# Patient Record
Sex: FEMALE | Race: WHITE | Employment: OTHER | URBAN - METROPOLITAN AREA
[De-identification: names, ages, dates, MRNs, and addresses within clinical notes are randomized per-mention and may not be internally consistent; named-entity substitution may affect disease eponyms.]

---

## 2017-01-17 ENCOUNTER — OFFICE VISIT (OUTPATIENT)
Dept: INTERNAL MEDICINE CLINIC | Age: 82
End: 2017-01-17

## 2017-01-17 VITALS
BODY MASS INDEX: 21.19 KG/M2 | TEMPERATURE: 96.5 F | OXYGEN SATURATION: 98 % | HEART RATE: 75 BPM | SYSTOLIC BLOOD PRESSURE: 132 MMHG | WEIGHT: 135 LBS | HEIGHT: 67 IN | RESPIRATION RATE: 14 BRPM | DIASTOLIC BLOOD PRESSURE: 68 MMHG

## 2017-01-17 DIAGNOSIS — Z00.00 MEDICARE ANNUAL WELLNESS VISIT, SUBSEQUENT: ICD-10-CM

## 2017-01-17 DIAGNOSIS — Z71.89 ADVANCE DIRECTIVE DISCUSSED WITH PATIENT: ICD-10-CM

## 2017-01-17 DIAGNOSIS — Z86.73 CEREBROVASCULAR DISEASE, ARTERIOSCLEROTIC, POST-STROKE: ICD-10-CM

## 2017-01-17 DIAGNOSIS — I67.2 CEREBROVASCULAR DISEASE, ARTERIOSCLEROTIC, POST-STROKE: ICD-10-CM

## 2017-01-17 DIAGNOSIS — M48.56XS COMPRESSION FRACTURE OF LUMBAR SPINE, NON-TRAUMATIC, SEQUELA: ICD-10-CM

## 2017-01-17 DIAGNOSIS — I12.9 BENIGN HYPERTENSION WITH CHRONIC KIDNEY DISEASE, STAGE III (HCC): Primary | ICD-10-CM

## 2017-01-17 DIAGNOSIS — K59.01 SLOW TRANSIT CONSTIPATION: ICD-10-CM

## 2017-01-17 DIAGNOSIS — E78.00 HYPERCHOLESTEROLEMIA: ICD-10-CM

## 2017-01-17 DIAGNOSIS — N18.30 BENIGN HYPERTENSION WITH CHRONIC KIDNEY DISEASE, STAGE III (HCC): Primary | ICD-10-CM

## 2017-01-17 DIAGNOSIS — E03.4 HYPOTHYROIDISM DUE TO ACQUIRED ATROPHY OF THYROID: ICD-10-CM

## 2017-01-17 RX ORDER — TRAMADOL HYDROCHLORIDE 50 MG/1
50 TABLET ORAL
Qty: 30 TAB | Refills: 1 | OUTPATIENT
Start: 2017-01-17 | End: 2019-06-27 | Stop reason: ALTCHOICE

## 2017-01-17 NOTE — PATIENT INSTRUCTIONS
Try Tramadol 1/2 to 1 tablet every 6 hours for severe back pain  Take Colace 100 mg 1 or 2 capsules twice a day every day  If you have gone 2-3 days without a bowel movement add Senna 1-2 tablets once or twice a day until you go. Compression Fracture of the Spine: Care Instructions  Your Care Instructions    A compression fracture happens when the front part of a spinal bone breaks and collapses. A fall or other accident can cause it. A minor injury or moving the wrong way can cause a break if you have thin or brittle bones (osteoporosis). These types of breaks will heal in 8 to 10 weeks. You will need rest and pain medicines. Your doctor may recommend physical therapy. Some doctors recommend that certain people with compression fractures wear braces. Your doctor also may treat thin or brittle bones. You may need surgery if you have lasting pain or if the bone presses on the spinal cord or nerves. You heal best when you take good care of yourself. Eat a variety of healthy foods, and don't smoke. Follow-up care is a key part of your treatment and safety. Be sure to make and go to all appointments, and call your doctor if you are having problems. It's also a good idea to know your test results and keep a list of the medicines you take. How can you care for yourself at home? · Be safe with medicines. Read and follow all instructions on the label. ¨ If the doctor gave you a prescription medicine for pain, take it as prescribed. ¨ If you are not taking a prescription pain medicine, ask your doctor if you can take an over-the-counter medicine. · Talk to your doctor about how to make your bones stronger. You may need medicines or a change in what you eat. · Be active only as directed by your doctor. When should you call for help? Call 911 anytime you think you may need emergency care. For example, call if:  · You are unable to move an arm or a leg at all.   Call your doctor now or seek immediate medical care if:  · You have new or worse symptoms in your arms, chest, legs, belly, or buttocks. Symptoms may include:  ¨ Numbness or tingling. ¨ Weakness. ¨ Pain. · You lose bladder or bowel control. · You have belly pain, bloating, vomiting, or nausea. Watch closely for changes in your health, and be sure to contact your doctor if:  · You are not getting better as expected. Where can you learn more? Go to http://travis-benita.info/. Enter P445 in the search box to learn more about \"Compression Fracture of the Spine: Care Instructions. \"  Current as of: August 10, 2016  Content Version: 11.1  © 5631-4411 [employer]. Care instructions adapted under license by [employer] (which disclaims liability or warranty for this information). If you have questions about a medical condition or this instruction, always ask your healthcare professional. [place] [address] any warranty or liability for your use of this information. Constipation: Care Instructions  Your Care Instructions  Constipation means that you have a hard time passing stools (bowel movements). People pass stools from 3 times a day to once every 3 days. What is normal for you may be different. Constipation may occur with pain in the rectum and cramping. The pain may get worse when you try to pass stools. Sometimes there are small amounts of bright red blood on toilet paper or the surface of stools. This is because of enlarged veins near the rectum (hemorrhoids). A few changes in your diet and lifestyle may help you avoid ongoing constipation. Your doctor may also prescribe medicine to help loosen your stool. Some medicines can cause constipation. These include pain medicines and antidepressants. Tell your doctor about all the medicines you take. Your doctor may want to make a medicine change to ease your symptoms. Follow-up care is a key part of your treatment and safety.  Be sure to make and go to all appointments, and call your doctor if you are having problems. It's also a good idea to know your test results and keep a list of the medicines you take. How can you care for yourself at home? · Drink plenty of fluids, enough so that your urine is light yellow or clear like water. If you have kidney, heart, or liver disease and have to limit fluids, talk with your doctor before you increase the amount of fluids you drink. · Include high-fiber foods in your diet each day. These include fruits, vegetables, beans, and whole grains. · Get at least 30 minutes of exercise on most days of the week. Walking is a good choice. You also may want to do other activities, such as running, swimming, cycling, or playing tennis or team sports. · Take a fiber supplement, such as Citrucel or Metamucil, every day. Read and follow all instructions on the label. · Schedule time each day for a bowel movement. A daily routine may help. Take your time having your bowel movement. · Support your feet with a small step stool when you sit on the toilet. This helps flex your hips and places your pelvis in a squatting position. · Your doctor may recommend an over-the-counter laxative to relieve your constipation. Examples are Milk of Magnesia and MiraLax. Read and follow all instructions on the label. Do not use laxatives on a long-term basis. When should you call for help? Call your doctor now or seek immediate medical care if:  · You have new or worse belly pain. · You have new or worse nausea or vomiting. · You have blood in your stools. Watch closely for changes in your health, and be sure to contact your doctor if:  · Your constipation is getting worse. · You do not get better as expected. Where can you learn more? Go to http://travis-benita.info/. Enter [id number] in the search box to learn more about \"Constipation: Care Instructions. \"  Current as of: May 27, 2016  Content Version: 11.1  © 7266-8267 [employer]. Care instructions adapted under license by [employer] (which disclaims liability or warranty for this information). If you have questions about a medical condition or this instruction, always ask your healthcare professional. [address] any warranty or liability for your use of this information.

## 2017-01-17 NOTE — PROGRESS NOTES
HISTORY OF PRESENT ILLNESS  Caitlyn Alcala is a 80 y.o. female. She is accompanied by a friend. HPI   CARDIOVASCULAR  She presents for follow up of hypertension, cerebrovascular disease, and chronic kidney disease. Diet and Lifestyle: generally follows a low fat low cholesterol diet, generally follows a low sodium diet, sedentary, nonsmoker  Uses cane  Medication compliance: compliant all of the time  Medication side effects: none  Home BP Monitoring: is not measured at home. Cardiovascular ROS:  She complains of dyspnea on exertion. She denies palpitations, exertional chest pressure/discomfort, claudication, lower extremity edema, dizziness    Hypothyroidism  She presents for follow up of hypothyroidism. Patient denies weight changes, heat / cold intolerance, change in energy level. Course to date has been stable. She omplains of constipation and wants advice on how she should treated. She has back pain. Pain is in the lower back worse on the right than the left. It radiates to the right leg. Tylenol does not give relief. This is a chronic problem that has been worse in the past 2 weeks. She has had lumbar spine x-rays showing multiple compression fractures.     Patient Active Problem List   Diagnosis Code    Postherpetic neuralgia B02.29    Cerebrovascular disease, arteriosclerotic, post-stroke I67.2, Z86.73    Asymptomatic PVD [facility]peripheral vascular disease) ([facility]) I73.9    CKD (chronic kidney disease) stage 3, GFR 30-59 ml/min N18.3    Hypercholesterolemia E78.00    Hypothyroidism E03.9    Palpitations R00.2    Benign hypertension with chronic kidney disease, stage III I12.9, N18.3    Advance directive discussed with patient Z70.80     Past Medical History   Diagnosis Date    Chronic kidney disease     Hypercholesteremia     Hypertension     Post herpetic neuralgia     Stroke ([facility] [address]     Thyroid disease      Past Surgical History   Procedure Laterality Date    Hx hysterectomy  1947 one ovary out    Hx appendectomy       Social History     Social History    Marital status:      Spouse name: N/A    Number of children: N/A    Years of education: N/A     Social History Main Topics    Smoking status: Never Smoker    Smokeless tobacco: Never Used    Alcohol use No    Drug use: No    Sexual activity: Not Asked     Other Topics Concern    None     Social History Narrative     Family History   Problem Relation Age of Onset    Cancer Father      Allergies   Allergen Reactions    Lyrica [Pregabalin] Other (comments)     Couldn't function    Macrobid [Nitrofurantoin Monohyd/M-Cryst] Other (comments)     sick    Neurontin [Gabapentin] Other (comments)     groggy     Current Outpatient Prescriptions   Medication Sig Dispense Refill    traMADol (ULTRAM) 50 mg tablet Take 1 Tab by mouth every six (6) hours as needed for Pain. Max Daily Amount: 200 mg. 30 Tab 0    polyethylene glycol (MIRALAX) 17 gram/dose powder Take 17 g by mouth two (2) times a day. 1 tablespoon with 8 oz of water daily 510 g 0    hydrochlorothiazide (MICROZIDE) 12.5 mg capsule TAKE ONE CAPSULE BY MOUTH ONCE DAILY 90 Cap 1    levothyroxine (SYNTHROID) 50 mcg tablet Take 2 tablet on Sundays and one the other 6 days of the week 105 Tab 3    simvastatin (ZOCOR) 20 mg tablet Take 1 Tab by mouth nightly. 90 Tab 3    nortriptyline (PAMELOR) 10 mg capsule TAKE ONE TO TWO CAPSULES BY MOUTH AT BEDTIME FOR SHINGLES PAIN 90 Cap 2    aspirin delayed-release 81 mg tablet Take  by mouth daily.  acetaminophen (TYLENOL EXTRA STRENGTH) 500 mg tablet Take  by mouth every six (6) hours as needed. Review of Systems   Constitutional: Negative for malaise/fatigue and weight loss. Gastrointestinal: Positive for constipation (Takes MOM). Negative for diarrhea. Musculoskeletal: Positive for back pain and joint pain (fingers, a-c joints, elbows).    Neurological: Negative for dizziness, tingling and focal weakness. Visit Vitals    /68 (BP 1 Location: Left arm, BP Patient Position: Sitting)    Pulse 75    Temp 96.5 °F (35.8 °C) (Oral)    Resp 14    Ht 5' 7\" (1.702 m)    Wt 135 lb (61.2 kg)    SpO2 98%    BMI 21.14 kg/m2     Physical Exam   Constitutional: She is oriented to person, place, and time. She appears well-developed and well-nourished. HENT:   Head: Normocephalic and atraumatic. Eyes: Conjunctivae are normal. Pupils are equal, round, and reactive to light. Neck: Neck supple. Carotid bruit is not present. No thyromegaly present. Cardiovascular: Normal rate, regular rhythm and normal heart sounds. PMI is not displaced. Exam reveals no gallop. No murmur heard. Pulses:       Dorsalis pedis pulses are 1+ on the right side, and 1+ on the left side. Posterior tibial pulses are 1+ on the right side, and 1+ on the left side. Pulmonary/Chest: Effort normal. She has no wheezes. She has no rhonchi. She has no rales. Abdominal: Soft. Normal appearance. She exhibits no abdominal bruit and no mass. There is no hepatosplenomegaly. There is no tenderness. Musculoskeletal: She exhibits no edema. Lumbar back: She exhibits decreased range of motion and tenderness. She exhibits no bony tenderness and no spasm. Back:    Lymphadenopathy:     She has no cervical adenopathy. Right: No supraclavicular adenopathy present. Left: No inguinal and no supraclavicular adenopathy present. Neurological: She is alert and oriented to person, place, and time. No sensory deficit. Skin: Skin is warm, dry and intact. No rash noted. Psychiatric: She has a normal mood and affect. Her behavior is normal.   Nursing note and vitals reviewed.     Lab Results   Component Value Date/Time    Sodium 141 10/28/2016 03:34 AM    Potassium 3.7 10/28/2016 03:34 AM    Chloride 103 10/28/2016 03:34 AM    CO2 31 10/28/2016 03:34 AM    Anion gap 7 10/28/2016 03:34 AM    Glucose 119 10/28/2016 03:34 AM    BUN 37 10/28/2016 03:34 AM    Creatinine 1.42 10/28/2016 03:34 AM    BUN/Creatinine ratio 26 10/28/2016 03:34 AM    GFR est AA 41 10/28/2016 03:34 AM    GFR est non-AA 34 10/28/2016 03:34 AM    Calcium 9.0 10/28/2016 03:34 AM    Bilirubin, total 0.3 10/28/2016 03:34 AM    ALT 24 10/28/2016 03:34 AM    AST 20 10/28/2016 03:34 AM    Alk. phosphatase 109 10/28/2016 03:34 AM    Protein, total 7.0 10/28/2016 03:34 AM    Albumin 3.4 10/28/2016 03:34 AM    Globulin 3.6 10/28/2016 03:34 AM    A-G Ratio 0.9 10/28/2016 03:34 AM         ASSESSMENT and PLAN    ICD-10-CM ICD-9-CM    1. Benign hypertension with chronic kidney disease, stage III G17.7 733.00 METABOLIC PANEL, BASIC    Q04.2 585.3    2. Hypothyroidism due to acquired atrophy of thyroid E03.4 244.8      246.8    3. Cerebrovascular disease, arteriosclerotic, post-stroke I67.2 437.0     Z86.73 V12.54    4. Hypercholesterolemia E78.00 272.0    5. Compression fracture of lumbar spine, non-traumatic, sequela M48.56XS 905.1 traMADol (ULTRAM) 50 mg tablet   6. Slow transit constipation K59.01 564.01    7. Medicare annual wellness visit, subsequent Z00.00 V70.0    8. Advance directive discussed with patient Z71.89 V65.49          Benign hypertension with chronic kidney disease, stage III  Hypertension is controlled  -     METABOLIC PANEL, BASIC    Hypothyroidism due to acquired atrophy of thyroid  Stable    Cerebrovascular disease, arteriosclerotic, post-stroke    Hypercholesterolemia  Hyperlipidemia is controlled Pending results of follow up lab. Compression fracture of lumbar spine, non-traumatic, sequela  -     traMADol (ULTRAM) 50 mg tablet; Take 1 Tab by mouth every six (6) hours as needed for Pain. Max Daily Amount: 200 mg. Indications: PAIN    Slow transit constipation  Take Colace 100 mg 1 or 2 capsules twice a day every day  If you have gone 2-3 days without a bowel movement add Senna 1-2 tablets once or twice a day until you go. Medicare annual wellness visit, subsequent    Advance directive discussed with patient      Follow-up Disposition:  Return in about 4 months (around 5/17/2017) for HTN, CKD, CVD .  lab results and schedule of future lab studies reviewed with patient  reviewed diet, exercise and weight control  I have discussed the diagnosis with the patient and the intended plan as seen in the above orders. Patient is in agreement. The patient has received an after-visit summary and questions were answered concerning future plans. I have discussed medication side effects and warnings with the patient as well.

## 2017-01-17 NOTE — PROGRESS NOTES
This is a Subsequent Medicare Annual Wellness Visit providing Personalized Prevention Plan Services (PPPS) (Performed 12 months after initial AWV and PPPS )    I have reviewed the patient's medical history in detail and updated the computerized patient record. History     Past Medical History   Diagnosis Date    Chronic kidney disease     Hypercholesteremia     Hypertension     Post herpetic neuralgia     Stroke ([facility] [address]     Thyroid disease       Past Surgical History   Procedure Laterality Date    Hx hysterectomy  1947     one ovary out    Hx appendectomy       Current Outpatient Prescriptions   Medication Sig Dispense Refill    traMADol (ULTRAM) 50 mg tablet Take 1 Tab by mouth every six (6) hours as needed for Pain. Max Daily Amount: 200 mg. 30 Tab 0    polyethylene glycol (MIRALAX) 17 gram/dose powder Take 17 g by mouth two (2) times a day. 1 tablespoon with 8 oz of water daily 510 g 0    hydrochlorothiazide (MICROZIDE) 12.5 mg capsule TAKE ONE CAPSULE BY MOUTH ONCE DAILY 90 Cap 1    levothyroxine (SYNTHROID) 50 mcg tablet Take 2 tablet on Sundays and one the other 6 days of the week 105 Tab 3    simvastatin (ZOCOR) 20 mg tablet Take 1 Tab by mouth nightly. 90 Tab 3    nortriptyline (PAMELOR) 10 mg capsule TAKE ONE TO TWO CAPSULES BY MOUTH AT BEDTIME FOR SHINGLES PAIN 90 Cap 2    aspirin delayed-release 81 mg tablet Take  by mouth daily.  acetaminophen (TYLENOL EXTRA STRENGTH) 500 mg tablet Take  by mouth every six (6) hours as needed.        Allergies   Allergen Reactions    Lyrica [Pregabalin] Other (comments)     Couldn't function    Macrobid [Nitrofurantoin Monohyd/M-Cryst] Other (comments)     sick    Neurontin [Gabapentin] Other (comments)     groggy     Family History   Problem Relation Age of Onset    Cancer Father      Social History   Substance Use Topics    Smoking status: Never Smoker    Smokeless tobacco: Never Used    Alcohol use No     Patient Active Problem List Diagnosis Code    Postherpetic neuralgia B02.29    Cerebrovascular disease, arteriosclerotic, post-stroke I67.2, Z86.73    Asymptomatic [facility]VD [facility]peripheral vascular disease) ([facility]) I73.9    CKD (chronic kidney disease) stage 3, GFR 30-59 ml/min N18.3    Hypercholesterolemia E78.00    Hypothyroidism E03.9    Palpitations R00.2    Benign hypertension with chronic kidney disease, stage III I12.9, N18.3    Advance directive discussed with patient Z71.89       Depression Risk Factor Screening:     PHQ 2 / 9, over the last two weeks 4/12/2016   Little interest or pleasure in doing things Not at all   Feeling down, depressed or hopeless Not at all   Total Score PHQ 2 0     Alcohol Risk Factor Screening: On any occasion during the past 3 months, have you had more than 3 drinks containing alcohol? No    Do you average more than 7 drinks per week? No      Functional Ability and Level of Safety:     Hearing Loss   moderate-to-severe    Activities of Daily Living   Self-care. Requires assistance with: no ADLs    Fall Risk     Fall Risk Assessment, last 12 mths 4/12/2016   Able to walk? Yes   Fall in past 12 months? Yes   Fall with injury?  Yes   Number of falls in past 12 months 1   Fall Risk Score 2     Abuse Screen   Patient is not abused    Review of Systems   Not required    Physical Examination     Evaluation of Cognitive Function:  Mood/affect:  happy  Appearance: age appropriate, casually dressed and within normal Limits  Family member/caregiver input: none        Patient Care Team:  Krystal Adkins MD as PCP - General (Internal Medicine)  Bruce Trinidad, RN as Nurse Navigator    Advice/Referrals/Counseling   Education and counseling provided:  End-of-Life planning (with patient's consent)    Assessment/Plan   See other note this date

## 2017-01-17 NOTE — PROGRESS NOTES
Reviewed record In preparation for visit and have obtained necessary documentation. Will bring a copy of advanced directive / living will. 1. Have you been to the ER, urgent care clinic or hospitalized since your last visit? No     2. Have you seen or consulted any other health care providers outside of the [address] [address] since your last visit? Include any pap smears or colon screening.  No    Health Maintenance reviewed:

## 2017-01-17 NOTE — MR AVS SNAPSHOT
Visit Information Date & Time Provider Department Dept. Phone Encounter #  
 1/17/2017 10:30 AM Maxine Cerna MD [facility] [phone] [id number] Follow-up Instructions Return in about 4 months (around 5/17/2017) for HTN, CKD, CVD . Routing History Upcoming Health Maintenance Date Due  
 MEDICARE YEARLY EXAM 12/2/2015 GLAUCOMA SCREENING Q2Y 8/11/2017 DTaP/Tdap/Td series (2 - Td) 11/27/2025 Allergies as of 1/17/2017  Review Complete On: 1/17/2017 By: Maxine Cerna MD  
  
 Severity Noted Reaction Type Reactions Lyrica [Pregabalin]  05/17/2010    Other (comments) Couldn't function Macrobid [Nitrofurantoin Monohyd/m-cryst]  05/17/2010    Other (comments)  
 sick Neurontin [Gabapentin]  05/17/2010    Other (comments) groggy Current Immunizations  Reviewed on 1/17/2017 Name Date Pneumococcal Conjugate (PCV-13) 4/12/2016 Pneumococcal Polysaccharide (PPSV-23) 9/4/2013 Pneumococcal Vaccine (Unspecified Type) 8/1/2005 TD Vaccine 9/1/2008 Tdap 11/27/2015  6:08 PM  
 Zoster Vaccine, Live 11/12/2013 Reviewed by Inga العلي LPN on 1/47/2689 at 49:56 AM  
You Were Diagnosed With   
  
 Codes Comments Benign hypertension with chronic kidney disease, stage III    -  Primary ICD-10-CM: I12.9, N18.3 ICD-9-CM: 403.10, 585.3 Hypothyroidism due to acquired atrophy of thyroid     ICD-10-CM: E03.4 ICD-9-CM: 244.8, 246.8 Cerebrovascular disease, arteriosclerotic, post-stroke     ICD-10-CM: I67.2, Z86.73 
ICD-9-CM: 437.0, V12.54 Hypercholesterolemia     ICD-10-CM: E78.00 ICD-9-CM: 272.0 Compression fracture of lumbar spine, non-traumatic, sequela     ICD-10-CM: M48.56XS ICD-9-CM: 905.1 Slow transit constipation     ICD-10-CM: K59.01 
ICD-9-CM: 564.01 Medicare annual wellness visit, subsequent     ICD-10-CM: Z00.00 ICD-9-CM: V70.0 Advance directive discussed with patient     ICD-10-CM: Z71.89 ICD-9-CM: V65.49 Vitals BP Pulse Temp Resp Height(growth percentile) Weight(growth percentile) 132/68 (BP 1 Location: Left arm, BP Patient Position: Sitting) 75 96.5 °F (35.8 °C) (Oral) 14 5' 7\" (1.702 m) 135 lb (61.2 kg) SpO2 BMI OB Status Smoking Status 98% 21.14 kg/m2 Hysterectomy Never Smoker Vitals History BMI and BSA Data Body Mass Index Body Surface Area  
 21.14 kg/m 2 1.7 m 2 Preferred Pharmacy Pharmacy Name Phone [facility] [address] [place], [place] - [address] [place] [address] [facility] [phone] Your Updated Medication List  
  
   
This list is accurate as of: 1/17/17 12:16 PM.  Always use your most recent med list.  
  
  
  
  
 aspirin delayed-release 81 mg tablet Take  by mouth daily. hydroCHLOROthiazide 12.5 mg capsule Commonly known as:  Clint Ny TAKE ONE CAPSULE BY MOUTH ONCE DAILY  
  
 levothyroxine 50 mcg tablet Commonly known as:  SYNTHROID Take 2 tablet on Sundays and one the other 6 days of the week  
  
 nortriptyline 10 mg capsule Commonly known as:  PAMELOR  
TAKE ONE TO TWO CAPSULES BY MOUTH AT BEDTIME FOR SHINGLES PAIN  
  
 polyethylene glycol 17 gram/dose powder Commonly known as:  Beola Rogelio Take 17 g by mouth two (2) times a day. 1 tablespoon with 8 oz of water daily  
  
 simvastatin 20 mg tablet Commonly known as:  ZOCOR Take 1 Tab by mouth nightly. traMADol 50 mg tablet Commonly known as:  ULTRAM  
Take 1 Tab by mouth every six (6) hours as needed for Pain. Max Daily Amount: 200 mg. Indications: PAIN  
  
 TYLENOL EXTRA STRENGTH 500 mg tablet Generic drug:  acetaminophen Take  by mouth every six (6) hours as needed. We Performed the Following METABOLIC PANEL, BASIC [30918 CPT(R)] Follow-up Instructions Return in about 4 months (around 5/17/2017) for HTN, CKD, CVD . Patient Instructions Try Tramadol 1/2 to 1 tablet every 6 hours for severe back pain Take Colace 100 mg 1 or 2 capsules twice a day every day If you have gone 2-3 days without a bowel movement add Senna 1-2 tablets once or twice a day until you go. Compression Fracture of the Spine: Care Instructions Your Care Instructions A compression fracture happens when the front part of a spinal bone breaks and collapses. A fall or other accident can cause it. A minor injury or moving the wrong way can cause a break if you have thin or brittle bones (osteoporosis). These types of breaks will heal in 8 to 10 weeks. You will need rest and pain medicines. Your doctor may recommend physical therapy. Some doctors recommend that certain people with compression fractures wear braces. Your doctor also may treat thin or brittle bones. You may need surgery if you have lasting pain or if the bone presses on the spinal cord or nerves. You heal best when you take good care of yourself. Eat a variety of healthy foods, and don't smoke. Follow-up care is a key part of your treatment and safety. Be sure to make and go to all appointments, and call your doctor if you are having problems. It's also a good idea to know your test results and keep a list of the medicines you take. How can you care for yourself at home? · Be safe with medicines. Read and follow all instructions on the label. ¨ If the doctor gave you a prescription medicine for pain, take it as prescribed. ¨ If you are not taking a prescription pain medicine, ask your doctor if you can take an over-the-counter medicine. · Talk to your doctor about how to make your bones stronger. You may need medicines or a change in what you eat. · Be active only as directed by your doctor. When should you call for help? Call 911 anytime you think you may need emergency care. For example, call if: 
· You are unable to move an arm or a leg at all. Call your doctor now or seek immediate medical care if: 
· You have new or worse symptoms in your arms, chest, legs, belly, or buttocks. Symptoms may include: ¨ Numbness or tingling. ¨ Weakness. ¨ Pain. · You lose bladder or bowel control. · You have belly pain, bloating, vomiting, or nausea. Watch closely for changes in your health, and be sure to contact your doctor if: 
· You are not getting better as expected. Where can you learn more? Go to http://travis-benita.info/. Enter P445 in the search box to learn more about \"Compression Fracture of the Spine: Care Instructions. \" Current as of: August 10, 2016 Content Version: 11.1 © 3709-5826 [employer]. Care instructions adapted under license by [employer] (which disclaims liability or warranty for this information). If you have questions about a medical condition or this instruction, always ask your healthcare professional. [place] [address] any warranty or liability for your use of this information. Constipation: Care Instructions Your Care Instructions Constipation means that you have a hard time passing stools (bowel movements). People pass stools from 3 times a day to once every 3 days. What is normal for you may be different. Constipation may occur with pain in the rectum and cramping. The pain may get worse when you try to pass stools. Sometimes there are small amounts of bright red blood on toilet paper or the surface of stools. This is because of enlarged veins near the rectum (hemorrhoids). A few changes in your diet and lifestyle may help you avoid ongoing constipation. Your doctor may also prescribe medicine to help loosen your stool. Some medicines can cause constipation. These include pain medicines and antidepressants. Tell your doctor about all the medicines you take. Your doctor may want to make a medicine change to ease your symptoms. Follow-up care is a key part of your treatment and safety. Be sure to make and go to all appointments, and call your doctor if you are having problems. It's also a good idea to know your test results and keep a list of the medicines you take. How can you care for yourself at home? · Drink plenty of fluids, enough so that your urine is light yellow or clear like water. If you have kidney, heart, or liver disease and have to limit fluids, talk with your doctor before you increase the amount of fluids you drink. · Include high-fiber foods in your diet each day. These include fruits, vegetables, beans, and whole grains. · Get at least 30 minutes of exercise on most days of the week. Walking is a good choice. You also may want to do other activities, such as running, swimming, cycling, or playing tennis or team sports. · Take a fiber supplement, such as Citrucel or Metamucil, every day. Read and follow all instructions on the label. · Schedule time each day for a bowel movement. A daily routine may help. Take your time having your bowel movement. · Support your feet with a small step stool when you sit on the toilet. This helps flex your hips and places your pelvis in a squatting position. · Your doctor may recommend an over-the-counter laxative to relieve your constipation. Examples are Milk of Magnesia and MiraLax. Read and follow all instructions on the label. Do not use laxatives on a long-term basis. When should you call for help? Call your doctor now or seek immediate medical care if: 
· You have new or worse belly pain. · You have new or worse nausea or vomiting. · You have blood in your stools. Watch closely for changes in your health, and be sure to contact your doctor if: 
· Your constipation is getting worse. · You do not get better as expected. Where can you learn more? Go to http://travis-benita.info/.  
Enter [phone] in the search box to learn more about \"Constipation: Care Instructions. \" Current as of: May 27, 2016 Content Version: 11.1 © 0694-8345 [employer] Care instructions adapted under license by [employer] (which disclaims liability or warranty for this information). If you have questions about a medical condition or this instruction, always ask your healthcare professional. [address] any warranty or liability for your use of this information. Introducing [facility] & HEALTH SERVICES! Catarina Goodwin introduces Goby patient portal. Now you can access parts of your medical record, email your doctor's office, and request medication refills online. 1. In your internet browser, go to [email or web address] 2. Click on the First Time User? Click Here link in the Sign In box. You will see the New Member Sign Up page. 3. Enter your Goby Access Code exactly as it appears below. You will not need to use this code after youve completed the sign-up process. If you do not sign up before the expiration date, you must request a new code. · Goby Access Code: [email or web address] Expires: 3/15/2017 11:46 AM 
 
4. Enter the last four digits of your Social Security Number (xxxx) and Date of Birth (mm/dd/yyyy) as indicated and click Submit. You will be taken to the next sign-up page. 5. Create a Goby ID. This will be your Goby login ID and cannot be changed, so think of one that is secure and easy to remember. 6. Create a Goby password. You can change your password at any time. 7. Enter your Password Reset Question and Answer. This can be used at a later time if you forget your password. 8. Enter your e-mail address. You will receive e-mail notification when new information is available in [address] 9. Click Sign Up. You can now view and download portions of your medical record. 10. Click the Download Summary menu link to download a portable copy of your medical information. If you have questions, please visit the Frequently Asked Questions section of the [facility] website. Remember, [employer] is NOT to be used for urgent needs. For medical emergencies, dial 911. Now available from your iPhone and Android! Please provide this summary of care documentation to your next provider. Your primary care clinician is listed as Pierre Garcia. If you have any questions after today's visit, please call [phone].

## 2017-01-19 NOTE — ACP (ADVANCE CARE PLANNING)
With patient's permission advance care planning discussed. Primary SDM would be granddaughter Lyndon Carreon. Secondary SDM would be grandson Salvatore Mackenzie. She wants life prolonging treatment if death is imminent, but not if there is overwhelming, permanent neurologic injury. Reviewed paperwork. Given name of NN who can be contacted with any questions or help needed completing forms. Conversation took 4 minutes.

## 2017-05-08 DIAGNOSIS — R79.89 ELEVATED TSH: Primary | ICD-10-CM

## 2017-05-18 ENCOUNTER — APPOINTMENT (OUTPATIENT)
Dept: INTERNAL MEDICINE CLINIC | Age: 82
End: 2017-05-18

## 2017-05-18 ENCOUNTER — HOSPITAL ENCOUNTER (OUTPATIENT)
Dept: LAB | Age: 82
Discharge: HOME | End: 2017-05-18
Payer: MEDICARE

## 2017-05-18 DIAGNOSIS — R79.89 ELEVATED TSH: ICD-10-CM

## 2017-05-18 PROCEDURE — 80048 BASIC METABOLIC PNL TOTAL CA: CPT

## 2017-05-18 PROCEDURE — 36415 COLL VENOUS BLD VENIPUNCTURE: CPT

## 2017-05-18 PROCEDURE — 84443 ASSAY THYROID STIM HORMONE: CPT

## 2017-05-19 LAB
BUN SERPL-MCNC: 38 MG/DL (ref 10–36)
BUN/CREAT SERPL: 24 (ref 12–28)
CALCIUM SERPL-MCNC: 9.5 MG/DL (ref 8.7–10.3)
CHLORIDE SERPL-SCNC: 101 MMOL/L (ref 96–106)
CO2 SERPL-SCNC: 27 MMOL/L (ref 18–29)
CREAT SERPL-MCNC: 1.56 MG/DL (ref 0.57–1)
GLUCOSE SERPL-MCNC: 83 MG/DL (ref 65–99)
INTERPRETATION: NORMAL
POTASSIUM SERPL-SCNC: 4.1 MMOL/L (ref 3.5–5.2)
SODIUM SERPL-SCNC: 142 MMOL/L (ref 134–144)
TSH SERPL DL<=0.005 MIU/L-ACNC: 3.61 UIU/ML (ref 0.45–4.5)

## 2017-05-25 ENCOUNTER — OFFICE VISIT (OUTPATIENT)
Dept: INTERNAL MEDICINE CLINIC | Age: 82
End: 2017-05-25

## 2017-05-25 VITALS
DIASTOLIC BLOOD PRESSURE: 74 MMHG | WEIGHT: 136 LBS | BODY MASS INDEX: 21.35 KG/M2 | RESPIRATION RATE: 14 BRPM | HEIGHT: 67 IN | TEMPERATURE: 96.1 F | SYSTOLIC BLOOD PRESSURE: 152 MMHG | OXYGEN SATURATION: 99 % | HEART RATE: 87 BPM

## 2017-05-25 DIAGNOSIS — Z13.31 SCREENING FOR DEPRESSION: ICD-10-CM

## 2017-05-25 DIAGNOSIS — E78.00 HYPERCHOLESTEROLEMIA: ICD-10-CM

## 2017-05-25 DIAGNOSIS — E03.4 HYPOTHYROIDISM DUE TO ACQUIRED ATROPHY OF THYROID: ICD-10-CM

## 2017-05-25 DIAGNOSIS — I73.9 ASYMPTOMATIC PVD (PERIPHERAL VASCULAR DISEASE) (HCC): ICD-10-CM

## 2017-05-25 DIAGNOSIS — Z86.73 CEREBROVASCULAR DISEASE, ARTERIOSCLEROTIC, POST-STROKE: ICD-10-CM

## 2017-05-25 DIAGNOSIS — N18.30 BENIGN HYPERTENSION WITH CHRONIC KIDNEY DISEASE, STAGE III (HCC): Primary | ICD-10-CM

## 2017-05-25 DIAGNOSIS — I12.9 BENIGN HYPERTENSION WITH CHRONIC KIDNEY DISEASE, STAGE III (HCC): Primary | ICD-10-CM

## 2017-05-25 DIAGNOSIS — I67.2 CEREBROVASCULAR DISEASE, ARTERIOSCLEROTIC, POST-STROKE: ICD-10-CM

## 2017-05-25 RX ORDER — ASPIRIN 81 MG/1
81 TABLET ORAL DAILY
Qty: 90 TAB | Refills: 3
Start: 2017-05-25 | End: 2020-07-04 | Stop reason: SDUPTHER

## 2017-05-25 NOTE — MR AVS SNAPSHOT
Visit Information Date & Time Provider Department Dept. Phone Encounter #  
 5/25/2017  1:30 PM Chantal Suarez MD [facility] [phone] [id number] Follow-up Instructions Return in about 4 months (around 9/25/2017) for Thyroid, htn, CKD, chol   Fasting lab one week prior . Upcoming Health Maintenance Date Due INFLUENZA AGE 9 TO ADULT 8/1/2017 GLAUCOMA SCREENING Q2Y 8/11/2017 MEDICARE YEARLY EXAM 1/18/2018 DTaP/Tdap/Td series (2 - Td) 11/27/2025 Allergies as of 5/25/2017  Review Complete On: 5/25/2017 By: Chantal Suarez MD  
  
 Severity Noted Reaction Type Reactions Lyrica [Pregabalin]  05/17/2010    Other (comments) Couldn't function Macrobid [Nitrofurantoin Monohyd/m-cryst]  05/17/2010    Other (comments)  
 sick Neurontin [Gabapentin]  05/17/2010    Other (comments) groggy Current Immunizations  Reviewed on 5/25/2017 Name Date Pneumococcal Conjugate (PCV-13) 4/12/2016 Pneumococcal Polysaccharide (PPSV-23) 9/4/2013 Pneumococcal Vaccine (Unspecified Type) 8/1/2005 TD Vaccine 9/1/2008 Tdap 11/27/2015  6:08 PM  
 Zoster Vaccine, Live 11/12/2013 Reviewed by Divina Peña LPN on 2/46/3784 at  1:29 PM  
You Were Diagnosed With   
  
 Codes Comments Benign hypertension with chronic kidney disease, stage III    -  Primary ICD-10-CM: I12.9, N18.3 ICD-9-CM: 403.10, 585.3 Hypothyroidism due to acquired atrophy of thyroid     ICD-10-CM: E03.4 ICD-9-CM: 244.8, 246.8 Hypercholesterolemia     ICD-10-CM: E78.00 ICD-9-CM: 272.0 Cerebrovascular disease, arteriosclerotic, post-stroke     ICD-10-CM: I67.2, Z86.73 
ICD-9-CM: 437.0, V12.54 Asymptomatic PVD (peripheral vascular disease) ([facility]     ICD-10-CM: I73.9 ICD-9-CM: 443. 9 Vitals BP Pulse Temp Resp Height(growth percentile) Weight(growth percentile)  152/74 (BP 1 Location: Left arm, BP Patient Position: Sitting) 87 96.1 °F (35.6 °C) (Oral) 14 5' 7\" (1.702 m) 136 lb (61.7 kg) SpO2 BMI OB Status Smoking Status 99% 21.3 kg/m2 Hysterectomy Never Smoker Vitals History BMI and BSA Data Body Mass Index Body Surface Area  
 21.3 kg/m 2 1.71 m 2 Preferred Pharmacy Pharmacy Name Phone [facility] [address] [place], [place] - [address] [place] [address] [facility] [phone] Your Updated Medication List  
  
   
This list is accurate as of: 5/25/17  2:26 PM.  Always use your most recent med list.  
  
  
  
  
 aspirin delayed-release 81 mg tablet Take 1 Tab by mouth daily. hydroCHLOROthiazide 12.5 mg capsule Commonly known as:  Sydni Coaster TAKE ONE CAPSULE BY MOUTH ONCE DAILY  
  
 levothyroxine 50 mcg tablet Commonly known as:  SYNTHROID Take 2 tablet on Sundays and one the other 6 days of the week  
  
 nortriptyline 10 mg capsule Commonly known as:  PAMELOR  
TAKE ONE TO TWO CAPSULES BY MOUTH AT BEDTIME FOR SHINGLES PAIN  
  
 polyethylene glycol 17 gram/dose powder Commonly known as:  Chaneta [place] Take 17 g by mouth two (2) times a day. 1 tablespoon with 8 oz of water daily  
  
 simvastatin 20 mg tablet Commonly known as:  ZOCOR Take 1 Tab by mouth nightly. traMADol 50 mg tablet Commonly known as:  ULTRAM  
Take 1 Tab by mouth every six (6) hours as needed for Pain. Max Daily Amount: 200 mg. Indications: PAIN  
  
 TYLENOL EXTRA STRENGTH 500 mg tablet Generic drug:  acetaminophen Take  by mouth every six (6) hours as needed. Follow-up Instructions Return in about 4 months (around 9/25/2017) for Thyroid, htn, CKD, chol   Fasting lab one week prior . To-Do List   
 09/25/2017 Lab:  LIPID PANEL   
  
 09/25/2017 Lab:  METABOLIC PANEL, BASIC Patient Instructions May use Ocean Spray or other saline nose spray. Do not put other things in your nose. Office visit in 4 months with fasting lab work a week before visit. Kidney Disease and High Blood Pressure: Care Instructions Your Care Instructions Long-term (chronic) kidney disease happens when the kidneys cannot remove waste and keep your body's fluids and chemicals in balance. Usually, the kidneys remove waste from the blood through the urine. When the kidneys are not working well, waste can build up so much that it poisons the body. Kidney disease can make you very tired. It also can cause swelling, or edema, in your legs or other areas of your body. High blood pressure is one of the major causes of chronic kidney disease. And kidney disease can also cause high blood pressure. No matter which came first, having high blood pressure damages the tiny blood vessels in the kidneys. If you have high blood pressure, it is important to lower it. There are many things you can do to lower your blood pressure, which may help slow or stop the damage to your kidneys. Follow-up care is a key part of your treatment and safety. Be sure to make and go to all appointments, and call your doctor if you are having problems. It's also a good idea to know your test results and keep a list of the medicines you take. How can you care for yourself at home? · Be safe with medicines. Take your medicines exactly as prescribed. Call your doctor if you have any problems with your medicine. You will probably need more than one medicine to lower your blood pressure. You will get more details on the specific medicines your doctor prescribes. · Work with your doctor and a dietitian to plan meals that have the right amount of nutrients for you. You will probably have to limit salt, fluids, and protein. · Stay at a healthy weight. This is very important if you put on weight around the waist. Losing even 10 pounds can help you lower your blood pressure. · Manage other health problems such as diabetes and high cholesterol.  You can help lower your risk for heart disease and blood vessel problems with a healthy lifestyle along with medicines. · Do not take ibuprofen (Advil, Motrin) or naproxen (Aleve), or similar medicines, unless your doctor tells you to. They may make chronic kidney disease worse. It is okay to take acetaminophen (Tylenol). · If your doctor recommends it, get more exercise. Walking is a good choice. Bit by bit, increase the amount you walk every day. Try for at least 30 minutes on most days of the week. You also may want to swim, bike, or do other activities. · Limit or avoid alcohol. Talk to your doctor about whether you can drink any alcohol. · Do not smoke or allow others to smoke around you. If you need help quitting, talk to your doctor about stop-smoking programs and medicines. These can increase your chances of quitting for good. When should you call for help? Call 911 anytime you think you may need emergency care. For example, call if: 
· You passed out (lost consciousness). · You have symptoms of a heart attack, such as: ¨ Chest pain or pressure. ¨ Sweating. ¨ Shortness of breath. ¨ Nausea or vomiting. ¨ Pain that spreads from the chest to the neck, jaw, or one or both shoulders or arms. ¨ Dizziness or lightheadedness. ¨ A fast or uneven pulse. After calling 911, chew 1 adult-strength aspirin. Wait for an ambulance. Do not try to drive yourself. Call your doctor now or seek immediate medical care if: 
· You are confused or are more tired or weak than usual. 
· You bleed or have bruises. Watch closely for changes in your health, and be sure to contact your doctor if: 
· You do not want to eat. · You have new swelling of your arms or feet, or swelling that you already have gets worse. · You do not get better as expected. Where can you learn more? Go to http://travis-benita.info/. Enter N861 in the search box to learn more about \"Kidney Disease and High Blood Pressure: Care Instructions. \" Current as of: November 20, 2015 Content Version: 11.2 © 6258-4185 [employer]. Care instructions adapted under license by [employer] (which disclaims liability or warranty for this information). If you have questions about a medical condition or this instruction, always ask your healthcare professional. [address] any warranty or liability for your use of this information. Introducing [facility] & HEALTH SERVICES! [employer] introduces [employer] patient portal. Now you can access parts of your medical record, email your doctor's office, and request medication refills online. 1. In your internet browser, go to [email or web address] 2. Click on the First Time User? Click Here link in the Sign In box. You will see the New Member Sign Up page. 3. Enter your [employer] Access Code exactly as it appears below. You will not need to use this code after youve completed the sign-up process. If you do not sign up before the expiration date, you must request a new code. · [employer] Access Code: [email or web address] Expires: 8/23/2017  2:26 PM 
 
4. Enter the last four digits of your Social Security Number (xxxx) and Date of Birth (mm/dd/yyyy) as indicated and click Submit. You will be taken to the next sign-up page. 5. Create a [employer] ID. This will be your [employer] login ID and cannot be changed, so think of one that is secure and easy to remember. 6. Create a [employer] password. You can change your password at any time. 7. Enter your Password Reset Question and Answer. This can be used at a later time if you forget your password. 8. Enter your e-mail address. You will receive e-mail notification when new information is available in [address] 9. Click Sign Up. You can now view and download portions of your medical record. 10. Click the Download Summary menu link to download a portable copy of your medical information.  
 
If you have questions, please visit the Frequently Asked Questions section of the [place]. Remember, enercastt is NOT to be used for urgent needs. For medical emergencies, dial 911. Now available from your iPhone and Android! Please provide this summary of care documentation to your next provider. Your primary care clinician is listed as Monica Campos. If you have any questions after today's visit, please call [phone].

## 2017-05-25 NOTE — PROGRESS NOTES
HISTORY OF PRESENT ILLNESS  Cindy Sparks is a [facility] y.o. female. She is accompanied by her daughter  HPI   She presents for follow up of hypertension with chronic kidney disease, hyperlipidemia, peripheral vascular disease and cerebrovascular disease with history of prior stroke. Diet and Lifestyle: generally follows a low fat low cholesterol diet, not attempting to follow a low sodium diet, sedentary, nonsmoker  Medication compliance: compliant all of the time  Medication side effects: none  Home BP Monitoring:  is not measured at home  Cardiovascular ROS:  She complains of dyspnea on exertion, dizziness.  (upon rising   She denies palpitations, orthopnea, exertional chest pressure/discomfort, claudication, lower extremity edema   Hard bump under left nostril    Patient Active Problem List   Diagnosis Code    Postherpetic neuralgia B02.29    Cerebrovascular disease, arteriosclerotic, post-stroke I67.2, Z86.73    Asymptomatic [facility]peripheral vascular disease) ([facility]) I73.9    CKD (chronic kidney disease) stage 3, GFR 30-59 ml/min N18.3    Hypercholesterolemia E78.00    Palpitations R00.2    Benign hypertension with chronic kidney disease, stage III I12.9, N18.3    Advance directive discussed with patient Z70.80    Hypothyroidism due to acquired atrophy of thyroid E03.4     Past Medical History:   Diagnosis Date    Chronic kidney disease     Hypercholesteremia     Hypertension     Post herpetic neuralgia     Stroke ([facility] [address]     Thyroid disease      Past Surgical History:   Procedure Laterality Date    HX APPENDECTOMY      HX HYSTERECTOMY  1947    one ovary out     Social History     Social History    Marital status:      Spouse name: N/A    Number of children: N/A    Years of education: N/A     Social History Main Topics    Smoking status: Never Smoker    Smokeless tobacco: Never Used    Alcohol use No    Drug use: No    Sexual activity: Not Asked     Other Topics Concern    None     Social History Narrative     Family History   Problem Relation Age of Onset    Cancer Father      Allergies   Allergen Reactions    Lyrica [Pregabalin] Other (comments)     Couldn't function    Macrobid [Nitrofurantoin Monohyd/M-Cryst] Other (comments)     sick    Neurontin [Gabapentin] Other (comments)     groggy     Current Outpatient Prescriptions   Medication Sig Dispense Refill    nortriptyline (PAMELOR) 10 mg capsule TAKE ONE TO TWO CAPSULES BY MOUTH AT BEDTIME FOR SHINGLES PAIN 60 Cap 5    hydroCHLOROthiazide (MICROZIDE) 12.5 mg capsule TAKE ONE CAPSULE BY MOUTH ONCE DAILY 90 Cap 1    traMADol (ULTRAM) 50 mg tablet Take 1 Tab by mouth every six (6) hours as needed for Pain. Max Daily Amount: 200 mg. Indications: PAIN 30 Tab 1    polyethylene glycol (MIRALAX) 17 gram/dose powder Take 17 g by mouth two (2) times a day. 1 tablespoon with 8 oz of water daily 510 g 0    levothyroxine (SYNTHROID) 50 mcg tablet Take 2 tablet on Sundays and one the other 6 days of the week 105 Tab 3    simvastatin (ZOCOR) 20 mg tablet Take 1 Tab by mouth nightly. 90 Tab 3    aspirin delayed-release 81 mg tablet Take  by mouth daily.  acetaminophen (TYLENOL EXTRA STRENGTH) 500 mg tablet Take  by mouth every six (6) hours as needed. Review of Systems   Constitutional: Negative for malaise/fatigue and weight loss. Gastrointestinal: Negative for constipation, diarrhea and heartburn. Musculoskeletal: Positive for back pain, joint pain (shoulders) and neck pain. Neurological: Negative for dizziness, tingling and focal weakness. Visit Vitals    /74 (BP 1 Location: Left arm, BP Patient Position: Sitting)    Pulse 87    Temp 96.1 °F (35.6 °C) (Oral)    Resp 14    Ht 5' 7\" (1.702 m)    Wt 136 lb (61.7 kg)    SpO2 99%    BMI 21.3 kg/m2     Physical Exam   Constitutional: She is oriented to person, place, and time. She appears well-developed and well-nourished.    HENT:   Head: Normocephalic and atraumatic. Eyes: Conjunctivae are normal. Pupils are equal, round, and reactive to light. Neck: Neck supple. Carotid bruit is not present. No thyromegaly present. Cardiovascular: Normal rate, regular rhythm and normal heart sounds. PMI is not displaced. Exam reveals no gallop. No murmur heard. Pulses:       Dorsalis pedis pulses are 2+ on the right side, and 2+ on the left side. Posterior tibial pulses are 2+ on the right side, and 2+ on the left side. Pulmonary/Chest: Effort normal. She has no wheezes. She has no rhonchi. She has no rales. Abdominal: Soft. Normal appearance. She exhibits no abdominal bruit and no mass. There is no hepatosplenomegaly. There is no tenderness. Musculoskeletal: She exhibits no edema. Lymphadenopathy:     She has no cervical adenopathy. Right: No supraclavicular adenopathy present. Left: No supraclavicular adenopathy present. Neurological: She is alert and oriented to person, place, and time. No sensory deficit. Skin: Skin is warm, dry and intact. No rash noted. Psychiatric: She has a normal mood and affect. Her behavior is normal.   Nursing note and vitals reviewed.     Results for orders placed or performed in visit on 04/77/79   METABOLIC PANEL, BASIC   Result Value Ref Range    Glucose 83 65 - 99 mg/dL    BUN 38 (H) 10 - 36 mg/dL    Creatinine 1.56 (H) 0.57 - 1.00 mg/dL    GFR est non-AA 26 (L) >59 mL/min/1.73    GFR est AA 30 (L) >59 mL/min/1.73    BUN/Creatinine ratio 24 12 - 28    Sodium 142 134 - 144 mmol/L    Potassium 4.1 3.5 - 5.2 mmol/L    Chloride 101 96 - 106 mmol/L    CO2 27 18 - 29 mmol/L    Calcium 9.5 8.7 - 10.3 mg/dL   CKD REPORT   Result Value Ref Range    Interpretation Note    TSH REFLEX TO T4   Result Value Ref Range    TSH 3.610 0.450 - 4.500 uIU/mL     Lab Results   Component Value Date/Time    Cholesterol, total 164 04/05/2016 10:34 AM    HDL Cholesterol 111 04/05/2016 10:34 AM    LDL,Direct 55 06/18/2015 03:43 PM LDL, calculated 44 04/05/2016 10:34 AM    VLDL, calculated 9 04/05/2016 10:34 AM    Triglyceride 44 04/05/2016 10:34 AM    CHOL/HDL Ratio 2.1 11/10/2010 09:50 AM       ASSESSMENT and PLAN    ICD-10-CM ICD-9-CM    1. Benign hypertension with chronic kidney disease, stage III H77.8 642.36 METABOLIC PANEL, BASIC    V98.5 585.3    2. Hypothyroidism due to acquired atrophy of thyroid E03.4 244.8      246.8    3. Hypercholesterolemia E78.00 272.0 LIPID PANEL   4. Cerebrovascular disease, arteriosclerotic, post-stroke I67.2 437.0     Z86.73 V12.54    5. Asymptomatic PVD (peripheral vascular disease) ([facility]) I73.9 443.9 aspirin delayed-release 81 mg tablet         Benign hypertension with chronic kidney disease, stage III  Hypertension is controlled Creat is a little worse. -     METABOLIC PANEL, BASIC; Future    Hypothyroidism due to acquired atrophy of thyroid  Stable. Euthyroid on replacement. Hypercholesterolemia  Hyperlipidemia is controlled  -     LIPID PANEL; Future    Cerebrovascular disease, arteriosclerotic, post-stroke  Stable    Asymptomatic PVD (peripheral vascular disease) ([facility])  -     aspirin delayed-release 81 mg tablet; Take 1 Tab by mouth daily. Follow-up Disposition:  Return in about 4 months (around 9/25/2017) for Thyroid, htn, CKD, chol   Fasting lab one week prior . lab results and schedule of future lab studies reviewed with patient  reviewed diet, exercise and weight control  cardiovascular risk and specific lipid/LDL goals reviewed  I have discussed the diagnosis with the patient and the intended plan as seen in the above orders. Patient is in agreement. The patient has received an after-visit summary and questions were answered concerning future plans. I have discussed medication side effects and warnings with the patient as well.

## 2017-05-25 NOTE — PROGRESS NOTES
Reviewed record In preparation for visit and have obtained necessary documentation. Will bring a copy of advanced directive / living will. 1. Have you been to the ER, urgent care clinic or hospitalized since your last visit? No     2. Have you seen or consulted any other health care providers outside of the [address] [place] [address] since your last visit? Include any pap smears or colon screening.  No    Health Maintenance reviewed:

## 2017-08-04 ENCOUNTER — OFFICE VISIT (OUTPATIENT)
Dept: INTERNAL MEDICINE CLINIC | Age: 82
End: 2017-08-04

## 2017-08-04 VITALS
BODY MASS INDEX: 20.88 KG/M2 | HEIGHT: 67 IN | DIASTOLIC BLOOD PRESSURE: 66 MMHG | HEART RATE: 87 BPM | OXYGEN SATURATION: 99 % | WEIGHT: 133 LBS | TEMPERATURE: 96.5 F | RESPIRATION RATE: 14 BRPM | SYSTOLIC BLOOD PRESSURE: 132 MMHG

## 2017-08-04 DIAGNOSIS — M62.838 CERVICAL PARASPINAL MUSCLE SPASM: Primary | ICD-10-CM

## 2017-08-04 NOTE — PROGRESS NOTES
HISTORY OF PRESENT ILLNESS  John Kuo is a 80 y.o. female. She is accompanied by her daughter. HPI She presents with a 3 day history of moderate intensity, constant, burning pain in right upper scalp, occipital scalp, posterior neck, external ear, and jaw. Throat and tongue feel swollen, but she has no difficulty speaking or swallowing. This is a larger area than was involved when she had shingles, but it is the same type of pain. It is relieved with Tylenol. Nothing in particular makes it worse. Denies numbness or tingling or weakness in arms. No rash.      Patient Active Problem List   Diagnosis Code    Postherpetic neuralgia B02.29    Cerebrovascular disease, arteriosclerotic, post-stroke I67.2, Z86.73    Asymptomatic PVD (peripheral vascular disease) ([facility]) I73.9    CKD (chronic kidney disease) stage 3, GFR 30-59 ml/min N18.3    Hypercholesterolemia E78.00    Palpitations R00.2    Benign hypertension with chronic kidney disease, stage III I12.9, N18.3    Advance directive discussed with patient Z70.80    Hypothyroidism due to acquired atrophy of thyroid E03.4     Past Medical History:   Diagnosis Date    Chronic kidney disease     Hypercholesteremia     Hypertension     Post herpetic neuralgia     Stroke ([facility] [address]     Thyroid disease      Past Surgical History:   Procedure Laterality Date    HX APPENDECTOMY      HX HYSTERECTOMY  1947    one ovary out     Social History     Social History    Marital status:      Spouse name: N/A    Number of children: N/A    Years of education: N/A     Social History Main Topics    Smoking status: Never Smoker    Smokeless tobacco: Never Used    Alcohol use No    Drug use: No    Sexual activity: Not Asked     Other Topics Concern    None     Social History Narrative     Family History   Problem Relation Age of Onset    Cancer Father      Allergies   Allergen Reactions    Lyrica [Pregabalin] Other (comments)     Couldn't function    Macrobid [Nitrofurantoin Monohyd/M-Cryst] Other (comments)     sick    Neurontin [Gabapentin] Other (comments)     groggy     Current Outpatient Prescriptions   Medication Sig Dispense Refill    aspirin delayed-release 81 mg tablet Take 1 Tab by mouth daily. 90 Tab 3    nortriptyline (PAMELOR) 10 mg capsule TAKE ONE TO TWO CAPSULES BY MOUTH AT BEDTIME FOR SHINGLES PAIN 60 Cap 5    hydroCHLOROthiazide (MICROZIDE) 12.5 mg capsule TAKE ONE CAPSULE BY MOUTH ONCE DAILY 90 Cap 1    traMADol (ULTRAM) 50 mg tablet Take 1 Tab by mouth every six (6) hours as needed for Pain. Max Daily Amount: 200 mg. Indications: PAIN 30 Tab 1    polyethylene glycol (MIRALAX) 17 gram/dose powder Take 17 g by mouth two (2) times a day. 1 tablespoon with 8 oz of water daily 510 g 0    levothyroxine (SYNTHROID) 50 mcg tablet Take 2 tablet on Sundays and one the other 6 days of the week 105 Tab 3    simvastatin (ZOCOR) 20 mg tablet Take 1 Tab by mouth nightly. 90 Tab 3    acetaminophen (TYLENOL EXTRA STRENGTH) 500 mg tablet Take  by mouth every six (6) hours as needed. ROS       Visit Vitals    /66 (BP 1 Location: Left arm, BP Patient Position: Sitting)    Pulse 87    Temp 96.5 °F (35.8 °C) (Oral)    Resp 14    Ht 5' 7\" (1.702 m)    Wt 133 lb (60.3 kg)    SpO2 99%    BMI 20.83 kg/m2     Physical Exam   Constitutional: She is oriented to person, place, and time. She appears well-developed and well-nourished. HENT:   Head: Normocephalic and atraumatic. Mouth/Throat: Oropharynx is clear and moist and mucous membranes are normal.   Eyes: Pupils are equal, round, and reactive to light. Neck: Neck supple. Cardiovascular: Normal rate, regular rhythm and normal heart sounds. Exam reveals no gallop. No murmur heard. Pulmonary/Chest: Effort normal and breath sounds normal. She has no wheezes. She has no rales. Musculoskeletal: She exhibits no edema.         Cervical back: She exhibits decreased range of motion, tenderness, pain and spasm. She exhibits no bony tenderness and no swelling. Back:    Neurological: She is alert and oriented to person, place, and time. Skin: Skin is warm and dry. No erythema. Nursing note and vitals reviewed. ASSESSMENT and PLAN    ICD-10-CM ICD-9-CM    1. Cervical paraspinal muscle spasm M62.838 728.85      Diagnoses and all orders for this visit:    1. Cervical paraspinal muscle spasm  Continue Tylenol. Use heat 3-4 times a day. Reassured this is not a recurrence of shingles. Follow-up Disposition:  Return if symptoms worsen or fail to improve. reviewed diet, exercise and weight control  I have discussed the diagnosis with the patient and the intended plan as seen in the above orders. Patient is in agreement. The patient has received an after-visit summary and questions were answered concerning future plans. I have discussed medication side effects and warnings with the patient as well.

## 2017-08-04 NOTE — PROGRESS NOTES
Reviewed record In preparation for visit and have obtained necessary documentation. Will bring a copy of advanced directive / living will. 1. Have you been to the ER, urgent care clinic or hospitalized since your last visit? No     2. Have you seen or consulted any other health care providers outside of the [address] [place] [address] since your last visit? Include any pap smears or colon screening. No    Vitals reviewed with provider.     Health Maintenance reviewed:

## 2017-08-04 NOTE — PATIENT INSTRUCTIONS
May continue Tylenol as needed for pain. Do not more than 2 tablets 4 times a day. Neck Spasm: Care Instructions  Your Care Instructions  A neck spasm is sudden tightness and pain in your neck muscles. A spasm may be caused by some activities or repeated movements. For example, you may be more likely to have a neck spasm if you slouch, paint a ceiling, work at a computer, or sleep with your neck twisted. But the cause isn't always clear. Home treatment includes using heat or ice, taking over-the-counter (OTC) pain medicines, and avoiding activities that may lead to neck pain. Gentle stretching, or treatments such as massage or manipulation, may also help ease a neck spasm. For a neck spasm that doesn't get better with home care, your doctor may prescribe medicine. He or she may also suggest exercise or physical therapy to help strengthen or relax your neck muscles. Follow-up care is a key part of your treatment and safety. Be sure to make and go to all appointments, and call your doctor if you are having problems. It's also a good idea to know your test results and keep a list of the medicines you take. How can you care for yourself at home? · To relieve pain, use heat on the affected area. ¨ Put a warm water bottle, a heating pad set on low, or a warm cloth on your neck. Put a thin cloth between the heating pad and your skin. Do not go to sleep with a heating pad on your skin. · Ask your doctor if you can take acetaminophen (such as Tylenol) or nonsteroidal anti-inflammatory drugs, such as ibuprofen or naproxen. Your doctor can prescribe stronger medicines if needed. Be safe with medicines. Read and follow all instructions on the label. · Stretch your muscles every day, especially before and after exercise and at bedtime. Regular stretching can help relax your muscles. · Try to find a pillow and a position in bed that help improve your night's rest.  · Try to stay active.  It's best to start activity slowly. If an exercise makes your pain worse, stop doing it. When should you call for help? Call 911 anytime you think you may need emergency care. For example, call if:  · You are unable to move an arm or a leg at all. Call your doctor now or seek immediate medical care if:  · You have new or worse symptoms in your arms, legs, belly, or buttocks. Symptoms may include:  ¨ Numbness or tingling. ¨ Weakness. ¨ Pain. · You lose bladder or bowel control. Watch closely for changes in your health, and be sure to contact your doctor if:  · You do not get better as expected. Where can you learn more? Go to http://travis-benita.info/. Enter T283 in the search box to learn more about \"Neck Spasm: Care Instructions. \"  Current as of: March 21, 2017  Content Version: 11.3  © 2887-9580 [employer]. Care instructions adapted under license by [employer] (which disclaims liability or warranty for this information). If you have questions about a medical condition or this instruction, always ask your healthcare professional. [address] any warranty or liability for your use of this information.

## 2017-08-04 NOTE — MR AVS SNAPSHOT
Visit Information Date & Time Provider Department Dept. Phone Encounter #  
 8/4/2017  2:00 PM Farzaneh Liang [facility] [phone] [id number] Follow-up Instructions Return if symptoms worsen or fail to improve. Your Appointments 9/11/2017 10:30 AM  
LAB with LAB [facility] Appt Note: Fasting Lab ([facility])/$0CP - r/s from PCP out 9/25; [facility]  
 [address] [facility] [address] [phone]  
  
   
 8 [facility] [address] 9/18/2017 10:30 AM  
ACUTE CARE with Farzaneh Liang MD  
[facility] Appt Note: 4 month f/u; thyroid, htn, ckd, chol/labs prior/$0CP -; 4 month f/u; thyroid, htn, ckd, chol/labs prior/$0CP -  
 [address] [facility] [address] [phone]  
  
   
 8 [facility] [address] Upcoming Health Maintenance Date Due INFLUENZA AGE 9 TO ADULT 8/1/2017 MEDICARE YEARLY EXAM 1/18/2018 GLAUCOMA SCREENING Q2Y 5/23/2019 DTaP/Tdap/Td series (2 - Td) 11/27/2025 Allergies as of 8/4/2017  Review Complete On: 8/4/2017 By: Farzaneh Liang MD  
  
 Severity Noted Reaction Type Reactions Lyrica [Pregabalin]  05/17/2010    Other (comments) Couldn't function Macrobid [Nitrofurantoin Monohyd/m-cryst]  05/17/2010    Other (comments)  
 sick Neurontin [Gabapentin]  05/17/2010    Other (comments) kaitlyn Current Immunizations  Reviewed on 8/4/2017 Name Date Pneumococcal Conjugate (PCV-13) 4/12/2016 Pneumococcal Polysaccharide (PPSV-23) 9/4/2013 TD Vaccine 9/1/2008 Tdap 11/27/2015  6:08 PM  
 ZZZ-RETIRED (DO NOT USE) Pneumococcal Vaccine (Unspecified Type) 8/1/2005 Zoster Vaccine, Live 11/12/2013  Reviewed by Daiana Cornejo LPN on 2/7/5470 at  3:71 PM  
You Were Diagnosed With   
  
 Codes Comments Cervical paraspinal muscle spasm    -  Primary ICD-10-CM: R92.280 ICD-9-CM: 728.85 Vitals BP Pulse Temp Resp Height(growth percentile) Weight(growth percentile) 132/66 (BP 1 Location: Left arm, BP Patient Position: Sitting) 87 96.5 °F (35.8 °C) (Oral) 14 5' 7\" (1.702 m) 133 lb (60.3 kg) SpO2 BMI OB Status Smoking Status 99% 20.83 kg/m2 Hysterectomy Never Smoker Vitals History BMI and BSA Data Body Mass Index Body Surface Area  
 20.83 kg/m 2 1.69 m 2 Preferred Pharmacy Pharmacy Name Phone [facility] [place], [place] - [address] [place] [address]  [phone] Your Updated Medication List  
  
   
This list is accurate as of: 8/4/17  2:23 PM.  Always use your most recent med list.  
  
  
  
  
 aspirin delayed-release 81 mg tablet Take 1 Tab by mouth daily. hydroCHLOROthiazide 12.5 mg capsule Commonly known as:  Bill Knights TAKE ONE CAPSULE BY MOUTH ONCE DAILY  
  
 levothyroxine 50 mcg tablet Commonly known as:  SYNTHROID Take 2 tablet on Sundays and one the other 6 days of the week  
  
 nortriptyline 10 mg capsule Commonly known as:  PAMELOR  
TAKE ONE TO TWO CAPSULES BY MOUTH AT BEDTIME FOR SHINGLES PAIN  
  
 polyethylene glycol 17 gram/dose powder Commonly known as:  Iliana Hodgkin Take 17 g by mouth two (2) times a day. 1 tablespoon with 8 oz of water daily  
  
 simvastatin 20 mg tablet Commonly known as:  ZOCOR Take 1 Tab by mouth nightly. traMADol 50 mg tablet Commonly known as:  ULTRAM  
Take 1 Tab by mouth every six (6) hours as needed for Pain. Max Daily Amount: 200 mg. Indications: PAIN  
  
 TYLENOL EXTRA STRENGTH 500 mg tablet Generic drug:  acetaminophen Take  by mouth every six (6) hours as needed. Follow-up Instructions Return if symptoms worsen or fail to improve. Patient Instructions May continue Tylenol as needed for pain.  Do not more than 2 tablets 4 times a day. Neck Spasm: Care Instructions Your Care Instructions A neck spasm is sudden tightness and pain in your neck muscles. A spasm may be caused by some activities or repeated movements. For example, you may be more likely to have a neck spasm if you slouch, paint a ceiling, work at a computer, or sleep with your neck twisted. But the cause isn't always clear. Home treatment includes using heat or ice, taking over-the-counter (OTC) pain medicines, and avoiding activities that may lead to neck pain. Gentle stretching, or treatments such as massage or manipulation, may also help ease a neck spasm. For a neck spasm that doesn't get better with home care, your doctor may prescribe medicine. He or she may also suggest exercise or physical therapy to help strengthen or relax your neck muscles. Follow-up care is a key part of your treatment and safety. Be sure to make and go to all appointments, and call your doctor if you are having problems. It's also a good idea to know your test results and keep a list of the medicines you take. How can you care for yourself at home? · To relieve pain, use heat on the affected area. ¨ Put a warm water bottle, a heating pad set on low, or a warm cloth on your neck. Put a thin cloth between the heating pad and your skin. Do not go to sleep with a heating pad on your skin. · Ask your doctor if you can take acetaminophen (such as Tylenol) or nonsteroidal anti-inflammatory drugs, such as ibuprofen or naproxen. Your doctor can prescribe stronger medicines if needed. Be safe with medicines. Read and follow all instructions on the label. · Stretch your muscles every day, especially before and after exercise and at bedtime. Regular stretching can help relax your muscles. · Try to find a pillow and a position in bed that help improve your night's rest. 
· Try to stay active. It's best to start activity slowly. If an exercise makes your pain worse, stop doing it. When should you call for help? Call 911 anytime you think you may need emergency care. For example, call if: 
· You are unable to move an arm or a leg at all. Call your doctor now or seek immediate medical care if: 
· You have new or worse symptoms in your arms, legs, belly, or buttocks. Symptoms may include: ¨ Numbness or tingling. ¨ Weakness. ¨ Pain. · You lose bladder or bowel control. Watch closely for changes in your health, and be sure to contact your doctor if: 
· You do not get better as expected. Where can you learn more? Go to http://travis-benita.info/. Enter R853 in the search box to learn more about \"Neck Spasm: Care Instructions. \" Current as of: March 21, 2017 Content Version: 11.3 © 9279-2896 [employer]. Care instructions adapted under license by [employer] (which disclaims liability or warranty for this information). If you have questions about a medical condition or this instruction, always ask your healthcare professional. [place] [address] any warranty or liability for your use of this information. Introducing [facility]! 3 [facility] introduces VoipSwitch patient portal. Now you can access parts of your medical record, email your doctor's office, and request medication refills online. 1. In your internet browser, go to [email or web address] 2. Click on the First Time User? Click Here link in the Sign In box. You will see the New Member Sign Up page. 3. Enter your VoipSwitch Access Code exactly as it appears below. You will not need to use this code after youve completed the sign-up process. If you do not sign up before the expiration date, you must request a new code. · VoipSwitch Access Code: [email or web address] Expires: 8/23/2017  2:26 PM 
 
4. Enter the last four digits of your Social Security Number (xxxx) and Date of Birth (mm/dd/yyyy) as indicated and click Submit.  You will be taken to the next sign-up page. 5. Create a Runivermag ID. This will be your Runivermag login ID and cannot be changed, so think of one that is secure and easy to remember. 6. Create a Runivermag password. You can change your password at any time. 7. Enter your Password Reset Question and Answer. This can be used at a later time if you forget your password. 8. Enter your e-mail address. You will receive e-mail notification when new information is available in [address]. 9. Click Sign Up. You can now view and download portions of your medical record. 10. Click the Download Summary menu link to download a portable copy of your medical information. If you have questions, please visit the Frequently Asked Questions section of the Runivermag website. Remember, Runivermag is NOT to be used for urgent needs. For medical emergencies, dial 911. Now available from your iPhone and Android! Please provide this summary of care documentation to your next provider. Your primary care clinician is listed as Jonathan Santiago. If you have any questions after today's visit, please call [phone].

## 2017-08-08 RX ORDER — HYDROCHLOROTHIAZIDE 12.5 MG/1
CAPSULE ORAL
Qty: 90 CAP | Refills: 1 | Status: SHIPPED | OUTPATIENT
Start: 2017-08-08 | End: 2018-02-06 | Stop reason: SDUPTHER

## 2017-09-11 ENCOUNTER — APPOINTMENT (OUTPATIENT)
Dept: INTERNAL MEDICINE CLINIC | Age: 82
End: 2017-09-11

## 2017-09-11 ENCOUNTER — HOSPITAL ENCOUNTER (OUTPATIENT)
Dept: LAB | Age: 82
Discharge: HOME | End: 2017-09-11
Payer: MEDICARE

## 2017-09-11 DIAGNOSIS — I12.9 BENIGN HYPERTENSION WITH CHRONIC KIDNEY DISEASE, STAGE III (HCC): ICD-10-CM

## 2017-09-11 DIAGNOSIS — E78.00 HYPERCHOLESTEROLEMIA: ICD-10-CM

## 2017-09-11 DIAGNOSIS — N18.30 BENIGN HYPERTENSION WITH CHRONIC KIDNEY DISEASE, STAGE III (HCC): ICD-10-CM

## 2017-09-11 PROCEDURE — 80048 BASIC METABOLIC PNL TOTAL CA: CPT

## 2017-09-11 PROCEDURE — 80061 LIPID PANEL: CPT

## 2017-09-11 PROCEDURE — 36415 COLL VENOUS BLD VENIPUNCTURE: CPT

## 2017-09-12 LAB
BUN SERPL-MCNC: 32 MG/DL (ref 10–36)
BUN/CREAT SERPL: 21 (ref 12–28)
CALCIUM SERPL-MCNC: 9.3 MG/DL (ref 8.7–10.3)
CHLORIDE SERPL-SCNC: 99 MMOL/L (ref 96–106)
CHOLEST SERPL-MCNC: 150 MG/DL (ref 100–199)
CO2 SERPL-SCNC: 26 MMOL/L (ref 18–29)
CREAT SERPL-MCNC: 1.55 MG/DL (ref 0.57–1)
GLUCOSE SERPL-MCNC: 101 MG/DL (ref 65–99)
HDLC SERPL-MCNC: 99 MG/DL
INTERPRETATION, 910389: NORMAL
INTERPRETATION: NORMAL
LDLC SERPL CALC-MCNC: 44 MG/DL (ref 0–99)
PDF IMAGE, 910387: NORMAL
POTASSIUM SERPL-SCNC: 4.6 MMOL/L (ref 3.5–5.2)
SODIUM SERPL-SCNC: 143 MMOL/L (ref 134–144)
TRIGL SERPL-MCNC: 37 MG/DL (ref 0–149)
VLDLC SERPL CALC-MCNC: 7 MG/DL (ref 5–40)

## 2017-09-14 RX ORDER — SIMVASTATIN 20 MG/1
TABLET, FILM COATED ORAL
Qty: 30 TAB | Refills: 11 | Status: SHIPPED | OUTPATIENT
Start: 2017-09-14 | End: 2018-08-21 | Stop reason: SDUPTHER

## 2017-09-18 ENCOUNTER — OFFICE VISIT (OUTPATIENT)
Dept: INTERNAL MEDICINE CLINIC | Age: 82
End: 2017-09-18

## 2017-09-18 VITALS
DIASTOLIC BLOOD PRESSURE: 64 MMHG | RESPIRATION RATE: 20 BRPM | OXYGEN SATURATION: 96 % | SYSTOLIC BLOOD PRESSURE: 122 MMHG | HEIGHT: 67 IN | HEART RATE: 70 BPM | TEMPERATURE: 97 F | WEIGHT: 132 LBS | BODY MASS INDEX: 20.72 KG/M2

## 2017-09-18 DIAGNOSIS — E03.4 HYPOTHYROIDISM DUE TO ACQUIRED ATROPHY OF THYROID: ICD-10-CM

## 2017-09-18 DIAGNOSIS — R10.32 LEFT LOWER QUADRANT PAIN: ICD-10-CM

## 2017-09-18 DIAGNOSIS — I73.9 ASYMPTOMATIC PVD (PERIPHERAL VASCULAR DISEASE) (HCC): ICD-10-CM

## 2017-09-18 DIAGNOSIS — L60.8 TOENAIL DEFORMITY: ICD-10-CM

## 2017-09-18 DIAGNOSIS — L98.9 SKIN LESION OF LEFT LOWER LIMB: ICD-10-CM

## 2017-09-18 DIAGNOSIS — I12.9 BENIGN HYPERTENSION WITH CHRONIC KIDNEY DISEASE, STAGE III (HCC): Primary | ICD-10-CM

## 2017-09-18 DIAGNOSIS — Z86.73 CEREBROVASCULAR DISEASE, ARTERIOSCLEROTIC, POST-STROKE: ICD-10-CM

## 2017-09-18 DIAGNOSIS — M15.9 PRIMARY OSTEOARTHRITIS INVOLVING MULTIPLE JOINTS: ICD-10-CM

## 2017-09-18 DIAGNOSIS — E78.00 HYPERCHOLESTEROLEMIA: ICD-10-CM

## 2017-09-18 DIAGNOSIS — R19.7 DIARRHEA, UNSPECIFIED TYPE: ICD-10-CM

## 2017-09-18 DIAGNOSIS — I67.2 CEREBROVASCULAR DISEASE, ARTERIOSCLEROTIC, POST-STROKE: ICD-10-CM

## 2017-09-18 DIAGNOSIS — N18.30 BENIGN HYPERTENSION WITH CHRONIC KIDNEY DISEASE, STAGE III (HCC): Primary | ICD-10-CM

## 2017-09-18 RX ORDER — CIPROFLOXACIN 500 MG/1
500 TABLET ORAL 2 TIMES DAILY
Qty: 20 TAB | Refills: 0 | Status: SHIPPED | OUTPATIENT
Start: 2017-09-18 | End: 2017-11-20 | Stop reason: ALTCHOICE

## 2017-09-18 RX ORDER — METRONIDAZOLE 500 MG/1
500 TABLET ORAL 3 TIMES DAILY
Qty: 30 TAB | Refills: 0 | Status: SHIPPED | OUTPATIENT
Start: 2017-09-18 | End: 2018-02-02 | Stop reason: ALTCHOICE

## 2017-09-18 NOTE — PROGRESS NOTES
Frances Estes    Chief Complaint   Patient presents with    Blood Pressure Check    Thyroid Problem    Chronic Kidney Disease    Cholesterol Problem       Reviewed record In preparation for visit and have obtained necessary documentation. Will bring a copy of advanced directive / living will. 1. Have you been to the ER, urgent care clinic or hospitalized since your last visit? No     2. Have you seen or consulted any other health care providers outside of the [address] [place] [address] since your last visit? Include any pap smears or colon screening. No    Vitals reviewed with provider.     Health Maintenance reviewed:

## 2017-09-18 NOTE — PATIENT INSTRUCTIONS
If stomach is not improving with ciprofloxacin and metronidazole, let me know in 2-3 day. Drink at least 2 quarts of fluids a day. 12 ounces with each meal, and again between breakfast and lunch and lunch and dinner. Try Lactaid or Soy milk or almond milk. Office visit in 4 months with fasting lab work a week before visit. Diverticulitis: Care Instructions  Your Care Instructions    Diverticulitis occurs when pouches form in the wall of the colon and become inflamed or infected. It can be very painful. Doctors aren't sure what causes diverticulitis. There is no proof that foods such as nuts, seeds, or berries cause it or make it worse. A low-fiber diet may cause the colon to work harder to push stool forward. Pouches may form because of this extra work. It may be hard to think about healthy eating while you're in pain. But as you recover, you might think about how you can use healthy eating for overall better health. Healthy eating may help you avoid future attacks. Follow-up care is a key part of your treatment and safety. Be sure to make and go to all appointments, and call your doctor if you are having problems. It's also a good idea to know your test results and keep a list of the medicines you take. How can you care for yourself at home? · Drink plenty of fluids, enough so that your urine is light yellow or clear like water. If you have kidney, heart, or liver disease and have to limit fluids, talk with your doctor before you increase the amount of fluids you drink. · Stick to liquids or a bland diet (plain rice, bananas, dry toast or crackers, applesauce) until you are feeling better. Then you can return to regular foods and gradually increase the amount of fiber in your diet. · Use a heating pad set on low on your belly to relieve mild cramps and pain. · Get extra rest until you are feeling better. · Be safe with medicines. Read and follow all instructions on the label.   ¨ If the doctor gave you a prescription medicine for pain, take it as prescribed. ¨ If you are not taking a prescription pain medicine, ask your doctor if you can take an over-the-counter medicine. · If your doctor prescribed antibiotics, take them as directed. Do not stop taking them just because you feel better. You need to take the full course of antibiotics. To prevent future attacks of diverticulitis  · Avoid constipation:  ¨ Include fruits, vegetables, beans, and whole grains in your diet each day. These foods are high in fiber. ¨ Drink plenty of fluids, enough so that your urine is light yellow or clear like water. If you have kidney, heart, or liver disease and have to limit fluids, talk with your doctor before you increase the amount of fluids you drink. ¨ Get some exercise every day. Build up slowly to 30 to 60 minutes a day on 5 or more days of the week. ¨ Take a fiber supplement, such as Citrucel or Metamucil, every day if needed. Read and follow all instructions on the label. ¨ Schedule time each day for a bowel movement. Having a daily routine may help. Take your time and do not strain when having a bowel movement. When should you call for help? Call 911 anytime you think you may need emergency care. For example, call if:  · You passed out (lost consciousness). · You vomit blood or what looks like coffee grounds. · You pass maroon or very bloody stools. Call your doctor now or seek immediate medical care if:  · You have severe pain or swelling in your belly. · You have a new or higher fever. · You cannot keep down fluids or medicines. · You have new pain that gets worse when you move or cough. Watch closely for changes in your health, and be sure to contact your doctor if:  · The symptoms you had when you first started feeling sick come back. · You do not get better as expected. Where can you learn more? Go to http://travis-benita.info/.   Enter H901 in the search box to learn more about \"Diverticulitis: Care Instructions. \"  Current as of: August 9, 2016  Content Version: 11.3  © 1398-5202 [employer]. Care instructions adapted under license by [employer] (which disclaims liability or warranty for this information). If you have questions about a medical condition or this instruction, always ask your healthcare professional. [address] any warranty or liability for your use of this information.

## 2017-09-18 NOTE — MR AVS SNAPSHOT
Visit Information Date & Time Provider Department Dept. Phone Encounter #  
 9/18/2017 10:30 AM Kike Hollingsworth MD Avis Lockett [phone] [id number] Follow-up Instructions Return in about 4 months (around 1/18/2018) for HTN, CKD, chol, thyroid Fasting lab one week prior . Upcoming Health Maintenance Date Due INFLUENZA AGE 9 TO ADULT 8/1/2017 MEDICARE YEARLY EXAM 1/18/2018 GLAUCOMA SCREENING Q2Y 5/23/2019 DTaP/Tdap/Td series (2 - Td) 11/27/2025 Allergies as of 9/18/2017  Review Complete On: 9/18/2017 By: Kike Hollingsworth MD  
  
 Severity Noted Reaction Type Reactions Lyrica [Pregabalin]  05/17/2010    Other (comments) Couldn't function Macrobid [Nitrofurantoin Monohyd/m-cryst]  05/17/2010    Other (comments)  
 sick Neurontin [Gabapentin]  05/17/2010    Other (comments) [email or web address] Current Immunizations  Reviewed on 9/18/2017 Name Date Pneumococcal Conjugate (PCV-13) 4/12/2016 Pneumococcal Polysaccharide (PPSV-23) 9/4/2013 TD Vaccine 9/1/2008 Tdap 11/27/2015  6:08 PM  
 ZZZ-RETIRED (DO NOT USE) Pneumococcal Vaccine (Unspecified Type) 8/1/2005 Zoster Vaccine, Live 11/12/2013 Reviewed by Nelia Sellers LPN on 2/75/7193 at 83:20 AM  
You Were Diagnosed With   
  
 Codes Comments Benign hypertension with chronic kidney disease, stage III    -  Primary ICD-10-CM: I12.9, N18.3 ICD-9-CM: 403.10, 585.3 Hypothyroidism due to acquired atrophy of thyroid     ICD-10-CM: E03.4 ICD-9-CM: 244.8, 246.8 Cerebrovascular disease, arteriosclerotic, post-stroke     ICD-10-CM: I67.2, Z86.73 
ICD-9-CM: 437.0, V12.54 Asymptomatic PVD (peripheral vascular disease) ([facility]     ICD-10-CM: I73.9 ICD-9-CM: 443.9 Primary osteoarthritis involving multiple joints     ICD-10-CM: M15.0 ICD-9-CM: 715.09  Left lower quadrant pain     ICD-10-CM: R10.32 
ICD-9-CM: 789.04   
 Diarrhea, unspecified type     ICD-10-CM: R19.7 ICD-9-CM: 787.91 Skin lesion of left lower limb     ICD-10-CM: L98.9 ICD-9-CM: 709.9 Toenail deformity     ICD-10-CM: L60.8 ICD-9-CM: 703.9 Hypercholesterolemia     ICD-10-CM: E78.00 ICD-9-CM: 272.0 Vitals BP Pulse Temp Resp Height(growth percentile) Weight(growth percentile) 122/64 (BP 1 Location: Left arm, BP Patient Position: Sitting) 70 97 °F (36.1 °C) (Oral) 20 5' 7\" (1.702 m) 132 lb (59.9 kg) SpO2 BMI OB Status Smoking Status 96% 20.67 kg/m2 Hysterectomy Never Smoker Vitals History BMI and BSA Data Body Mass Index Body Surface Area  
 20.67 kg/m 2 1.68 m 2 Preferred Pharmacy Pharmacy Name Phone [facility] [address] Cyn Pedersen [phone] Your Updated Medication List  
  
   
This list is accurate as of: 9/18/17 12:17 PM.  Always use your most recent med list.  
  
  
  
  
 aspirin delayed-release 81 mg tablet Take 1 Tab by mouth daily. ciprofloxacin HCl 500 mg tablet Commonly known as:  CIPRO Take 1 Tab by mouth two (2) times a day. hydroCHLOROthiazide 12.5 mg capsule Commonly known as:  Tiara Hesselbach TAKE ONE CAPSULE BY MOUTH ONCE DAILY  
  
 levothyroxine 50 mcg tablet Commonly known as:  SYNTHROID Take 2 tablet on Sundays and one the other 6 days of the week  
  
 metroNIDAZOLE 500 mg tablet Commonly known as:  FLAGYL Take 1 Tab by mouth three (3) times daily. nortriptyline 10 mg capsule Commonly known as:  PAMELOR  
TAKE ONE TO TWO CAPSULES BY MOUTH AT BEDTIME FOR SHINGLES PAIN  
  
 polyethylene glycol 17 gram/dose powder Commonly known as:  Rodarte Lies Take 17 g by mouth two (2) times a day. 1 tablespoon with 8 oz of water daily  
  
 simvastatin 20 mg tablet Commonly known as:  ZOCOR  
TAKE ONE TABLET BY MOUTH NIGHTLY.  
  
 traMADol 50 mg tablet Commonly known as:  Deb Carrington  
 Take 1 Tab by mouth every six (6) hours as needed for Pain. Max Daily Amount: 200 mg. Indications: PAIN  
  
 TYLENOL EXTRA STRENGTH 500 mg tablet Generic drug:  acetaminophen Take  by mouth every six (6) hours as needed. Prescriptions Sent to Pharmacy Refills  
 ciprofloxacin HCl (CIPRO) 500 mg tablet 0 Sig: Take 1 Tab by mouth two (2) times a day. Class: Normal  
 Pharmacy: [id number] [place] [address] Ph #: [phone] Route: Oral  
 metroNIDAZOLE (FLAGYL) 500 mg tablet 0 Sig: Take 1 Tab by mouth three (3) times daily. Class: Normal  
 Pharmacy: [id number] [place] [address] Ph #: [phone] Route: Oral  
  
We Performed the Following REFERRAL TO DERMATOLOGY [REF19 Custom] Comments:  
 Please evaluate patient for hard inflamed appearing. skin lesion on right lateral lower leg. REFERRAL TO PODIATRY [REF90 Custom] Follow-up Instructions Return in about 4 months (around 1/18/2018) for HTN, CKD, chol, thyroid Fasting lab one week prior . To-Do List   
 01/18/2018 Lab:  LIPID PANEL   
  
 01/18/2018 Lab:  METABOLIC PANEL, COMPREHENSIVE Referral Information Referral ID Referred By Referred To  
  
 [id number] SUNITA, 98 Amie Adamson MD   
   [address] Phone: [phone] Fax: [phone] Visits Status Start Date End Date 1 New Request 9/18/17 9/18/18 If your referral has a status of pending review or denied, additional information will be sent to support the outcome of this decision. Referral ID Referred By Referred To  
 [id number] KENDAL 9 [facility], [address] [facility] Phone: [phone] Fax: [phone] Visits Status Start Date End Date 1 New Request 9/18/17 9/18/18 If your referral has a status of pending review or denied, additional information will be sent to support the outcome of this decision. Patient Instructions If stomach is not improving with ciprofloxacin and metronidazole, let me know in 2-3 day. Drink at least 2 quarts of fluids a day. 12 ounces with each meal, and again between breakfast and lunch and lunch and dinner. Try Lactaid or Soy milk or almond milk. Office visit in 4 months with fasting lab work a week before visit. Diverticulitis: Care Instructions Your Care Instructions Diverticulitis occurs when pouches form in the wall of the colon and become inflamed or infected. It can be very painful. Doctors aren't sure what causes diverticulitis. There is no proof that foods such as nuts, seeds, or berries cause it or make it worse. A low-fiber diet may cause the colon to work harder to push stool forward. Pouches may form because of this extra work. It may be hard to think about healthy eating while you're in pain. But as you recover, you might think about how you can use healthy eating for overall better health. Healthy eating may help you avoid future attacks. Follow-up care is a key part of your treatment and safety. Be sure to make and go to all appointments, and call your doctor if you are having problems. It's also a good idea to know your test results and keep a list of the medicines you take. How can you care for yourself at home? · Drink plenty of fluids, enough so that your urine is light yellow or clear like water. If you have kidney, heart, or liver disease and have to limit fluids, talk with your doctor before you increase the amount of fluids you drink. · Stick to liquids or a bland diet (plain rice, bananas, dry toast or crackers, applesauce) until you are feeling better. Then you can return to regular foods and gradually increase the amount of fiber in your diet. · Use a heating pad set on low on your belly to relieve mild cramps and pain. · Get extra rest until you are feeling better. · Be safe with medicines. Read and follow all instructions on the label. ¨ If the doctor gave you a prescription medicine for pain, take it as prescribed. ¨ If you are not taking a prescription pain medicine, ask your doctor if you can take an over-the-counter medicine. · If your doctor prescribed antibiotics, take them as directed. Do not stop taking them just because you feel better. You need to take the full course of antibiotics. To prevent future attacks of diverticulitis · Avoid constipation: 
¨ Include fruits, vegetables, beans, and whole grains in your diet each day. These foods are high in fiber. ¨ Drink plenty of fluids, enough so that your urine is light yellow or clear like water. If you have kidney, heart, or liver disease and have to limit fluids, talk with your doctor before you increase the amount of fluids you drink. ¨ Get some exercise every day. Build up slowly to 30 to 60 minutes a day on 5 or more days of the week. ¨ Take a fiber supplement, such as Citrucel or Metamucil, every day if needed. Read and follow all instructions on the label. ¨ Schedule time each day for a bowel movement. Having a daily routine may help. Take your time and do not strain when having a bowel movement. When should you call for help? Call 911 anytime you think you may need emergency care. For example, call if: 
· You passed out (lost consciousness). · You vomit blood or what looks like coffee grounds. · You pass maroon or very bloody stools. Call your doctor now or seek immediate medical care if: 
· You have severe pain or swelling in your belly. · You have a new or higher fever. · You cannot keep down fluids or medicines. · You have new pain that gets worse when you move or cough. Watch closely for changes in your health, and be sure to contact your doctor if: · The symptoms you had when you first started feeling sick come back. · You do not get better as expected. Where can you learn more? Go to http://travis-benita.info/. Enter H901 in the search box to learn more about \"Diverticulitis: Care Instructions. \" Current as of: August 9, 2016 Content Version: 11.3 © 6073-9352 [employer]. Care instructions adapted under license by [employer] (which disclaims liability or warranty for this information). If you have questions about a medical condition or this instruction, always ask your healthcare professional. [address] any warranty or liability for your use of this information. Introducing \[facility]\"" & HEALTH SERVICES! Michael Franz introduces JustSpotted patient portal. Now you can access parts of your medical record, email your doctor's office, and request medication refills online. 1. In your internet browser, go to [email or web address] 2. Click on the First Time User? Click Here link in the Sign In box. You will see the New Member Sign Up page. 3. Enter your JustSpotted Access Code exactly as it appears below. You will not need to use this code after youve completed the sign-up process. If you do not sign up before the expiration date, you must request a new code. · JustSpotted Access Code: [email or web address] Expires: 12/17/2017 12:17 PM 
 
4. Enter the last four digits of your Social Security Number (xxxx) and Date of Birth (mm/dd/yyyy) as indicated and click Submit. You will be taken to the next sign-up page. 5. Create a 3FLOZt ID. This will be your JustSpotted login ID and cannot be changed, so think of one that is secure and easy to remember. 6. Create a JustSpotted password. You can change your password at any time. 7. Enter your Password Reset Question and Answer. This can be used at a later time if you forget your password. 8. Enter your e-mail address. You will receive e-mail notification when new information is available in [address] 9. Click Sign Up. You can now view and download portions of your medical record. 10. Click the Download Summary menu link to download a portable copy of your medical information. If you have questions, please visit the Frequently Asked Questions section of the [employer] website. Remember, [employer] is NOT to be used for urgent needs. For medical emergencies, dial 911. Now available from your iPhone and Android! Please provide this summary of care documentation to your next provider. Your primary care clinician is listed as Ranjan Ramon. If you have any questions after today's visit, please call [phone].

## 2017-09-18 NOTE — PROGRESS NOTES
HISTORY OF PRESENT ILLNESS  Catracho Yousif is a 80 y.o. female. HPI  She presents for follow up of hypertension, hyperlipidemia, peripheral vascular disease and history of prior stroke. Diet and Lifestyle: generally follows a low fat low cholesterol diet, generally follows a low sodium diet, sedentary, nonsmoker  Medication compliance: compliant all of the time  Medication side effects: none  Home BP Monitoring:  Fluctuates a great deal  Cardiovascular ROS:  She complains of dyspnea on exertion, dizziness. She denies palpitations, orthopnea, exertional chest pressure/discomfort, claudication, lower extremity edema     She presents for follow up of hypothyroidism. Symptoms include change in energy level. Patient denies cold intolerance. Course to date has been stable. Patient complains of diarrhea. Onset of diarrhea was 3 days ago. Diarrhea is occurring approximately 3 times per day. Patient describes diarrhea as watery and semisolid. Diarrhea has been associated with abdominal pain cramping and weight loss of 5 lbs since 3 days ago, and 13 lbs in the past year. Patient denies fever, blood in stool, recent antibiotic use, illness in household contacts, recent travel. Previous visits for diarrhea: none. Evaluation to date: none. Treatment to date: none. Seems to be better today. No stools today as of 1150.     Patient Active Problem List   Diagnosis Code    Postherpetic neuralgia B02.29    Cerebrovascular disease, arteriosclerotic, post-stroke I67.2, Z86.73    Asymptomatic PVD [facility]peripheral vascular disease) ([facility]) I73.9    CKD (chronic kidney disease) stage 3, GFR 30-59 ml/min N18.3    Hypercholesterolemia E78.00    Palpitations R00.2    Benign hypertension with chronic kidney disease, stage III I12.9, N18.3    Advance directive discussed with patient Z70.80    Hypothyroidism due to acquired atrophy of thyroid E03.4     Past Medical History:   Diagnosis Date    Chronic kidney disease     Hypercholesteremia     Hypertension     Post herpetic neuralgia     [facility]     Thyroid disease      Past Surgical History:   Procedure Laterality Date    HX APPENDECTOMY      HX HYSTERECTOMY  1947    one ovary out     Social History     Social History    Marital status:      Spouse name: N/A    Number of children: N/A    Years of education: N/A     Social History Main Topics    Smoking status: Never Smoker    Smokeless tobacco: Never Used    Alcohol use No    Drug use: No    Sexual activity: Not Asked     Other Topics Concern    None     Social History Narrative     Family History   Problem Relation Age of Onset    Cancer Father      Allergies   Allergen Reactions    Lyrica [Pregabalin] Other (comments)     Couldn't function    Macrobid [Nitrofurantoin Monohyd/M-Cryst] Other (comments)     sick    Neurontin [Gabapentin] Other (comments)     groggy     Current Outpatient Prescriptions   Medication Sig Dispense Refill    simvastatin (ZOCOR) 20 mg tablet TAKE ONE TABLET BY MOUTH NIGHTLY. 30 Tab 11    hydroCHLOROthiazide (MICROZIDE) 12.5 mg capsule TAKE ONE CAPSULE BY MOUTH ONCE DAILY 90 Cap 1    aspirin delayed-release 81 mg tablet Take 1 Tab by mouth daily. 90 Tab 3    nortriptyline (PAMELOR) 10 mg capsule TAKE ONE TO TWO CAPSULES BY MOUTH AT BEDTIME FOR SHINGLES PAIN 60 Cap 5    traMADol (ULTRAM) 50 mg tablet Take 1 Tab by mouth every six (6) hours as needed for Pain. Max Daily Amount: 200 mg. Indications: PAIN 30 Tab 1    polyethylene glycol (MIRALAX) 17 gram/dose powder Take 17 g by mouth two (2) times a day. 1 tablespoon with 8 oz of water daily 510 g 0    levothyroxine (SYNTHROID) 50 mcg tablet Take 2 tablet on Sundays and one the other 6 days of the week 105 Tab 3    acetaminophen (TYLENOL EXTRA STRENGTH) 500 mg tablet Take  by mouth every six (6) hours as needed. Review of Systems   Constitutional: Negative for malaise/fatigue and weight loss.    Gastrointestinal: Positive for diarrhea. Negative for constipation and heartburn. Musculoskeletal: Positive for back pain and joint pain. Neurological: Positive for dizziness. Negative for tingling and focal weakness. Visit Vitals    /64 (BP 1 Location: Left arm, BP Patient Position: Sitting)    Pulse 70    Temp 97 °F (36.1 °C) (Oral)    Resp 20    Ht 5' 7\" (1.702 m)    Wt 132 lb (59.9 kg)    SpO2 96%    BMI 20.67 kg/m2     Physical Exam   Constitutional: She is oriented to person, place, and time. She appears well-developed and well-nourished. HENT:   Head: Normocephalic and atraumatic. Eyes: Conjunctivae are normal. Pupils are equal, round, and reactive to light. Neck: Neck supple. Carotid bruit is not present. No thyromegaly present. Cardiovascular: Normal rate, regular rhythm and normal heart sounds. PMI is not displaced. Exam reveals no gallop. No murmur heard. Pulses:       Dorsalis pedis pulses are 0 on the right side, and 0 on the left side. Posterior tibial pulses are 0 on the right side, and 0 on the left side. Pulmonary/Chest: Effort normal. She has no wheezes. She has no rhonchi. She has no rales. Abdominal: Soft. Normal appearance. She exhibits no abdominal bruit and no mass. There is no hepatosplenomegaly. There is tenderness in the left upper quadrant. There is no rebound and no guarding. Musculoskeletal: She exhibits no edema. Lymphadenopathy:     She has no cervical adenopathy. Right: No supraclavicular adenopathy present. Left: No supraclavicular adenopathy present. Neurological: She is alert and oriented to person, place, and time. No sensory deficit. Skin: Skin is warm, dry and intact. Lesion noted. No rash noted. Psychiatric: She has a normal mood and affect. Her behavior is normal.   Nursing note and vitals reviewed.     Results for orders placed or performed in visit on 46/41/42   METABOLIC PANEL, BASIC   Result Value Ref Range Glucose 101 (H) 65 - 99 mg/dL    BUN 32 10 - 36 mg/dL    Creatinine 1.55 (H) 0.57 - 1.00 mg/dL    GFR est non-AA 26 (L) >59 mL/min/1.73    GFR est AA 30 (L) >59 mL/min/1.73    BUN/Creatinine ratio 21 12 - 28    Sodium 143 134 - 144 mmol/L    Potassium 4.6 3.5 - 5.2 mmol/L    Chloride 99 96 - 106 mmol/L    CO2 26 18 - 29 mmol/L    Calcium 9.3 8.7 - 10.3 mg/dL   LIPID PANEL   Result Value Ref Range    Cholesterol, total 150 100 - 199 mg/dL    Triglyceride 37 0 - 149 mg/dL    HDL Cholesterol 99 >39 mg/dL    VLDL, calculated 7 5 - 40 mg/dL    LDL, calculated 44 0 - 99 mg/dL   CKD REPORT   Result Value Ref Range    Interpretation Note    CVD REPORT   Result Value Ref Range    INTERPRETATION NTAP     PDF IMAGE Not applicable        ASSESSMENT and PLAN    ICD-10-CM ICD-9-CM    1. Benign hypertension with chronic kidney disease, stage III I12.9 403.10     N18.3 585.3    2. Hypothyroidism due to acquired atrophy of thyroid E03.4 244.8      246.8    3. Cerebrovascular disease, arteriosclerotic, post-stroke I67.2 437.0     Z86.73 V12.54    4. Asymptomatic PVD (peripheral vascular disease) ([facility]) I73.9 443.9    5. Primary osteoarthritis involving multiple joints M15.0 715.09    6. Left lower quadrant pain R10.32 789.04 ciprofloxacin HCl (CIPRO) 500 mg tablet      metroNIDAZOLE (FLAGYL) 500 mg tablet   7. Diarrhea, unspecified type R19.7 787.91    8. Skin lesion of left lower limb L98.9 709.9 REFERRAL TO DERMATOLOGY   9. Hypercholesterolemia W98.15 736.6 METABOLIC PANEL, COMPREHENSIVE      LIPID PANEL   10. Toenail deformity L60.8 703.9 REFERRAL TO PODIATRY     Diagnoses and all orders for this visit:    1. Benign hypertension with chronic kidney disease, stage III  Blood pressure is at goal and creatinine is stable. 2. Hypothyroidism due to acquired atrophy of thyroid  Euthyroid on replacement. 3. Cerebrovascular disease, arteriosclerotic, post-stroke    4.  Asymptomatic PVD (peripheral vascular disease) ([facility])  No pulses, but no symptoms or evidence of ischemia. 5. Primary osteoarthritis involving multiple joints    6. Left lower quadrant pain  Very likely diverticulitis; will treat as such. She will let us know if not improving in 3 days. -     ciprofloxacin HCl (CIPRO) 500 mg tablet; Take 1 Tab by mouth two (2) times a day. -     metroNIDAZOLE (FLAGYL) 500 mg tablet; Take 1 Tab by mouth three (3) times daily. 7. Diarrhea, unspecified type    8. Skin lesion of left lower limb  ? SCC  -     REFERRAL TO DERMATOLOGY    9. Hypercholesterolemia  Hyperlipidemia is controlled  -     METABOLIC PANEL, COMPREHENSIVE; Future  -     LIPID PANEL; Future    10. Toenail deformity=unable to cut nails  -     REFERRAL TO PODIATRY      Follow-up Disposition:  Return in about 4 months (around 1/18/2018) for HTN, CKD, chol, thyroid Fasting lab one week prior . lab results and schedule of future lab studies reviewed with patient  reviewed diet, exercise and weight control  cardiovascular risk and specific lipid/LDL goals reviewed  I have discussed the diagnosis with the patient and the intended plan as seen in the above orders. Patient is in agreement. The patient has received an after-visit summary and questions were answered concerning future plans. I have discussed medication side effects and warnings with the patient as well.

## 2017-09-19 ENCOUNTER — TELEPHONE (OUTPATIENT)
Dept: INTERNAL MEDICINE CLINIC | Age: 82
End: 2017-09-19

## 2017-09-26 ENCOUNTER — TELEPHONE (OUTPATIENT)
Dept: INTERNAL MEDICINE CLINIC | Age: 82
End: 2017-09-26

## 2017-09-26 NOTE — TELEPHONE ENCOUNTER
Olga Gusman walked into the office. States she could not get thru to the office via phone yesterday. States pt was put on Cipro 9/18 x 2 days r/t LLQ pain. Reports slight diarrhea, green stool, and continued abdominal pain.      Please call granddaughter, Carmen Torres at [phone]

## 2017-09-26 NOTE — TELEPHONE ENCOUNTER
Inform patient's granddaughter that most antibiotics can cause diarrhea by getting rid of both the bad and good bacteria in the gut. She should buy a probiotic tablet from any drug store to help reduce the diarrhea. Dr. Yuly Armas recommends [address], [facility] Super Probiotic, or any other brand of probiotics. Have patient take the probiotics while she is on the antibiotics and for 2 weeks after finishing the antibiotics. Also, make sure she is not using Miralax if she is having diarrhea.

## 2017-10-02 ENCOUNTER — TELEPHONE (OUTPATIENT)
Dept: INTERNAL MEDICINE CLINIC | Age: 82
End: 2017-10-02

## 2017-10-02 NOTE — TELEPHONE ENCOUNTER
Try to finish antibiotic if possible. Green stool is just bile that has not been acted on by the intestinal tract. Not a cause for concern. I do not see how probiotic could cause nightmares, but she does not have to take it.

## 2017-10-02 NOTE — TELEPHONE ENCOUNTER
Andre Osullivan notified of Dr Red Mcintyre note and she stated that pt had finished both antibiotics. Andre Osullivan has some beano and will try that for the bloating an d gas.

## 2017-10-02 NOTE — TELEPHONE ENCOUNTER
Christianne Fishman ([facility]) called, pt states she has had gas, bloating and \"green stools\" since she started the antibiotic. Dr Nafisa Moya had advised her to take a probiotic but pt stated that it gave her nightmares. She has finished the cipro but still has a few more of the metronidazole. April's number [phone].

## 2017-10-10 RX ORDER — LEVOTHYROXINE SODIUM 50 UG/1
TABLET ORAL
Qty: 105 TAB | Refills: 3 | Status: SHIPPED | OUTPATIENT
Start: 2017-10-10 | End: 2018-06-20 | Stop reason: SDUPTHER

## 2017-11-20 ENCOUNTER — HOSPITAL ENCOUNTER (OUTPATIENT)
Dept: LAB | Age: 82
Discharge: HOME | End: 2017-11-20

## 2017-11-20 ENCOUNTER — OFFICE VISIT (OUTPATIENT)
Dept: DERMATOLOGY | Facility: AMBULATORY SURGERY CENTER | Age: 82
End: 2017-11-20

## 2017-11-20 VITALS
RESPIRATION RATE: 17 BRPM | SYSTOLIC BLOOD PRESSURE: 128 MMHG | HEIGHT: 67 IN | WEIGHT: 138 LBS | TEMPERATURE: 98.7 F | BODY MASS INDEX: 21.66 KG/M2 | DIASTOLIC BLOOD PRESSURE: 72 MMHG

## 2017-11-20 DIAGNOSIS — L72.0 MILIAL CYST: ICD-10-CM

## 2017-11-20 DIAGNOSIS — D48.5 NEOPLASM OF UNCERTAIN BEHAVIOR OF SKIN OF LOWER LEG: Primary | ICD-10-CM

## 2017-11-20 DIAGNOSIS — L72.9 CYST OF SKIN: ICD-10-CM

## 2017-11-20 NOTE — PROGRESS NOTES
Chief Complaint   Patient presents with    Skin Exam     Unable to assess pt. Pulse Ox. and pulse. 1. Have you been to the ER, urgent care clinic since your last visit? Hospitalized since your last visit? No    2. Have you seen or consulted any other health care providers outside of the [address] [place] [address] since your last visit? Include any pap smears or colon screening. Yes, Urgent care for boil on left inner thigh.

## 2017-11-20 NOTE — MR AVS SNAPSHOT
Visit Information Date & Time Provider Department Dept. Phone Encounter #  
 11/20/2017  1:00 PM Swapna Strong NP [place] [address] [phone] [id number] Your Appointments 1/26/2018 10:30 AM  
LAB with LAB [facility] Aviva Jeronimo [address] [facility]) Appt Note: fasting lab  
 [address] [address] [phone] [address] [facility]  
  
    
 2/2/2018 10:30 AM  
ROUTINE CARE with MD Aviva Whitmore [address] [place] [address]) Appt Note: HTN, CKD, chol, thyroid $0cp 09.18.17  
 [address] [address] [phone]  
  
   
 [address] Upcoming Health Maintenance Date Due  
 MEDICARE YEARLY EXAM 1/18/2018 GLAUCOMA SCREENING Q2Y 5/23/2019 DTaP/Tdap/Td series (2 - Td) 11/27/2025 Allergies as of 11/20/2017  Review Complete On: 11/20/2017 By: Vicky Diaz LPN Severity Noted Reaction Type Reactions Lyrica [Pregabalin]  05/17/2010    Other (comments) Couldn't function Macrobid [Nitrofurantoin Monohyd/m-cryst]  05/17/2010    Other (comments)  
 sick Neurontin [Gabapentin]  05/17/2010    Other (comments) groggy Current Immunizations  Reviewed on 9/18/2017 Name Date Pneumococcal Conjugate (PCV-13) 4/12/2016 Pneumococcal Polysaccharide (PPSV-23) 9/4/2013 TD Vaccine 9/1/2008 Tdap 11/27/2015  6:08 PM  
 ZZZ-RETIRED (DO NOT USE) Pneumococcal Vaccine (Unspecified Type) 8/1/2005 Zoster Vaccine, Live 11/12/2013 Not reviewed this visit Vitals BP Temp Resp Height(growth percentile) Weight(growth percentile) BMI  
 128/72 (BP 1 Location: Right arm, BP Patient Position: Sitting) 98.7 °F (37.1 °C) (Oral) 17 5' 7\" (1.702 m) 138 lb (62.6 kg) 21.61 kg/m2 OB Status Smoking Status Hysterectomy Never Smoker Vitals History BMI and BSA Data Body Mass Index Body Surface Area  
 21.61 kg/m 2 1.72 m 2 Preferred Pharmacy Pharmacy Name Phone [facility] [address] [facility], [facility] [address] [facility] [address] [facility] Jennifer Mack [phone] Your Updated Medication List  
  
   
This list is accurate as of: 11/20/17  1:28 PM.  Always use your most recent med list.  
  
  
  
  
 aspirin delayed-release 81 mg tablet Take 1 Tab by mouth daily. hydroCHLOROthiazide 12.5 mg capsule Commonly known as:  [place] TAKE ONE CAPSULE BY MOUTH ONCE DAILY  
  
 levothyroxine 50 mcg tablet Commonly known as:  SYNTHROID  
TAKE 2 TABLETS BY MOUTH ON SUNDAYS AND ONE TABLET THE OTHER 6 DAYS OF THE WEEK. metroNIDAZOLE 500 mg tablet Commonly known as:  FLAGYL Take 1 Tab by mouth three (3) times daily. nortriptyline 10 mg capsule Commonly known as:  PAMELOR  
TAKE ONE TO TWO CAPSULES BY MOUTH AT BEDTIME FOR SHINGLES PAIN  
  
 polyethylene glycol 17 gram/dose powder Commonly known as:  [place] Take 17 g by mouth two (2) times a day. 1 tablespoon with 8 oz of water daily  
  
 simvastatin 20 mg tablet Commonly known as:  ZOCOR  
TAKE ONE TABLET BY MOUTH NIGHTLY.  
  
 traMADol 50 mg tablet Commonly known as:  ULTRAM  
Take 1 Tab by mouth every six (6) hours as needed for Pain. Max Daily Amount: 200 mg. Indications: PAIN  
  
 TYLENOL EXTRA STRENGTH 500 mg tablet Generic drug:  acetaminophen Take  by mouth every six (6) hours as needed. Patient Instructions Self Skin Exam and Sunscreens Early detection and treatment is essential in the treatment of all forms of skin cancer. If caught early, all forms of skin cancer are curable. In addition to your regular visits, you should perform a monthly skin examination. Over time, you become familiar with what is normally found on your skin and can identify new or suspicious spots. One of the screening tools you can use to assess your skin is to follow the ABCDEs: 
 
A= Asymmetry (One half is unlike the other half) B= Border (An irregular, scalloped or poorly defined edge) C= Color (Is varied from one area to another, has shades of tan, brown/ black,       white, red or blue) D= Diameter (Spots larger than 6mm or a pencil eraser) E= Evolving (New spots or one that is changing in size, shape, or color) A follow- up interval will be customized based on your history of skin cancer or level of skin damage and risk factors. In any case, if you notice a suspicious or new spot, an appointment should be arranged between regular visits. Everyone should use sunscreen and sun-safe practices, which is especially important for those with a personal or family history of skin cancer. Suggestions for this include: 1. Use daily moisturizers containing SPF 30 or higher. 2. Wear long sleeve clothing with UPF ratings and a broad-brimmed hat. 3. Apply sunscreen with SPF 30 or higher to all sun exposed areas if you are going to be in the sun. A broad spectrum UVA/ UVB sunscreen is best.  Dont forget to REAPPLY every two hours or more often if swimming or sweating! 4. Avoid outside activities during peak sun hours, especially in the summer (10am- 2pm). 5. DO NOT use tanning beds. Using sunscreen and sun-safe practices can help reduce the likelihood of developing skin cancer or additional skin cancers in those previously diagnosed. Introducing [facility]! Geovanna Alejo introduces [employer] patient portal. Now you can access parts of your medical record, email your doctor's office, and request medication refills online. 1. In your internet browser, go to [email or web address] 2. Click on the First Time User? Click Here link in the Sign In box. You will see the New Member Sign Up page. 3. Enter your [employer] Access Code exactly as it appears below. You will not need to use this code after youve completed the sign-up process. If you do not sign up before the expiration date, you must request a new code. · [employer] Access Code: [email or web address] Expires: 12/17/2017 11:17 AM 
 
4. Enter the last four digits of your Social Security Number (xxxx) and Date of Birth (mm/dd/yyyy) as indicated and click Submit. You will be taken to the next sign-up page. 5. Create a [employer] ID. This will be your [employer] login ID and cannot be changed, so think of one that is secure and easy to remember. 6. Create a [employer] password. You can change your password at any time. 7. Enter your Password Reset Question and Answer. This can be used at a later time if you forget your password. 8. Enter your e-mail address. You will receive e-mail notification when new information is available in [address] 9. Click Sign Up. You can now view and download portions of your medical record. 10. Click the Download Summary menu link to download a portable copy of your medical information. If you have questions, please visit the Frequently Asked Questions section of the [employer] website. Remember, [employer] is NOT to be used for urgent needs. For medical emergencies, dial 911. Now available from your iPhone and Android! Please provide this summary of care documentation to your next provider. Your primary care clinician is listed as Pierre Garcia. If you have any questions after today's visit, please call [phone].

## 2017-11-20 NOTE — PROGRESS NOTES
Name: Nicole Mukherjee       Age: 80 y.o. Date: 11/20/2017    Chief Complaint:   Chief Complaint   Patient presents with    Skin Exam       Subjective:    HPI:  Ms.. Nicole Mukherjee is a 80 y.o. female who presents for the evaluation of a lesion on the right corner of her eye, 2 lesions on the forehead and one on the left lateral calf. Her granddaughter states that there is a lesion on the left calf that started back in September. This is grown and a piece has fallen off. This is tender. The patient reports growing lesions on her face-the one near her eye has been drained before she states. The lesion near the commissure of her lip that I removed last visit has not regrown the state. ROS: Consitutional: Negative  Dermatological : positive for - skin lesion changes      Social History     Social History    Marital status:      Spouse name: N/A    Number of children: N/A    Years of education: N/A     Occupational History    Not on file. Social History Main Topics    Smoking status: Never Smoker    Smokeless tobacco: Never Used    Alcohol use No    Drug use: No    Sexual activity: Not on file     Other Topics Concern    Not on file     Social History Narrative       Family History   Problem Relation Age of Onset    Cancer Father        Past Medical History:   Diagnosis Date    Chronic kidney disease     Hypercholesteremia     Hypertension     Post herpetic neuralgia     Stroke ([facility] [address]     Thyroid disease        Past Surgical History:   Procedure Laterality Date    HX APPENDECTOMY      HX HYSTERECTOMY  1947    one ovary out       Current Outpatient Prescriptions   Medication Sig Dispense Refill    levothyroxine (SYNTHROID) 50 mcg tablet TAKE 2 TABLETS BY MOUTH ON SUNDAYS AND ONE TABLET THE OTHER 6 DAYS OF THE WEEK. 105 Tab 3    metroNIDAZOLE (FLAGYL) 500 mg tablet Take 1 Tab by mouth three (3) times daily.  30 Tab 0    simvastatin (ZOCOR) 20 mg tablet TAKE ONE TABLET BY MOUTH NIGHTLY. 30 Tab 11    hydroCHLOROthiazide (MICROZIDE) 12.5 mg capsule TAKE ONE CAPSULE BY MOUTH ONCE DAILY 90 Cap 1    aspirin delayed-release 81 mg tablet Take 1 Tab by mouth daily. 90 Tab 3    nortriptyline (PAMELOR) 10 mg capsule TAKE ONE TO TWO CAPSULES BY MOUTH AT BEDTIME FOR SHINGLES PAIN 60 Cap 5    traMADol (ULTRAM) 50 mg tablet Take 1 Tab by mouth every six (6) hours as needed for Pain. Max Daily Amount: 200 mg. Indications: PAIN 30 Tab 1    polyethylene glycol (MIRALAX) 17 gram/dose powder Take 17 g by mouth two (2) times a day. 1 tablespoon with 8 oz of water daily 510 g 0    acetaminophen (TYLENOL EXTRA STRENGTH) 500 mg tablet Take  by mouth every six (6) hours as needed. Allergies   Allergen Reactions    Lyrica [Pregabalin] Other (comments)     Couldn't function    Macrobid [Nitrofurantoin Monohyd/M-Cryst] Other (comments)     sick    Neurontin [Gabapentin] Other (comments)     groggy         Objective:    Visit Vitals    /72 (BP 1 Location: Right arm, BP Patient Position: Sitting)    Temp 98.7 °F (37.1 °C) (Oral)    Resp 17    Ht 5' 7\" (1.702 m)    Wt 62.6 kg (138 lb)    BMI 21.61 kg/m2       Aubrey Gorman is a 80 y.o. female who appears well and in no distress. She is awake, alert, and oriented. There is no preauricular, submandibular, or cervical lymphadenopathy. A limited skin examination was completed including the face and left lower leg. There is a cystic structure on the right lateral canthus of the eye and a milia cyst on the right forehead and 2 on the left. No recurrence of lesion on the commissure of the lip that was previously biopsied as noted. On the left lateral calf there is a firm keratotic papule-mostly crossed, tender to palpation. This is concerning for squamous cell carcinoma versus seborrheic keratosis. This is at least 1.5 cm in diameter. Assessment/Plan:  1. Cyst of skin.    This was drained with an 11 blade and comedone extractor. 2.Milia. The diagnosis was reviewed and the patient requests removal. Risks of bruising and scabbing were reviewed as well as recurrence. After verbal permission, the area was cleansed with Alcohol and the material within each was removed with an 11 blade and comedone extractor successfully. I treated 3 cysts. Care for these areas were reviewed and the patient tolerated this well. 3.Neoplasm of Uncertain Behavior, left lateral calf. The differential diagnoses were discussed. A shave biopsy was advised to sample this lesion. The procedure was reviewed and verbal and written consent were obtained. The risks of pain, bleeding, infection, and scar were discussed. The patient is aware that this is a sample and is intended for diagnosis and not therapy of the skin lesion. I performed the procedure. The site was cleansed and anesthetized with 1% Lidocaine with Epinephrine 1:100,000. A shave biopsy was performed to sample the lesion. Drysol was used for hemostasis. The wound was bandaged and care reviewed. The specimen was sent to pathology. I will contact the patient with the results and any further treatment that may be necessary. [facility]   OFFICE PROCEDURE PROGRESS NOTE   Chart reviewed for the following:   Alvarado BELTRÁN, have reviewed the History, Physical and updated the Allergic reactions for T[place]. TIME OUT performed immediately prior to start of procedure:   Randi BELTRÁN, have performed the following reviews on Rush Siddiqui   prior to the start of the procedure:     * Patient was identified by name and date of birth   * Agreement on procedure being performed was verified   * Risks and Benefits explained to the patient   * Procedure site verified and marked as necessary   * Patient was positioned for comfort   * Consent was signed and verified     Time: 1320  Date of procedure: 11/20/2017  Procedure performed by: Marcella [place]  Royce Lebron DNP  Provider assisted by: Irma Ndiaye   Patient assisted by: self   How tolerated by patient: tolerated the procedure well with no complications   Comments: none

## 2017-11-27 ENCOUNTER — TELEPHONE (OUTPATIENT)
Dept: DERMATOLOGY | Facility: AMBULATORY SURGERY CENTER | Age: 82
End: 2017-11-27

## 2017-11-27 NOTE — PROGRESS NOTES
I spoke with her granddaughter and she is aware of the diagnosis. Appears cleared by biopsy. I will follow up but we both agreed that less is best given her age. If something recurs her granddaughter will call sooner -- will follow up in 6 months.

## 2018-01-26 ENCOUNTER — HOSPITAL ENCOUNTER (OUTPATIENT)
Dept: LAB | Age: 83
Discharge: HOME | End: 2018-01-26
Payer: MEDICARE

## 2018-01-26 ENCOUNTER — APPOINTMENT (OUTPATIENT)
Dept: INTERNAL MEDICINE CLINIC | Age: 83
End: 2018-01-26

## 2018-01-26 DIAGNOSIS — E78.00 HYPERCHOLESTEROLEMIA: ICD-10-CM

## 2018-01-26 PROCEDURE — 80053 COMPREHEN METABOLIC PANEL: CPT

## 2018-01-26 PROCEDURE — 36415 COLL VENOUS BLD VENIPUNCTURE: CPT

## 2018-01-26 PROCEDURE — 80061 LIPID PANEL: CPT

## 2018-01-27 LAB
ALBUMIN SERPL-MCNC: 3.8 G/DL (ref 3.2–4.6)
ALBUMIN/GLOB SERPL: 1.5 {RATIO} (ref 1.2–2.2)
ALP SERPL-CCNC: 93 IU/L (ref 39–117)
ALT SERPL-CCNC: 15 IU/L (ref 0–32)
AST SERPL-CCNC: 24 IU/L (ref 0–40)
BILIRUB SERPL-MCNC: 0.3 MG/DL (ref 0–1.2)
BUN SERPL-MCNC: 25 MG/DL (ref 10–36)
BUN/CREAT SERPL: 19 (ref 12–28)
CALCIUM SERPL-MCNC: 9.5 MG/DL (ref 8.7–10.3)
CHLORIDE SERPL-SCNC: 102 MMOL/L (ref 96–106)
CHOLEST SERPL-MCNC: 153 MG/DL (ref 100–199)
CO2 SERPL-SCNC: 28 MMOL/L (ref 18–29)
CREAT SERPL-MCNC: 1.3 MG/DL (ref 0.57–1)
GFR SERPLBLD CREATININE-BSD FMLA CKD-EPI: 33 ML/MIN/1.73
GFR SERPLBLD CREATININE-BSD FMLA CKD-EPI: 38 ML/MIN/1.73
GLOBULIN SER CALC-MCNC: 2.6 G/DL (ref 1.5–4.5)
GLUCOSE SERPL-MCNC: 96 MG/DL (ref 65–99)
HDLC SERPL-MCNC: 89 MG/DL
INTERPRETATION, 910389: NORMAL
INTERPRETATION: NORMAL
LDLC SERPL CALC-MCNC: 56 MG/DL (ref 0–99)
PDF IMAGE, 910387: NORMAL
POTASSIUM SERPL-SCNC: 4.6 MMOL/L (ref 3.5–5.2)
PROT SERPL-MCNC: 6.4 G/DL (ref 6–8.5)
SODIUM SERPL-SCNC: 146 MMOL/L (ref 134–144)
TRIGL SERPL-MCNC: 41 MG/DL (ref 0–149)
VLDLC SERPL CALC-MCNC: 8 MG/DL (ref 5–40)

## 2018-02-02 ENCOUNTER — OFFICE VISIT (OUTPATIENT)
Dept: INTERNAL MEDICINE CLINIC | Age: 83
End: 2018-02-02

## 2018-02-02 VITALS
OXYGEN SATURATION: 97 % | WEIGHT: 128.5 LBS | DIASTOLIC BLOOD PRESSURE: 67 MMHG | SYSTOLIC BLOOD PRESSURE: 138 MMHG | BODY MASS INDEX: 20.17 KG/M2 | HEIGHT: 67 IN | HEART RATE: 82 BPM | RESPIRATION RATE: 20 BRPM | TEMPERATURE: 96.4 F

## 2018-02-02 DIAGNOSIS — I67.2 CEREBROVASCULAR DISEASE, ARTERIOSCLEROTIC, POST-STROKE: ICD-10-CM

## 2018-02-02 DIAGNOSIS — E03.4 HYPOTHYROIDISM DUE TO ACQUIRED ATROPHY OF THYROID: ICD-10-CM

## 2018-02-02 DIAGNOSIS — Z86.73 CEREBROVASCULAR DISEASE, ARTERIOSCLEROTIC, POST-STROKE: ICD-10-CM

## 2018-02-02 DIAGNOSIS — Z00.00 MEDICARE ANNUAL WELLNESS VISIT, SUBSEQUENT: Primary | ICD-10-CM

## 2018-02-02 DIAGNOSIS — E78.00 HYPERCHOLESTEROLEMIA: ICD-10-CM

## 2018-02-02 DIAGNOSIS — I73.9 ASYMPTOMATIC PVD (PERIPHERAL VASCULAR DISEASE) (HCC): ICD-10-CM

## 2018-02-02 DIAGNOSIS — N18.30 BENIGN HYPERTENSION WITH CHRONIC KIDNEY DISEASE, STAGE III (HCC): ICD-10-CM

## 2018-02-02 DIAGNOSIS — I12.9 BENIGN HYPERTENSION WITH CHRONIC KIDNEY DISEASE, STAGE III (HCC): ICD-10-CM

## 2018-02-02 DIAGNOSIS — Z71.89 ADVANCE DIRECTIVE DISCUSSED WITH PATIENT: ICD-10-CM

## 2018-02-02 NOTE — PROGRESS NOTES
This is a Subsequent Medicare Annual Wellness Exam (AWV) (Performed 12 months after IPPE or effective date of Medicare Part B enrollment)    I have reviewed the patient's medical history in detail and updated the computerized patient record. History     Past Medical History:   Diagnosis Date    Chronic kidney disease     Hypercholesteremia     Hypertension     Post herpetic neuralgia     St[facility] ([facility] [address]     Thyroid disease       Past Surgical History:   Procedure Laterality Date    HX APPENDECTOMY      HX HYSTERECTOMY  1947    one ovary out     Current Outpatient Prescriptions   Medication Sig Dispense Refill    levothyroxine (SYNTHROID) 50 mcg tablet TAKE 2 TABLETS BY MOUTH ON SUNDAYS AND ONE TABLET THE OTHER 6 DAYS OF THE WEEK. 105 Tab 3    simvastatin (ZOCOR) 20 mg tablet TAKE ONE TABLET BY MOUTH NIGHTLY. 30 Tab 11    hydroCHLOROthiazide (MICROZIDE) 12.5 mg capsule TAKE ONE CAPSULE BY MOUTH ONCE DAILY 90 Cap 1    aspirin delayed-release 81 mg tablet Take 1 Tab by mouth daily. 90 Tab 3    nortriptyline (PAMELOR) 10 mg capsule TAKE ONE TO TWO CAPSULES BY MOUTH AT BEDTIME FOR SHINGLES PAIN 60 Cap 5    traMADol (ULTRAM) 50 mg tablet Take 1 Tab by mouth every six (6) hours as needed for Pain. Max Daily Amount: 200 mg. Indications: PAIN 30 Tab 1    polyethylene glycol (MIRALAX) 17 gram/dose powder Take 17 g by mouth two (2) times a day. 1 tablespoon with 8 oz of water daily 510 g 0    acetaminophen (TYLENOL EXTRA STRENGTH) 500 mg tablet Take  by mouth every six (6) hours as needed.        Allergies   Allergen Reactions    Lyrica [Pregabalin] Other (comments)     Couldn't function    Macrobid [Nitrofurantoin Monohyd/M-Cryst] Other (comments)     sick    Neurontin [Gabapentin] Other (comments)     groggy     Family History   Problem Relation Age of Onset    Cancer Father      Social History   Substance Use Topics    Smoking status: Never Smoker    Smokeless tobacco: Never Used    Alcohol use No Patient Active Problem List   Diagnosis Code    Postherpetic neuralgia B02.29    Cerebrovascular disease, arteriosclerotic, post-stroke I67.2, Z86.73    Asymptomatic PVD (peripheral vascular disease) ([facility]) I73.9    CKD (chronic kidney disease) stage 3, GFR 30-59 ml/min N18.3    Hypercholesterolemia E78.00    Palpitations R00.2    Benign hypertension with chronic kidney disease, stage III I12.9, N18.3    Advance directive discussed with patient Z70.80    Hypothyroidism due to acquired atrophy of thyroid E03.4       Depression Risk Factor Screening:     PHQ over the last two weeks 5/25/2017   Little interest or pleasure in doing things Not at all   Feeling down, depressed or hopeless Not at all   Total Score PHQ 2 0     Alcohol Risk Factor Screening: You do not drink alcohol or very rarely. Functional Ability and Level of Safety:   Hearing Loss  The patient wears hearing aids. She days they do not work well. Cannot see well enough to insert batteries. 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 It has stabilized in right eye. Activities of Daily Living  The home contains: handrails and grab bars  Patient needs help with:  transportation, shopping, laundry, managing medications, managing money and walking (cane). Fall Risk  Fall Risk Assessment, last 12 mths 5/25/2017   Able to walk? Yes   Fall in past 12 months? No   Fall with injury? -   Number of falls in past 12 months -   Fall Risk Score -       Abuse Screen  Patient is not abused    Cognitive Screening   Evaluation of Cognitive Function:  Has your family/caregiver stated any concerns about your memory: no  Normal    Patient Care Team   Patient Care Team:  Rigo Singh MD as PCP - General (Internal Medicine)  Kathy Hamlin RN as Nurse Navigator    Assessment/Plan   Education and counseling provided:  End-of-Life planning (with patient's consent) She has a document at home.          Health Maintenance Due Topic Date Due    MEDICARE YEARLY EXAM  01/18/2018

## 2018-02-02 NOTE — PROGRESS NOTES
Luis Martin  Identified pt with two pt identifiers(name and ). Chief Complaint   Patient presents with    Blood Pressure Check    Cholesterol Problem    Chronic Kidney Disease    Thyroid Problem       Reviewed record In preparation for visit and have obtained necessary documentation. Will bring a copy of advanced directive / living will. 1. Have you been to the ER, urgent care clinic or hospitalized since your last visit? No     2. Have you seen or consulted any other health care providers outside of the [address] [place] [address] since your last visit? Include any pap smears or colon screening. Dermatology     Vitals reviewed with provider. Health Maintenance reviewed:     Health Maintenance Due   Topic    MEDICARE YEARLY EXAM         Wt Readings from Last 3 Encounters:   17 138 lb (62.6 kg)   17 132 lb (59.9 kg)   17 133 lb (60.3 kg)      Temp Readings from Last 3 Encounters:   17 98.7 °F (37.1 °C) (Oral)   17 97 °F (36.1 °C) (Oral)   17 96.5 °F (35.8 °C) (Oral)      BP Readings from Last 3 Encounters:   17 128/72   17 122/64   17 132/66      Pulse Readings from Last 3 Encounters:   17 70   17 87   17 87      There were no vitals filed for this visit.        Learning Assessment:   :     Learning Assessment 2017   PRIMARY LEARNER Patient Patient   HIGHEST LEVEL OF EDUCATION - PRIMARY LEARNER  DID NOT GRADUATE HIGH SCHOOL DID NOT GRADUATE HIGH SCHOOL   BARRIERS PRIMARY LEARNER HEARING HEARING   CO-LEARNER CAREGIVER Yes Yes   CO-LEARNER NAME Maninderdaniel Kamran William/Brenden Tonie [facility] Brayan Thomas/Jamin Vallecillo 32 LEVEL OF EDUCATION 4 YEARS OF COLLEGE SOME COLLEGE   BARRIERS CO-LEARNER - NONE   PRIMARY LANGUAGE ENGLISH ENGLISH   PRIMARY LANGUAGE CO-LEARNER - ENGLISH    NEED - No   LEARNER PREFERENCE PRIMARY LISTENING LISTENING   LEARNER PREFERENCE CO-LEARNER - OTHER (COMMENT)   LEARNING SPECIAL TOPICS - NA   ANSWERED BY Patient patient   RELATIONSHIP SELF SELF        Depression Screening:   :     PHQ over the last two weeks 5/25/2017   Little interest or pleasure in doing things Not at all   Feeling down, depressed or hopeless Not at all   Total Score PHQ 2 0        Fall Risk Assessment:   :     Fall Risk Assessment, last 12 mths 5/25/2017   Able to walk? Yes   Fall in past 12 months? No   Fall with injury? -   Number of falls in past 12 months -   Fall Risk Score -        Abuse Screening:   :     Abuse Screening Questionnaire 8/4/2017 8/30/2016 6/18/2015 6/18/2014   Do you ever feel afraid of your partner? N N N N   Are you in a relationship with someone who physically or mentally threatens you? N N N N   Is it safe for you to go home?  Y Y Y Y        ADL Screening:   :     ADL Assessment 12/1/2014   Feeding yourself No Help Needed   Getting from bed to chair No Help Needed   Getting dressed No Help Needed   Bathing or showering No Help Needed   Walk across the room (includes cane/walker) No Help Needed   Using the telphone No Help Needed   Taking your medications Help Needed   Preparing meals No Help Needed   Managing money (expenses/bills) No Help Needed   Moderately strenuous housework (laundry) No Help Needed   Shopping for personal items (toiletries/medicines) Help Needed   Shopping for groceries Help Needed   Driving Help Needed   Climbing a flight of stairs No Help Needed   Getting to places beyond walking distances No Help Needed

## 2018-02-02 NOTE — MR AVS SNAPSHOT
Gaby Bel 
 
 
 [address] [id number] [phone] Patient: Jordyn Perez MRN: [id number] OSY:[birth year] Visit Information Date & Time Provider Department Dept. Phone Encounter #  
 2/2/2018 10:30 AM Kenji Dc MD Amrit Maldonado [phone] [id number] Follow-up Instructions Return in about 3 months (around 5/2/2018) for HTN, CKD, Thyroid  NON-fasting lab one week prior . Upcoming Health Maintenance Date Due  
 MEDICARE YEARLY EXAM 1/18/2018 GLAUCOMA SCREENING Q2Y 5/23/2019 DTaP/Tdap/Td series (2 - Td) 11/27/2025 Allergies as of 2/2/2018  Review Complete On: 2/2/2018 By: Kenji Dc MD  
  
 Severity Noted Reaction Type Reactions Lyrica [Pregabalin]  05/17/2010    Other (comments) Couldn't function Macrobid [Nitrofurantoin Monohyd/m-cryst]  05/17/2010    Other (comments)  
 sick Neurontin [Gabapentin]  05/17/2010    Other (comments) kaitlyn Current Immunizations  Reviewed on 2/2/2018 Name Date Pneumococcal Conjugate (PCV-13) 4/12/2016 Pneumococcal Polysaccharide (PPSV-23) 9/4/2013 TD Vaccine 9/1/2008 Tdap 11/27/2015  6:08 PM  
 ZZZ-RETIRED (DO NOT USE) Pneumococcal Vaccine (Unspecified Type) 8/1/2005 Zoster Vaccine, Live 11/12/2013 Reviewed by Moose Blount LPN on 3/3/6309 at 66:78 AM  
You Were Diagnosed With   
  
 Codes Comments Medicare annual wellness visit, subsequent    -  Primary ICD-10-CM: Z00.00 ICD-9-CM: V70.0 Advance directive discussed with patient     ICD-10-CM: Z71.89 ICD-9-CM: V65.49 Benign hypertension with chronic kidney disease, stage III     ICD-10-CM: I12.9, N18.3 ICD-9-CM: 403.10, 585.3 Hypercholesterolemia     ICD-10-CM: E78.00 ICD-9-CM: 272.0 Hypothyroidism due to acquired atrophy of thyroid     ICD-10-CM: E03.4 ICD-9-CM: 244.8, 246.8 Asymptomatic PVD (peripheral vascular disease) ([facility] [address]     ICD-10-CM: I73.9 ICD-9-CM: 443.9 Cerebrovascular disease, arteriosclerotic, post-stroke     ICD-10-CM: I67.2, Z86.73 
ICD-9-CM: 437.0, V12.54 Vitals BP Pulse Temp Resp Height(growth percentile) Weight(growth percentile)  
 138/67 (BP 1 Location: Left arm, BP Patient Position: Sitting) 82 96.4 °F (35.8 °C) (Oral) 20 5' 7\" (1.702 m) 128 lb 8 oz (58.3 kg) SpO2 BMI OB Status Smoking Status 97% 20.13 kg/m2 Hysterectomy Never Smoker Vitals History BMI and BSA Data Body Mass Index Body Surface Area  
 20.13 kg/m 2 1.66 m 2 Preferred Pharmacy Pharmacy Name Phone [facility] [place] [address] [place] [address] [phone] Your Updated Medication List  
  
   
This list is accurate as of: 2/2/18 11:34 AM.  Always use your most recent med list.  
  
  
  
  
 aspirin delayed-release 81 mg tablet Take 1 Tab by mouth daily. hydroCHLOROthiazide 12.5 mg capsule Commonly known as:  Deneice Sarks TAKE ONE CAPSULE BY MOUTH ONCE DAILY  
  
 levothyroxine 50 mcg tablet Commonly known as:  SYNTHROID  
TAKE 2 TABLETS BY MOUTH ON SUNDAYS AND ONE TABLET THE OTHER 6 DAYS OF THE WEEK. nortriptyline 10 mg capsule Commonly known as:  PAMELOR  
TAKE ONE TO TWO CAPSULES BY MOUTH AT BEDTIME FOR SHINGLES PAIN  
  
 polyethylene glycol 17 gram/dose powder Commonly known as:  Ernestene Score Take 17 g by mouth two (2) times a day. 1 tablespoon with 8 oz of water daily  
  
 simvastatin 20 mg tablet Commonly known as:  ZOCOR  
TAKE ONE TABLET BY MOUTH NIGHTLY.  
  
 traMADol 50 mg tablet Commonly known as:  ULTRAM  
Take 1 Tab by mouth every six (6) hours as needed for Pain. Max Daily Amount: 200 mg. Indications: PAIN  
  
 TYLENOL EXTRA STRENGTH 500 mg tablet Generic drug:  acetaminophen Take  by mouth every six (6) hours as needed. Follow-up Instructions Return in about 3 months (around 5/2/2018) for HTN, CKD, Thyroid  NON-fasting lab one week prior . To-Do List   
 05/02/2018 Lab:  METABOLIC PANEL, BASIC   
  
 05/02/2018 Lab:  TSH 3RD GENERATION Patient Instructions Use Aquaphor in nostrils to prevent drying and pain Office visit in 3 months with non-fasting lab work a week before visit. The best way to stay healthy is to live a healthy lifestyle. A healthy lifestyle includes regular exercise, eating a well-balanced diet, keeping a healthy weight and not smoking. Regular physical exams and screening tests are another important way to take care of yourself. Preventive exams provided by health care providers can find health problems early when treatment works best and can keep you from getting certain diseases or illnesses. Preventive services include exams, lab tests, screenings, shots, monitoring and information to help you take care of your own health. All people over 65 should have a pneumonia shot. Pneumonia shots are usually only needed once in a lifetime unless your doctor decides differently. In addition to your physical exam, some screening tests are recommended: 
 
All people over 65 should have a yearly flu shot. People over 65 are at medium to high risk for Hepatitis B. Three shots are needed for complete protection. Bone mass measurement (dexa scan) is recommended every two years. Diabetes Mellitus screening is recommended every year. Glaucoma is an eye disease caused by high pressure in the eye. An eye exam is recommended every year. Cardiovascular screening tests that check your cholesterol and other blood fat (lipid) levels are recommended every five years. Colorectal Cancer screening tests help to find pre-cancerous polyps (growths in the colon) so they can be removed before they turn into cancer. Tests ordered for screening depend on your personal and family history risk factors. Prostate Cancer Screening (annually up to age 76) Screening for breast cancer is recommended yearly with a Mammogram. 
 
Screening for cervical and vaginal cancer is recommended with a pelvic and Pap test every two years. However if you have had an abnormal pap in the past  three years or at high risk for cervical or vaginal cancer Medicare will cover a pap test and a pelvic exam every year. Here is a list of your current Health Maintenance items with a due date: 
Health Maintenance Due Topic Date Due  
 Annual Well Visit  01/18/2018 Well Visit, Over 72: Care Instructions Your Care Instructions Physical exams can help you stay healthy. Your doctor has checked your overall health and may have suggested ways to take good care of yourself. He or she also may have recommended tests. At home, you can help prevent illness with healthy eating, regular exercise, and other steps. Follow-up care is a key part of your treatment and safety. Be sure to make and go to all appointments, and call your doctor if you are having problems. It's also a good idea to know your test results and keep a list of the medicines you take. How can you care for yourself at home? · Reach and stay at a healthy weight. This will lower your risk for many problems, such as obesity, diabetes, heart disease, and high blood pressure. · Get at least 30 minutes of exercise on most days of the week. Walking is a good choice. You also may want to do other activities, such as running, swimming, cycling, or playing tennis or team sports. · Do not smoke. Smoking can make health problems worse. If you need help quitting, talk to your doctor about stop-smoking programs and medicines. These can increase your chances of quitting for good. · Protect your skin from too much sun.  When you're outdoors from 10 a.m. to 4 p.m., stay in the shade or cover up with clothing and a hat with a wide brim. Wear sunglasses that block UV rays. Even when it's cloudy, put broad-spectrum sunscreen (SPF 30 or higher) on any exposed skin. · See a dentist one or two times a year for checkups and to have your teeth cleaned. · Wear a seat belt in the car. · Limit alcohol to 2 drinks a day for men and 1 drink a day for women. Too much alcohol can cause health problems. Follow your doctor's advice about when to have certain tests. These tests can spot problems early. For men and women · Cholesterol. Your doctor will tell you how often to have this done based on your overall health and other things that can increase your risk for heart attack and stroke. · Blood pressure. Have your blood pressure checked during a routine doctor visit. Your doctor will tell you how often to check your blood pressure based on your age, your blood pressure results, and other factors. · Diabetes. Ask your doctor whether you should have tests for diabetes. · Vision. Experts recommend that you have yearly exams for glaucoma and other age-related eye problems. · Hearing. Tell your doctor if you notice any change in your hearing. You can have tests to find out how well you hear. · Colon cancer tests. Keep having colon cancer tests as your doctor recommends. You can have one of several types of tests. · Heart attack and stroke risk. At least every 4 to 6 years, you should have your risk for heart attack and stroke assessed. Your doctor uses factors such as your age, blood pressure, cholesterol, and whether you smoke or have diabetes to show what your risk for a heart attack or stroke is over the next 10 years. · Osteoporosis. Talk to your doctor about whether you should have a bone density test to find out whether you have thinning bones. Also ask your doctor about whether you should take calcium and vitamin D supplements. For women · Pap test and pelvic exam. You may no longer need a Pap test. Talk with your doctor about whether to stop or continue to have Pap tests. · Breast exam and mammogram. Ask how often you should have a mammogram, which is an X-ray of your breasts. A mammogram can spot breast cancer before it can be felt and when it is easiest to treat. · Thyroid disease. Talk to your doctor about whether to have your thyroid checked as part of a regular physical exam. Women have an increased chance of a thyroid problem. For men · Prostate exam. Talk to your doctor about whether you should have a blood test (called a PSA test) for prostate cancer. Experts disagree on whether men should have this test. Some experts recommend that you discuss the benefits and risks of the test with your doctor. · Abdominal aortic aneurysm. Ask your doctor whether you should have a test to check for an aneurysm. You may need a test if you ever smoked or if your parent, brother, sister, or child has had an aneurysm. When should you call for help? Watch closely for changes in your health, and be sure to contact your doctor if you have any problems or symptoms that concern you. Where can you learn more? Go to http://travis-benita.info/. Enter F460 in the search box to learn more about \"Well Visit, Over 65: Care Instructions. \" Current as of: May 12, 2017 Content Version: 11.4 © 3690-0027 Healthwise, Incorporated. Care instructions adapted under license by [employer] (which disclaims liability or warranty for this information). If you have questions about a medical condition or this instruction, always ask your healthcare professional. [place] [address] any warranty or liability for your use of this information. Introducing [facility]! [facility] introduces [employer] patient portal. Now you can access parts of your medical record, email your doctor's office, and request medication refills online.    
 
1. In your internet browser, go to [email or web address] 2. Click on the First Time User? Click Here link in the Sign In box. You will see the New Member Sign Up page. 3. Enter your [employer] Access Code exactly as it appears below. You will not need to use this code after youve completed the sign-up process. If you do not sign up before the expiration date, you must request a new code. · [employer] Access Code: [email or web address] Expires: 5/3/2018 11:34 AM 
 
4. Enter the last four digits of your Social Security Number (xxxx) and Date of Birth (mm/dd/yyyy) as indicated and click Submit. You will be taken to the next sign-up page. 5. Create a [employer] ID. This will be your [employer] login ID and cannot be changed, so think of one that is secure and easy to remember. 6. Create a [employer] password. You can change your password at any time. 7. Enter your Password Reset Question and Answer. This can be used at a later time if you forget your password. 8. Enter your e-mail address. You will receive e-mail notification when new information is available in [address] 9. Click Sign Up. You can now view and download portions of your medical record. 10. Click the Download Summary menu link to download a portable copy of your medical information. If you have questions, please visit the Frequently Asked Questions section of the [employer] website. Remember, [employer] is NOT to be used for urgent needs. For medical emergencies, dial 911. Now available from your iPhone and Android! Please provide this summary of care documentation to your next provider. Your primary care clinician is listed as Kaela Mccracken. If you have any questions after today's visit, please call [phone].

## 2018-02-02 NOTE — PATIENT INSTRUCTIONS
Use Aquaphor in nostrils to prevent drying and pain  Office visit in 3 months with non-fasting lab work a week before visit. The best way to stay healthy is to live a healthy lifestyle. A healthy lifestyle includes regular exercise, eating a well-balanced diet, keeping a healthy weight and not smoking. Regular physical exams and screening tests are another important way to take care of yourself. Preventive exams provided by health care providers can find health problems early when treatment works best and can keep you from getting certain diseases or illnesses. Preventive services include exams, lab tests, screenings, shots, monitoring and information to help you take care of your own health. All people over 65 should have a pneumonia shot. Pneumonia shots are usually only needed once in a lifetime unless your doctor decides differently. In addition to your physical exam, some screening tests are recommended:    All people over 65 should have a yearly flu shot. People over 65 are at medium to high risk for Hepatitis B. Three shots are needed for complete protection. Bone mass measurement (dexa scan) is recommended every two years. Diabetes Mellitus screening is recommended every year. Glaucoma is an eye disease caused by high pressure in the eye. An eye exam is recommended every year. Cardiovascular screening tests that check your cholesterol and other blood fat (lipid) levels are recommended every five years. Colorectal Cancer screening tests help to find pre-cancerous polyps (growths in the colon) so they can be removed before they turn into cancer. Tests ordered for screening depend on your personal and family history risk factors. Prostate Cancer Screening (annually up to age 76)    Screening for breast cancer is recommended yearly with a Mammogram.    Screening for cervical and vaginal cancer is recommended with a pelvic and Pap test every two years.  However if you have had an abnormal pap in the past  three years or at high risk for cervical or vaginal cancer Medicare will cover a pap test and a pelvic exam every year. Here is a list of your current Health Maintenance items with a due date:  Health Maintenance Due   Topic Date Due    Annual Well Visit  01/18/2018          Well Visit, Over 72: Care Instructions  Your Care Instructions    Physical exams can help you stay healthy. Your doctor has checked your overall health and may have suggested ways to take good care of yourself. He or she also may have recommended tests. At home, you can help prevent illness with healthy eating, regular exercise, and other steps. Follow-up care is a key part of your treatment and safety. Be sure to make and go to all appointments, and call your doctor if you are having problems. It's also a good idea to know your test results and keep a list of the medicines you take. How can you care for yourself at home? · Reach and stay at a healthy weight. This will lower your risk for many problems, such as obesity, diabetes, heart disease, and high blood pressure. · Get at least 30 minutes of exercise on most days of the week. Walking is a good choice. You also may want to do other activities, such as running, swimming, cycling, or playing tennis or team sports. · Do not smoke. Smoking can make health problems worse. If you need help quitting, talk to your doctor about stop-smoking programs and medicines. These can increase your chances of quitting for good. · Protect your skin from too much sun. When you're outdoors from 10 a.m. to 4 p.m., stay in the shade or cover up with clothing and a hat with a wide brim. Wear sunglasses that block UV rays. Even when it's cloudy, put broad-spectrum sunscreen (SPF 30 or higher) on any exposed skin. · See a dentist one or two times a year for checkups and to have your teeth cleaned. · Wear a seat belt in the car.   · Limit alcohol to 2 drinks a day for men and 1 drink a day for women. Too much alcohol can cause health problems. Follow your doctor's advice about when to have certain tests. These tests can spot problems early. For men and women  · Cholesterol. Your doctor will tell you how often to have this done based on your overall health and other things that can increase your risk for heart attack and stroke. · Blood pressure. Have your blood pressure checked during a routine doctor visit. Your doctor will tell you how often to check your blood pressure based on your age, your blood pressure results, and other factors. · Diabetes. Ask your doctor whether you should have tests for diabetes. · Vision. Experts recommend that you have yearly exams for glaucoma and other age-related eye problems. · Hearing. Tell your doctor if you notice any change in your hearing. You can have tests to find out how well you hear. · Colon cancer tests. Keep having colon cancer tests as your doctor recommends. You can have one of several types of tests. · Heart attack and stroke risk. At least every 4 to 6 years, you should have your risk for heart attack and stroke assessed. Your doctor uses factors such as your age, blood pressure, cholesterol, and whether you smoke or have diabetes to show what your risk for a heart attack or stroke is over the next 10 years. · Osteoporosis. Talk to your doctor about whether you should have a bone density test to find out whether you have thinning bones. Also ask your doctor about whether you should take calcium and vitamin D supplements. For women  · Pap test and pelvic exam. You may no longer need a Pap test. Talk with your doctor about whether to stop or continue to have Pap tests. · Breast exam and mammogram. Ask how often you should have a mammogram, which is an X-ray of your breasts. A mammogram can spot breast cancer before it can be felt and when it is easiest to treat. · Thyroid disease.  Talk to your doctor about whether to have your thyroid checked as part of a regular physical exam. Women have an increased chance of a thyroid problem. For men  · Prostate exam. Talk to your doctor about whether you should have a blood test (called a PSA test) for prostate cancer. Experts disagree on whether men should have this test. Some experts recommend that you discuss the benefits and risks of the test with your doctor. · Abdominal aortic aneurysm. Ask your doctor whether you should have a test to check for an aneurysm. You may need a test if you ever smoked or if your parent, brother, sister, or child has had an aneurysm. When should you call for help? Watch closely for changes in your health, and be sure to contact your doctor if you have any problems or symptoms that concern you. Where can you learn more? Go to http://travis-benita.info/. Enter Y180 in the search box to learn more about \"Well Visit, Over 65: Care Instructions. \"  Current as of: May 12, 2017  Content Version: 11.4  © 0531-4916 Healthwise, Incorporated. Care instructions adapted under license by [employer] (which disclaims liability or warranty for this information). If you have questions about a medical condition or this instruction, always ask your healthcare professional. [address] any warranty or liability for your use of this information.

## 2018-02-02 NOTE — PROGRESS NOTES
HISTORY OF PRESENT ILLNESS  Ronda Watson is a 80 y.o. female. She is accompanied by her granddaughter. HPI  She presents for follow up of hypertension, kidney disease, hyperlipidemia, peripheral vascular disease and history of prior stroke. Diet and Lifestyle: generally follows a low fat low cholesterol diet, not attempting to follow a low sodium diet, sedentary, nonsmoker  Medication compliance: compliant all of the time  Medication side effects: none  Home BP Monitorin's/80's  Cardiovascular ROS:  She complains of dyspnea on exertion. She denies palpitations, orthopnea, exertional chest pressure/discomfort, claudication, lower extremity edema, dizziness     She presents for follow up of hypothyroidism. Patient denies weight changes, heat / cold intolerance, change in energy level. Course to date has been well controlled.     Patient Active Problem List   Diagnosis Code    Postherpetic neuralgia B02.29    Cerebrovascular disease, arteriosclerotic, post-stroke I67.2, Z86.73    Asymptomatic PVD [facility]peripheral vascular disease) ([facility]) I73.9    CKD (chronic kidney disease) stage 3, GFR 30-59 ml/min N18.3    Hypercholesterolemia E78.00    Palpitations R00.2    Benign hypertension with chronic kidney disease, stage III I12.9, N18.3    Advance directive discussed with patient Z70.80    Hypothyroidism due to acquired atrophy of thyroid E03.4     Past Medical History:   Diagnosis Date    Chronic kidney disease     Hypercholesteremia     Hypertension     Post herpetic neuralgia     Stroke ([facility] [address]     Thyroid disease      Past Surgical History:   Procedure Laterality Date    HX APPENDECTOMY      HX HYSTERECTOMY      one ovary out     Social History     Social History    Marital status:      Spouse name: N/A    Number of children: N/A    Years of education: N/A     Social History Main Topics    Smoking status: Never Smoker    Smokeless tobacco: Never Used    Alcohol use No    Drug use: No    Sexual activity: Not Asked     Other Topics Concern    None     Social History Narrative     Family History   Problem Relation Age of Onset    Cancer Father      Allergies   Allergen Reactions    Lyrica [Pregabalin] Other (comments)     Couldn't function    Macrobid [Nitrofurantoin Monohyd/M-Cryst] Other (comments)     sick    Neurontin [Gabapentin] Other (comments)     groggy     Current Outpatient Prescriptions   Medication Sig Dispense Refill    levothyroxine (SYNTHROID) 50 mcg tablet TAKE 2 TABLETS BY MOUTH ON SUNDAYS AND ONE TABLET THE OTHER 6 DAYS OF THE WEEK. 105 Tab 3    simvastatin (ZOCOR) 20 mg tablet TAKE ONE TABLET BY MOUTH NIGHTLY. 30 Tab 11    hydroCHLOROthiazide (MICROZIDE) 12.5 mg capsule TAKE ONE CAPSULE BY MOUTH ONCE DAILY 90 Cap 1    aspirin delayed-release 81 mg tablet Take 1 Tab by mouth daily. 90 Tab 3    nortriptyline (PAMELOR) 10 mg capsule TAKE ONE TO TWO CAPSULES BY MOUTH AT BEDTIME FOR SHINGLES PAIN 60 Cap 5    traMADol (ULTRAM) 50 mg tablet Take 1 Tab by mouth every six (6) hours as needed for Pain. Max Daily Amount: 200 mg. Indications: PAIN 30 Tab 1    polyethylene glycol (MIRALAX) 17 gram/dose powder Take 17 g by mouth two (2) times a day. 1 tablespoon with 8 oz of water daily 510 g 0    acetaminophen (TYLENOL EXTRA STRENGTH) 500 mg tablet Take  by mouth every six (6) hours as needed. Review of Systems   Constitutional: Positive for malaise/fatigue. Negative for weight loss. Gastrointestinal: Positive for constipation. Negative for diarrhea. Musculoskeletal: Positive for joint pain. Negative for back pain. Neurological: Negative for dizziness, tingling and focal weakness.         Feels off balance for years     Visit Vitals    /67 (BP 1 Location: Left arm, BP Patient Position: Sitting)    Pulse 82    Temp 96.4 °F (35.8 °C) (Oral)    Resp 20    Ht 5' 7\" (1.702 m)    Wt 128 lb 8 oz (58.3 kg)    SpO2 97%    BMI 20.13 kg/m2     Physical Exam   Constitutional: She is oriented to person, place, and time. She appears well-developed and well-nourished. HENT:   Head: Normocephalic and atraumatic. Eyes: Conjunctivae are normal. Pupils are equal, round, and reactive to light. Neck: Neck supple. Carotid bruit is not present. No thyromegaly present. Cardiovascular: Normal rate, regular rhythm and normal heart sounds. PMI is not displaced. Exam reveals no gallop. No murmur heard. Pulses:       Dorsalis pedis pulses are 2+ on the right side, and 2+ on the left side. Posterior tibial pulses are 2+ on the right side, and 2+ on the left side. Pulmonary/Chest: Effort normal. She has no wheezes. She has no rhonchi. She has no rales. Abdominal: Soft. Normal appearance. She exhibits no abdominal bruit and no mass. There is no hepatosplenomegaly. There is no tenderness. Musculoskeletal: She exhibits no edema. Lymphadenopathy:     She has no cervical adenopathy. Right: No supraclavicular adenopathy present. Left: No supraclavicular adenopathy present. Neurological: She is alert and oriented to person, place, and time. No sensory deficit. Skin: Skin is warm, dry and intact. No rash noted. Psychiatric: She has a normal mood and affect. Her behavior is normal.   Nursing note and vitals reviewed.     Results for orders placed or performed in visit on 72/85/75   METABOLIC PANEL, COMPREHENSIVE   Result Value Ref Range    Glucose 96 65 - 99 mg/dL    BUN 25 10 - 36 mg/dL    Creatinine 1.30 (H) 0.57 - 1.00 mg/dL    GFR est non-AA 33 (L) >59 mL/min/1.73    GFR est AA 38 (L) >59 mL/min/1.73    BUN/Creatinine ratio 19 12 - 28    Sodium 146 (H) 134 - 144 mmol/L    Potassium 4.6 3.5 - 5.2 mmol/L    Chloride 102 96 - 106 mmol/L    CO2 28 18 - 29 mmol/L    Calcium 9.5 8.7 - 10.3 mg/dL    Protein, total 6.4 6.0 - 8.5 g/dL    Albumin 3.8 3.2 - 4.6 g/dL    GLOBULIN, TOTAL 2.6 1.5 - 4.5 g/dL    A-G Ratio 1.5 1.2 - 2.2    Bilirubin, total 0.3 0.0 - 1.2 mg/dL    Alk. phosphatase 93 39 - 117 IU/L    AST (SGOT) 24 0 - 40 IU/L    ALT (SGPT) 15 0 - 32 IU/L   LIPID PANEL   Result Value Ref Range    Cholesterol, total 153 100 - 199 mg/dL    Triglyceride 41 0 - 149 mg/dL    HDL Cholesterol 89 >39 mg/dL    VLDL, calculated 8 5 - 40 mg/dL    LDL, calculated 56 0 - 99 mg/dL   CVD REPORT   Result Value Ref Range    INTERPRETATION Note     PDF IMAGE Not applicable    CKD REPORT   Result Value Ref Range    Interpretation Note        ASSESSMENT and PLAN    ICD-10-CM ICD-9-CM    1. Medicare annual wellness visit, subsequent Z00.00 V70.0    2. Advance directive discussed with patient Z71.89 V65.49    3. Benign hypertension with chronic kidney disease, stage III B94.8 186.77 METABOLIC PANEL, BASIC    X01.2 585.3    4. Hypercholesterolemia E78.00 272.0    5. Hypothyroidism due to acquired atrophy of thyroid E03.4 244.8 TSH 3RD GENERATION     246.8    6. Asymptomatic PVD (peripheral vascular disease) ([facility]) I73.9 443.9    7. Cerebrovascular disease, arteriosclerotic, post-stroke I67.2 437.0     Z86.73 V12.54      Diagnoses and all orders for this visit:    1. Medicare annual wellness visit, subsequent    2. Advance directive discussed with patient    3. Benign hypertension with chronic kidney disease, stage III  Blood pressure is at goal and creatinine is stable. -     METABOLIC PANEL, BASIC; Future    4. Hypercholesterolemia  Hyperlipidemia is controlled     5. Hypothyroidism due to acquired atrophy of thyroid  Euthyroid on replacement.   -     TSH 3RD GENERATION; Future    6. Asymptomatic PVD (peripheral vascular disease) ([facility] [address]    7. Cerebrovascular disease, arteriosclerotic, post-stroke      Follow-up Disposition:  Return in about 3 months (around 5/2/2018) for HTN, CKD, Thyroid  NON-fasting lab one week prior .   lab results and schedule of future lab studies reviewed with patient  reviewed diet, exercise and weight control  I have discussed the diagnosis with the patient and the intended plan as seen in the above orders. Patient is in agreement. The patient has received an after-visit summary and questions were answered concerning future plans. I have discussed medication side effects and warnings with the patient as well.

## 2018-02-05 NOTE — ACP (ADVANCE CARE PLANNING)
With patient's permission advance care planning discussed. Primary SDM would be granddaughter Dario Soares. Secondary SDM would be grandson Leonor Portillo. She does not know what she wants regarding life prolonging treatment if death is imminent. She does not know what she wants regarding  life prolonging treatment if there is overwhelming, permanent neurologic injury. Reviewed paperwork. Given name of NN who can be contacted with any questions or help needed completing forms. Conversation took 4 minutes.

## 2018-02-06 RX ORDER — HYDROCHLOROTHIAZIDE 12.5 MG/1
CAPSULE ORAL
Qty: 90 CAP | Refills: 1 | Status: SHIPPED | OUTPATIENT
Start: 2018-02-06 | End: 2018-08-01 | Stop reason: SDUPTHER

## 2018-04-26 ENCOUNTER — HOSPITAL ENCOUNTER (OUTPATIENT)
Dept: LAB | Age: 83
Discharge: HOME | End: 2018-04-26
Payer: MEDICARE

## 2018-04-26 ENCOUNTER — APPOINTMENT (OUTPATIENT)
Dept: INTERNAL MEDICINE CLINIC | Facility: CLINIC | Age: 83
End: 2018-04-26

## 2018-04-26 DIAGNOSIS — E03.4 HYPOTHYROIDISM DUE TO ACQUIRED ATROPHY OF THYROID: ICD-10-CM

## 2018-04-26 DIAGNOSIS — N18.30 BENIGN HYPERTENSION WITH CHRONIC KIDNEY DISEASE, STAGE III (HCC): ICD-10-CM

## 2018-04-26 DIAGNOSIS — I12.9 BENIGN HYPERTENSION WITH CHRONIC KIDNEY DISEASE, STAGE III (HCC): ICD-10-CM

## 2018-04-26 PROCEDURE — 36415 COLL VENOUS BLD VENIPUNCTURE: CPT

## 2018-04-26 PROCEDURE — 80048 BASIC METABOLIC PNL TOTAL CA: CPT

## 2018-04-26 PROCEDURE — 84443 ASSAY THYROID STIM HORMONE: CPT

## 2018-04-26 NOTE — LETTER
6/27/2018 11:07 AM 
 
Ms. Jack Mann 41 [address] [address] Dear Jack Mann: 
 
Please find your most recent results below. Resulted Orders METABOLIC PANEL, BASIC Result Value Ref Range Glucose 83 65 - 99 mg/dL BUN 30 10 - 36 mg/dL Creatinine 1.61 (H) 0.57 - 1.00 mg/dL GFR est non-AA 25 (L) >59 mL/min/1.73 GFR est AA 29 (L) >59 mL/min/1.73  
 BUN/Creatinine ratio 19 12 - 28 Sodium 142 134 - 144 mmol/L Potassium 4.4 3.5 - 5.2 mmol/L Chloride 99 96 - 106 mmol/L  
 CO2 27 18 - 29 mmol/L Calcium 9.4 8.7 - 10.3 mg/dL TSH 3RD GENERATION Result Value Ref Range TSH 5.810 (H) 0.450 - 4.500 uIU/mL I hope you are all right. You missed your most recent appointment. Kidney test is stable. Thyroid looks a little under-replaced. Increase levothyroxine 50 mcg tablets to 2 tablets on Sat and Sun and 1 tablet the other 5 days. You should reschedule appointment. If at least 6 weeks from now, we can recheck thyroid blood test then. Please call me if you have any questions: [phone] Sincerely, [facility]

## 2018-04-27 LAB
BUN SERPL-MCNC: 30 MG/DL (ref 10–36)
BUN/CREAT SERPL: 19 (ref 12–28)
CALCIUM SERPL-MCNC: 9.4 MG/DL (ref 8.7–10.3)
CHLORIDE SERPL-SCNC: 99 MMOL/L (ref 96–106)
CO2 SERPL-SCNC: 27 MMOL/L (ref 18–29)
CREAT SERPL-MCNC: 1.61 MG/DL (ref 0.57–1)
GFR SERPLBLD CREATININE-BSD FMLA CKD-EPI: 25 ML/MIN/1.73
GFR SERPLBLD CREATININE-BSD FMLA CKD-EPI: 29 ML/MIN/1.73
GLUCOSE SERPL-MCNC: 83 MG/DL (ref 65–99)
POTASSIUM SERPL-SCNC: 4.4 MMOL/L (ref 3.5–5.2)
SODIUM SERPL-SCNC: 142 MMOL/L (ref 134–144)
TSH SERPL DL<=0.005 MIU/L-ACNC: 5.81 UIU/ML (ref 0.45–4.5)

## 2018-05-31 RX ORDER — NORTRIPTYLINE HYDROCHLORIDE 10 MG/1
CAPSULE ORAL
Qty: 60 CAP | Refills: 5 | Status: SHIPPED | OUTPATIENT
Start: 2018-05-31 | End: 2019-06-27 | Stop reason: SDUPTHER

## 2018-06-20 DIAGNOSIS — E03.4 HYPOTHYROIDISM DUE TO ACQUIRED ATROPHY OF THYROID: Primary | ICD-10-CM

## 2018-06-20 RX ORDER — LEVOTHYROXINE SODIUM 50 UG/1
TABLET ORAL
Qty: 105 TAB | Refills: 3
Start: 2018-06-20 | End: 2018-07-27 | Stop reason: SDUPTHER

## 2018-07-13 ENCOUNTER — OFFICE VISIT (OUTPATIENT)
Dept: INTERNAL MEDICINE CLINIC | Facility: CLINIC | Age: 83
End: 2018-07-13

## 2018-07-13 VITALS
SYSTOLIC BLOOD PRESSURE: 148 MMHG | RESPIRATION RATE: 16 BRPM | DIASTOLIC BLOOD PRESSURE: 68 MMHG | TEMPERATURE: 97.6 F | HEART RATE: 75 BPM | WEIGHT: 125.6 LBS | OXYGEN SATURATION: 96 % | HEIGHT: 67 IN | BODY MASS INDEX: 19.71 KG/M2

## 2018-07-13 DIAGNOSIS — H01.001 BLEPHARITIS OF RIGHT UPPER EYELID, UNSPECIFIED TYPE: ICD-10-CM

## 2018-07-13 DIAGNOSIS — N18.30 BENIGN HYPERTENSION WITH CHRONIC KIDNEY DISEASE, STAGE III (HCC): ICD-10-CM

## 2018-07-13 DIAGNOSIS — I12.9 BENIGN HYPERTENSION WITH CHRONIC KIDNEY DISEASE, STAGE III (HCC): ICD-10-CM

## 2018-07-13 DIAGNOSIS — E03.4 HYPOTHYROIDISM DUE TO ACQUIRED ATROPHY OF THYROID: ICD-10-CM

## 2018-07-13 DIAGNOSIS — S43.401A SPRAIN OF RIGHT SHOULDER, UNSPECIFIED SHOULDER SPRAIN TYPE, INITIAL ENCOUNTER: Primary | ICD-10-CM

## 2018-07-13 DIAGNOSIS — Z13.31 SCREENING FOR DEPRESSION: ICD-10-CM

## 2018-07-13 NOTE — PROGRESS NOTES
HISTORY OF PRESENT ILLNESS Alexander Barrera is a 80 y.o. female. She is accompanied by granddaughter and granddaughter's . HPI   She presents complaining of right arm and chest pain. She went to be baptized on June 18. She slipped on steps coming out of water and niece reached out and grabbed her by right arm and hand to try to assist her. .In the process arm was jerked. Since then she has had chest pain on right side and under arm. She has also had pain in the right shoulder and upper arm. Quality is aching. ntensity at worst is 3/10 . At first could not bear anything touching chest; she can now. Pain kept her from sleeping first 2 nights, but doing okay now. She has no difficulty moving shoulder. She has been taking Aleve and Tylenol. She is improving. She presents for follow up of hypertension with CKD, hyperlipidemia, peripheral vascular disease and history of prior stroke. Diet and Lifestyle: not attempting to follow a low fat, low cholesterol diet, not attempting to follow a low sodium diet, sedentary, nonsmoker Medication compliance: compliant all of the time Medication side effects: none Home BP Monitoring: not done Cardiovascular ROS: 
She complains of dyspnea on exertion. She denies palpitations, orthopnea, exertional chest pressure/discomfort, claudication, lower extremity edema, dizziness She presents for follow up of hypothyroidism. Symptoms include cold intolerance. Patient denies change in energy level. Course to date has been well controlled. PHQ over the last two weeks 7/13/2018 Little interest or pleasure in doing things Nearly every day Feeling down, depressed or hopeless Nearly every day Total Score PHQ 2 6 Trouble falling or staying asleep, or sleeping too much Nearly every day Feeling tired or having little energy Nearly every day Poor appetite or overeating Not at all Feeling bad about yourself - or that you are a failure or have let yourself or your family down Not at all Trouble concentrating on things such as school, work, reading or watching TV Not at all Moving or speaking so slowly that other people could have noticed; or the opposite being so fidgety that others notice Not at all Thoughts of being better off dead, or hurting yourself in some way Not at all PHQ 9 Score 12 How difficult have these problems made it for you to do your work, take care of your home and get along with others Not difficult at all Patient Active Problem List  
Diagnosis Code  Postherpetic neuralgia B02.29  
 Cerebrovascular disease, arteriosclerotic, post-stroke I67.2, Z86.73  
 Asymptomatic PVD (peripheral vascular disease) ([facility]) I73.9  CKD (chronic kidney disease) stage 3, GFR 30-59 ml/min N18.3  Hypercholesterolemia E78.00  Palpitations R00.2  Benign hypertension with chronic kidney disease, stage III I12.9, N18.3  Advance directive discussed with patient Z70.80  
 Hypothyroidism due to acquired atrophy of thyroid E03.4 Past Medical History:  
Diagnosis Date  Chronic kidney disease  Hypercholesteremia  Hypertension  Post herpetic neuralgia  Stroke ([facility] [address]  Thyroid disease Past Surgical History:  
Procedure Laterality Date  HX APPENDECTOMY  HX HYSTERECTOMY  1947  
 one ovary out Social History Social History  Marital status:  Spouse name: N/A  
 Number of children: N/A  
 Years of education: N/A Social History Main Topics  Smoking status: Never Smoker  Smokeless tobacco: Never Used  Alcohol use No  
 Drug use: No  
 Sexual activity: Not Asked Other Topics Concern  None Social History Narrative Family History Problem Relation Age of Onset  Cancer Father Allergies Allergen Reactions  Lyrica [Pregabalin] Other (comments) Couldn't function  Macrobid [Nitrofurantoin Monohyd/M-Cryst] Other (comments)  
  sick  Neurontin [Gabapentin] Other (comments) groggy Current Outpatient Prescriptions Medication Sig Dispense Refill  levothyroxine (SYNTHROID) 50 mcg tablet Take 2 tablets on Sat and Sun and 1 tablet the other 5 days. 105 Tab 3  
 nortriptyline (PAMELOR) 10 mg capsule TAKE ONE TO TWO CAPSULES BY MOUTH AT BEDTIME FOR  SHINGLES  PAIN 60 Cap 5  hydroCHLOROthiazide (MICROZIDE) 12.5 mg capsule TAKE ONE CAPSULE BY MOUTH ONCE DAILY 90 Cap 1  simvastatin (ZOCOR) 20 mg tablet TAKE ONE TABLET BY MOUTH NIGHTLY. 30 Tab 11  
 aspirin delayed-release 81 mg tablet Take 1 Tab by mouth daily. 90 Tab 3  
 traMADol (ULTRAM) 50 mg tablet Take 1 Tab by mouth every six (6) hours as needed for Pain. Max Daily Amount: 200 mg. Indications: PAIN 30 Tab 1  polyethylene glycol (MIRALAX) 17 gram/dose powder Take 17 g by mouth two (2) times a day. 1 tablespoon with 8 oz of water daily 510 g 0  
 acetaminophen (TYLENOL EXTRA STRENGTH) 500 mg tablet Take  by mouth every six (6) hours as needed. ROS Visit Vitals  /68  Pulse 75  Temp 97.6 °F (36.4 °C) (Oral)  Resp 16  
 Ht 5' 7\" (1.702 m)  Wt 125 lb 9.6 oz (57 kg)  SpO2 96%  BMI 19.67 kg/m2 Physical Exam  
Constitutional: She is oriented to person, place, and time. She appears well-developed and well-nourished. HENT:  
Head: Normocephalic and atraumatic. Eyes: Pupils are equal, round, and reactive to light. Left eye exhibits discharge. Neck: Neck supple. Cardiovascular: Normal rate, regular rhythm and normal heart sounds. Exam reveals no gallop. No murmur heard. Pulmonary/Chest: Effort normal and breath sounds normal. She has no wheezes. She has no rales. She exhibits no mass, no tenderness, no bony tenderness and no swelling. Musculoskeletal: She exhibits no edema. Right shoulder: She exhibits normal range of motion, no tenderness, no bony tenderness, no swelling, no spasm and normal strength.   
Neurological: She is alert and oriented to person, place, and time. Skin: Skin is warm, dry and intact. No rash noted. Psychiatric: She has a normal mood and affect. Her speech is normal and behavior is normal. Thought content normal.  
Nursing note and vitals reviewed. Results for orders placed or performed in visit on 07/13/18 METABOLIC PANEL, BASIC Result Value Ref Range Glucose 115 (H) 65 - 99 mg/dL BUN 38 (H) 10 - 36 mg/dL Creatinine 1.45 (H) 0.57 - 1.00 mg/dL GFR est non-AA 28 (L) >59 mL/min/1.73 GFR est AA 33 (L) >59 mL/min/1.73  
 BUN/Creatinine ratio 26 12 - 28 Sodium 142 134 - 144 mmol/L Potassium 4.7 3.5 - 5.2 mmol/L Chloride 100 96 - 106 mmol/L  
 CO2 29 20 - 29 mmol/L Calcium 9.7 8.7 - 10.3 mg/dL Lab Results Component Value Date/Time Cholesterol, total 153 01/26/2018 01:34 PM  
 HDL Cholesterol 89 01/26/2018 01:34 PM  
 LDL,Direct 55 06/18/2015 03:43 PM  
 LDL, calculated 56 01/26/2018 01:34 PM  
 VLDL, calculated 8 01/26/2018 01:34 PM  
 Triglyceride 41 01/26/2018 01:34 PM  
 CHOL/HDL Ratio 2.1 11/10/2010 09:50 AM  
 
Lab Results Component Value Date/Time TSH 5.810 (H) 04/26/2018 10:49 AM  
 
 
 
ASSESSMENT and PLAN 
  ICD-10-CM ICD-9-CM 1. Sprain of right shoulder, unspecified shoulder sprain type, initial encounter S43.401A 840.9 2. Benign hypertension with chronic kidney disease, stage III W42.5 286.04 METABOLIC PANEL, BASIC  
 F58.7 585.3 3. Hypothyroidism due to acquired atrophy of thyroid E03.4 244.8   
  246.8 4. Blepharitis of right upper eyelid, unspecified type H01.001 373.00   
5. Screening for depression Z13.89 V79.0 [id number] [employer] Diagnoses and all orders for this visit: 1. Sprain of right shoulder, unspecified shoulder sprain type, initial encounter She is improving. She is asked not take Aleve due risk of toxicity at age 80. Explained may take total of 6-12 weeks for all symptoms to resolve.   
 
2. Benign hypertension with chronic kidney disease, stage III Hypertension is adequately controlled given her age. -     METABOLIC PANEL, BASIC 3. Hypothyroidism due to acquired atrophy of thyroid Last TSH is borderline high and levothyroxine dose was increased slightly. .  
4. Blepharitis of right upper eyelid, unspecified type 5. Screening for depression--screen is positive, but she does not fully meet criteria for diagnosis of depression. She declines additional medication. Family is very involved and will let us know if she has increased symptoms. Will monitor.  
-     [address] [place] [address] [employer] Follow-up Disposition: 
Return in about 4 months (around 11/13/2018) for HTN, thyroid, Wt . reviewed diet, exercise and weight control I have discussed the diagnosis, evaluation and treatment options and the intended plan with the patient. Patient is in agreement. The patient has received an after-visit summary and questions were answered concerning future plans. I have discussed side effects and warnings of any new medications with the patient as well.

## 2018-07-13 NOTE — PATIENT INSTRUCTIONS
Do not take Aleve. May use Tylenol 325 mg 2 tablets 4 times a day (or Extra Strength 500 mg 1 tablet 4 times a day.) Treat right eye as below. Office visit in 4 months with fasting lab work a week before visit. Blepharitis: Care Instructions Your Care Instructions Blepharitis is an inflammation or infection of the eyelids. It causes dry, scaly crusts on the eyelids. It can also cause your eyes to itch, burn, and look red. This problem is more common in people who have rosacea, dandruff, skin allergies, or eczema. Home treatment can help you keep your eyes comfortable. Your doctor may also prescribe an ointment to put on your eyelids. Follow-up care is a key part of your treatment and safety. Be sure to make and go to all appointments, and call your doctor if you are having problems. It's also a good idea to know your test results and keep a list of the medicines you take. How can you care for yourself at home? · Wash your eyelids and eyebrows daily with baby shampoo. To wash your eyelids: 
¨ Place a very warm washcloth over your eyes for about a minute. This will help soften and loosen the crusts on your eyelashes. ¨ Put a few drops of baby shampoo on a warm washcloth. ¨ Gently wipe your eyelids. This helps remove any crust. It also cleans your eyelids. ¨ Rinse well with water. · Be safe with medicines. If your doctor prescribed medicine for you, use it exactly as directed. Call your doctor if you think you are having a problem with your medicine. When should you call for help? Call your doctor now or seek immediate medical care if: 
  · You have signs of an eye infection, such as: 
¨ Pus or thick discharge coming from the eye. ¨ Redness or swelling around the eye. ¨ A fever.  
 Watch closely for changes in your health, and be sure to contact your doctor if: 
  · You have vision changes.  
  · You do not get better as expected. Where can you learn more?  
Go to http://travis-benita.info/. Enter V029 in the search box to learn more about \"Blepharitis: Care Instructions. \" Current as of: December 3, 2017 Content Version: 11.7 © 8996-9202 [employer], [facility]. Care instructions adapted under license by [employer] (which disclaims liability or warranty for this information). If you have questions about a medical condition or this instruction, always ask your healthcare professional. [place] [address] any warranty or liability for your use of this information.

## 2018-07-13 NOTE — PROGRESS NOTES
Torito Kaiser  Identified pt with two pt identifiers(name and ). Chief Complaint   Patient presents with    Arm Pain     right, lisbeth grabbed her arm when she was falling on  and has not wanted to leave house since, scared of getting hurt    Back Pain     right    Shoulder Pain     right    Chest Pain       Reviewed record In preparation for visit and have obtained necessary documentation. Will bring a copy of advanced directive / living will. 1. Have you been to the ER, urgent care clinic or hospitalized since your last visit? No     2. Have you seen or consulted any other health care providers outside of the [address] [place] [address] since your last visit? Include any pap smears or colon screening. No    Vitals reviewed with provider. Health Maintenance reviewed: There are no preventive care reminders to display for this patient.        Wt Readings from Last 3 Encounters:   18 125 lb 9.6 oz (57 kg)   18 128 lb 8 oz (58.3 kg)   17 138 lb (62.6 kg)        Temp Readings from Last 3 Encounters:   18 97.6 °F (36.4 °C) (Oral)   18 96.4 °F (35.8 °C) (Oral)   17 98.7 °F (37.1 °C) (Oral)        BP Readings from Last 3 Encounters:   18 148/68   18 138/67   17 128/72        Pulse Readings from Last 3 Encounters:   18 75   18 82   17 70        Vitals:    18 1427 18 1432   BP: 162/78 148/68   Pulse: 75    Resp: 16    Temp: 97.6 °F (36.4 °C)    TempSrc: Oral    SpO2: 96%    Weight: 125 lb 9.6 oz (57 kg)    Height: 5' 7\" (1.702 m)           Learning Assessment:   :       Learning Assessment 2017   PRIMARY LEARNER Patient Patient   HIGHEST LEVEL OF EDUCATION - PRIMARY LEARNER  DID NOT GRADUATE HIGH SCHOOL DID NOT GRADUATE HIGH SCHOOL   BARRIERS PRIMARY LEARNER HEARING HEARING   CO-LEARNER CAREGIVER Yes Yes   CO-LEARNER NAME Sunnyrobsonkenny William/Bren[place] One [facility] William/Jamin - Ruth Ann 32 LEVEL OF EDUCATION 4 YEARS OF COLLEGE SOME COLLEGE   BARRIERS CO-LEARNER - NONE   PRIMARY LANGUAGE ENGLISH ENGLISH   PRIMARY LANGUAGE CO-LEARNER - ENGLISH    NEED - No   LEARNER PREFERENCE PRIMARY LISTENING LISTENING   LEARNER PREFERENCE CO-LEARNER - OTHER (COMMENT)   LEARNING SPECIAL TOPICS - NA   ANSWERED BY Patient patient   RELATIONSHIP SELF SELF        Depression Screening:   :       PHQ over the last two weeks 7/13/2018   Little interest or pleasure in doing things Nearly every day   Feeling down, depressed or hopeless Nearly every day   Total Score PHQ 2 6   Trouble falling or staying asleep, or sleeping too much Nearly every day   Feeling tired or having little energy Nearly every day   Poor appetite or overeating Not at all   Feeling bad about yourself - or that you are a failure or have let yourself or your family down Not at all   Trouble concentrating on things such as school, work, reading or watching TV Not at all   Moving or speaking so slowly that other people could have noticed; or the opposite being so fidgety that others notice Not at all   Thoughts of being better off dead, or hurting yourself in some way Not at all   PHQ 9 Score 12   How difficult have these problems made it for you to do your work, take care of your home and get along with others Not difficult at all        Fall Risk Assessment:   :       Fall Risk Assessment, last 12 mths 7/13/2018   Able to walk? Yes   Fall in past 12 months? Yes   Fall with injury? Yes   Number of falls in past 12 months 1   Fall Risk Score 2        Abuse Screening:   :       Abuse Screening Questionnaire 7/13/2018 8/4/2017 8/30/2016 6/18/2015 6/18/2014   Do you ever feel afraid of your partner? N N N N N   Are you in a relationship with someone who physically or mentally threatens you? N N N N N   Is it safe for you to go home?  Y Y Y Y Y        ADL Screening:   :       ADL Assessment 12/1/2014   Feeding yourself No Help Needed   Getting from bed to chair No Help Needed   Getting dressed No Help Needed   Bathing or showering No Help Needed   Walk across the room (includes cane/walker) No Help Needed   Using the telphone No Help Needed   Taking your medications Help Needed   Preparing meals No Help Needed   Managing money (expenses/bills) No Help Needed   Moderately strenuous housework (laundry) No Help Needed   Shopping for personal items (toiletries/medicines) Help Needed   Shopping for groceries Help Needed   Driving Help Needed   Climbing a flight of stairs No Help Needed   Getting to places beyond walking distances No Help Needed

## 2018-07-13 NOTE — MR AVS SNAPSHOT
[address] [address] [phone] Patient: Eve Lim MRN: [id number] PCA:[id number] Visit Information Date & Time Provider Department Dept. Phone Encounter #  
 7/13/2018  2:30 PM Faye Thompson MD [facility] of [address] [place], [place] [id number] Follow-up Instructions Return in about 4 months (around 11/13/2018) for HTN, thyroid, Wt . Upcoming Health Maintenance Date Due Influenza Age 5 to Adult 8/1/2018 MEDICARE YEARLY EXAM 2/3/2019 GLAUCOMA SCREENING Q2Y 5/23/2019 DTaP/Tdap/Td series (2 - Td) 11/27/2025 Allergies as of 7/13/2018  Review Complete On: 7/13/2018 By: Faye Thompson MD  
  
 Severity Noted Reaction Type Reactions Lyrica [Pregabalin]  05/17/2010    Other (comments) Couldn't function Macrobid [Nitrofurantoin Monohyd/m-cryst]  05/17/2010    Other (comments)  
 sick Neurontin [Gabapentin]  05/17/2010    Other (comments) groggy Current Immunizations  Reviewed on 7/13/2018 Name Date Pneumococcal Conjugate (PCV-13) 4/12/2016 Pneumococcal Polysaccharide (PPSV-23) 9/4/2013 TD Vaccine 9/1/2008 Tdap 11/27/2015  6:08 PM  
 ZZZ-RETIRED (DO NOT USE) Pneumococcal Vaccine (Unspecified Type) 8/1/2005 Zoster Vaccine, Live 11/12/2013 Reviewed by Alejo Morales LPN on 7/52/1912 at  2:16 PM  
You Were Diagnosed With   
  
 Codes Comments Sprain of right shoulder, unspecified shoulder sprain type, initial encounter    -  Primary ICD-10-CM: S43.401A ICD-9-CM: 099. 9 Benign hypertension with chronic kidney disease, stage III     ICD-10-CM: I12.9, N18.3 ICD-9-CM: 403.10, 585.3 Hypothyroidism due to acquired atrophy of thyroid     ICD-10-CM: E03.4 ICD-9-CM: 244.8, 246.8 Blepharitis of right upper eyelid, unspecified type     ICD-10-CM: H01.001 ICD-9-CM: 373.00 Vitals BP Pulse Temp Resp Height(growth percentile) Weight(growth percentile) 148/68 75 97.6 °F (36.4 °C) (Oral) 16 5' 7\" (1.702 m) 125 lb 9.6 oz (57 kg) SpO2 BMI OB Status Smoking Status 96% 19.67 kg/m2 Hysterectomy Never Smoker Vitals History BMI and BSA Data Body Mass Index Body Surface Area  
 19.67 kg/m 2 1.64 m 2 Preferred Pharmacy Pharmacy Name Phone [facility] [address] [address] Blade Yepez [phone] Your Updated Medication List  
  
   
This list is accurate as of 7/13/18  3:18 PM.  Always use your most recent med list.  
  
  
  
  
 aspirin delayed-release 81 mg tablet Take 1 Tab by mouth daily. hydroCHLOROthiazide 12.5 mg capsule Commonly known as:  Cindy Greg TAKE ONE CAPSULE BY MOUTH ONCE DAILY  
  
 levothyroxine 50 mcg tablet Commonly known as:  SYNTHROID Take 2 tablets on Sat and Sun and 1 tablet the other 5 days. nortriptyline 10 mg capsule Commonly known as:  PAMELOR  
TAKE ONE TO TWO CAPSULES BY MOUTH AT BEDTIME FOR  SHINGLES  PAIN  
  
 polyethylene glycol 17 gram/dose powder Commonly known as:  Ronne Laury Take 17 g by mouth two (2) times a day. 1 tablespoon with 8 oz of water daily  
  
 simvastatin 20 mg tablet Commonly known as:  ZOCOR  
TAKE ONE TABLET BY MOUTH NIGHTLY.  
  
 traMADol 50 mg tablet Commonly known as:  ULTRAM  
Take 1 Tab by mouth every six (6) hours as needed for Pain. Max Daily Amount: 200 mg. Indications: PAIN  
  
 TYLENOL EXTRA STRENGTH 500 mg tablet Generic drug:  acetaminophen Take  by mouth every six (6) hours as needed. We Performed the Following METABOLIC PANEL, BASIC [37041 CPT(R)] Follow-up Instructions Return in about 4 months (around 11/13/2018) for HTN, thyroid, Wt . Patient Instructions Do not take Aleve. May use Tylenol 325 mg 2 tablets 4 times a day (or Extra Strength 500 mg 1 tablet 4 times a day.) Treat right eye as below. Office visit in 4 months with fasting lab work a week before visit. Blepharitis: Care Instructions Your Care Instructions Blepharitis is an inflammation or infection of the eyelids. It causes dry, scaly crusts on the eyelids. It can also cause your eyes to itch, burn, and look red. This problem is more common in people who have rosacea, dandruff, skin allergies, or eczema. Home treatment can help you keep your eyes comfortable. Your doctor may also prescribe an ointment to put on your eyelids. Follow-up care is a key part of your treatment and safety. Be sure to make and go to all appointments, and call your doctor if you are having problems. It's also a good idea to know your test results and keep a list of the medicines you take. How can you care for yourself at home? · Wash your eyelids and eyebrows daily with baby shampoo. To wash your eyelids: 
¨ Place a very warm washcloth over your eyes for about a minute. This will help soften and loosen the crusts on your eyelashes. ¨ Put a few drops of baby shampoo on a warm washcloth. ¨ Gently wipe your eyelids. This helps remove any crust. It also cleans your eyelids. ¨ Rinse well with water. · Be safe with medicines. If your doctor prescribed medicine for you, use it exactly as directed. Call your doctor if you think you are having a problem with your medicine. When should you call for help? Call your doctor now or seek immediate medical care if: 
  · You have signs of an eye infection, such as: 
¨ Pus or thick discharge coming from the eye. ¨ Redness or swelling around the eye. ¨ A fever.  
 Watch closely for changes in your health, and be sure to contact your doctor if: 
  · You have vision changes.  
  · You do not get better as expected. Where can you learn more? Go to http://travis-benita.info/. Enter D778 in the search box to learn more about \"Blepharitis: Care Instructions. \" 
 Current as of: December 3, 2017 Content Version: 11.7 © 5761-7438 [employer], [employer]. Care instructions adapted under license by [employer] (which disclaims liability or warranty for this information). If you have questions about a medical condition or this instruction, always ask your healthcare professional. [address] any warranty or liability for your use of this information. Introducing [facility] [place] [facility]! [employer] D[employer] introduces [employer] patient portal. Now you can access parts of your medical record, email your doctor's office, and request medication refills online. 1. In your internet browser, go to [email or web address] 2. Click on the First Time User? Click Here link in the Sign In box. You will see the New Member Sign Up page. 3. Enter your [employer] Access Code exactly as it appears below. You will not need to use this code after youve completed the sign-up process. If you do not sign up before the expiration date, you must request a new code. · [employer] Access Code: [email or web address] Expires: 10/11/2018  3:10 PM 
 
4. Enter the last four digits of your Social Security Number (xxxx) and Date of Birth (mm/dd/yyyy) as indicated and click Submit. You will be taken to the next sign-up page. 5. Create a HardD[employer] ID. This will be your [employer] login ID and cannot be changed, so think of one that is secure and easy to remember. 6. Create a [employer] password. You can change your password at any time. 7. Enter your Password Reset Question and Answer. This can be used at a later time if you forget your password. 8. Enter your e-mail address. You will receive e-mail notification when new information is available in [address] 9. Click Sign Up. You can now view and download portions of your medical record. 10. Click the Download Summary menu link to download a portable copy of your medical information. If you have questions, please visit the Frequently Asked Questions section of the [employer] website. Remember, [employer] is NOT to be used for urgent needs. For medical emergencies, dial 911. Now available from your iPhone and Android! Please provide this summary of care documentation to your next provider. Your primary care clinician is listed as Maxine Cerna. If you have any questions after today's visit, please call [phone].

## 2018-07-14 LAB
BUN SERPL-MCNC: 38 MG/DL (ref 10–36)
BUN/CREAT SERPL: 26 (ref 12–28)
CALCIUM SERPL-MCNC: 9.7 MG/DL (ref 8.7–10.3)
CHLORIDE SERPL-SCNC: 100 MMOL/L (ref 96–106)
CO2 SERPL-SCNC: 29 MMOL/L (ref 20–29)
CREAT SERPL-MCNC: 1.45 MG/DL (ref 0.57–1)
GLUCOSE SERPL-MCNC: 115 MG/DL (ref 65–99)
POTASSIUM SERPL-SCNC: 4.7 MMOL/L (ref 3.5–5.2)
SODIUM SERPL-SCNC: 142 MMOL/L (ref 134–144)

## 2018-07-27 DIAGNOSIS — E03.4 HYPOTHYROIDISM DUE TO ACQUIRED ATROPHY OF THYROID: ICD-10-CM

## 2018-07-27 RX ORDER — LEVOTHYROXINE SODIUM 50 UG/1
TABLET ORAL
Qty: 40 TAB | Refills: 11 | Status: SHIPPED | OUTPATIENT
Start: 2018-07-27 | End: 2019-08-22 | Stop reason: SDUPTHER

## 2018-07-27 NOTE — TELEPHONE ENCOUNTER
PCP: Edmundo Simeon MD     Last appt: 7/13/2018   Future Appointments  Date Time Provider Bar Jarai   11/6/2018 11:00 AM LAB [facility] DAMIAN IBARRA   11/13/2018 10:30 AM Edmundo Simeon  [facility]        Requested Prescriptions     Pending Prescriptions Disp Refills    levothyroxine (SYNTHROID) 50 mcg tablet 40 Tab 11     Sig: Take 2 tablets on Sat and Sun and 1 tablet the other 5 days.

## 2018-08-01 RX ORDER — HYDROCHLOROTHIAZIDE 12.5 MG/1
CAPSULE ORAL
Qty: 90 CAP | Refills: 1 | Status: SHIPPED | OUTPATIENT
Start: 2018-08-01 | End: 2019-01-16 | Stop reason: SDUPTHER

## 2018-08-22 RX ORDER — SIMVASTATIN 20 MG/1
TABLET, FILM COATED ORAL
Qty: 30 TAB | Refills: 11 | Status: SHIPPED | OUTPATIENT
Start: 2018-08-22 | End: 2019-09-20 | Stop reason: SDUPTHER

## 2018-11-13 ENCOUNTER — OFFICE VISIT (OUTPATIENT)
Dept: INTERNAL MEDICINE CLINIC | Facility: CLINIC | Age: 83
End: 2018-11-13

## 2018-11-13 VITALS
DIASTOLIC BLOOD PRESSURE: 69 MMHG | OXYGEN SATURATION: 97 % | TEMPERATURE: 97.5 F | SYSTOLIC BLOOD PRESSURE: 145 MMHG | BODY MASS INDEX: 19.46 KG/M2 | HEART RATE: 75 BPM | RESPIRATION RATE: 14 BRPM | WEIGHT: 124 LBS | HEIGHT: 67 IN

## 2018-11-13 DIAGNOSIS — E03.4 HYPOTHYROIDISM DUE TO ACQUIRED ATROPHY OF THYROID: ICD-10-CM

## 2018-11-13 DIAGNOSIS — E78.00 HYPERCHOLESTEROLEMIA: ICD-10-CM

## 2018-11-13 DIAGNOSIS — I12.9 BENIGN HYPERTENSION WITH CHRONIC KIDNEY DISEASE, STAGE III (HCC): Primary | ICD-10-CM

## 2018-11-13 DIAGNOSIS — N18.30 BENIGN HYPERTENSION WITH CHRONIC KIDNEY DISEASE, STAGE III (HCC): Primary | ICD-10-CM

## 2018-11-13 DIAGNOSIS — Z86.73 CEREBROVASCULAR DISEASE, ARTERIOSCLEROTIC, POST-STROKE: ICD-10-CM

## 2018-11-13 DIAGNOSIS — I67.2 CEREBROVASCULAR DISEASE, ARTERIOSCLEROTIC, POST-STROKE: ICD-10-CM

## 2018-11-13 DIAGNOSIS — I73.9 ASYMPTOMATIC PVD (PERIPHERAL VASCULAR DISEASE) (HCC): ICD-10-CM

## 2018-11-13 NOTE — PROGRESS NOTES
Flower Price  Identified pt with two pt identifiers(name and ). Chief Complaint   Patient presents with    Blood Pressure Check    Thyroid Problem    Weight Management       Reviewed record In preparation for visit and have obtained necessary documentation. Will bring a copy of advanced directive / living will. 1. Have you been to the ER, urgent care clinic or hospitalized since your last visit? No     2. Have you seen or consulted any other health care providers outside of the [address] [place] [address] since your last visit? Include any pap smears or colon screening. Dr Hailee Boone reviewed with provider.     Health Maintenance reviewed:     Health Maintenance Due   Topic    Shingrix Vaccine Age 50> (1 of 2)          Wt Readings from Last 3 Encounters:   18 124 lb (56.2 kg)   18 125 lb 9.6 oz (57 kg)   18 128 lb 8 oz (58.3 kg)        Temp Readings from Last 3 Encounters:   18 97.5 °F (36.4 °C) (Oral)   18 97.6 °F (36.4 °C) (Oral)   18 96.4 °F (35.8 °C) (Oral)        BP Readings from Last 3 Encounters:   18 145/69   18 148/68   18 138/67        Pulse Readings from Last 3 Encounters:   18 75   18 75   18 82        Vitals:    18 1037 18 1042   BP: 149/71 145/69   Pulse: 75    Resp: 14    Temp: 97.5 °F (36.4 °C)    TempSrc: Oral    SpO2: 97%    Weight: 124 lb (56.2 kg)    Height: 5' 7\" (1.702 m)    PainSc:   2    PainLoc: Back           Learning Assessment:   :       Learning Assessment 2017   PRIMARY LEARNER Patient Patient   HIGHEST LEVEL OF EDUCATION - PRIMARY LEARNER  DID NOT GRADUATE HIGH SCHOOL DID NOT GRADUATE HIGH SCHOOL   BARRIERS PRIMARY LEARNER HEARING HEARING   CO-LEARNER CAREGIVER Yes Yes   CO-LEARNER NAME Quintin Schultz William/r[address]enden [address] Amie Kumar LANGUAGE ENGLISH ENGLISH   PRIMARY LANGUAGE CO-LEARNER - ENGLISH    NEED - No   LEARNER PREFERENCE PRIMARY LISTENING LISTENING   LEARNER PREFERENCE CO-LEARNER - OTHER (COMMENT)   LEARNING SPECIAL TOPICS - NA   ANSWERED BY Patient patient   RELATIONSHIP SELF SELF        Depression Screening:   :       PHQ over the last two weeks 7/13/2018   Little interest or pleasure in doing things Nearly every day   Feeling down, depressed, irritable, or hopeless Nearly every day   Total Score PHQ 2 6   Trouble falling or staying asleep, or sleeping too much Nearly every day   Feeling tired or having little energy Nearly every day   Poor appetite, weight loss, or overeating Not at all   Feeling bad about yourself - or that you are a failure or have let yourself or your family down Not at all   Trouble concentrating on things such as school, work, reading, or watching TV Not at all   Moving or speaking so slowly that other people could have noticed; or the opposite being so fidgety that others notice Not at all   Thoughts of being better off dead, or hurting yourself in some way Not at all   PHQ 9 Score 12   How difficult have these problems made it for you to do your work, take care of your home and get along with others Not difficult at all        Fall Risk Assessment:   :       Fall Risk Assessment, last 12 mths 7/13/2018   Able to walk? Yes   Fall in past 12 months? Yes   Fall with injury? Yes   Number of falls in past 12 months 1   Fall Risk Score 2        Abuse Screening:   :       Abuse Screening Questionnaire 7/13/2018 8/4/2017 8/30/2016 6/18/2015 6/18/2014   Do you ever feel afraid of your partner? N N N N N   Are you in a relationship with someone who physically or mentally threatens you? N N N N N   Is it safe for you to go home?  Y Y Y Y Y        ADL Screening:   :       ADL Assessment 12/1/2014   Feeding yourself No Help Needed   Getting from bed to chair No Help Needed   Getting dressed No Help Needed Bathing or showering No Help Needed   Walk across the room (includes cane/walker) No Help Needed   Using the telphone No Help Needed   Taking your medications Help Needed   Preparing meals No Help Needed   Managing money (expenses/bills) No Help Needed   Moderately strenuous housework (laundry) No Help Needed   Shopping for personal items (toiletries/medicines) Help Needed   Shopping for groceries Help Needed   Driving Help Needed   Climbing a flight of stairs No Help Needed   Getting to places beyond walking distances No Help Needed

## 2018-11-13 NOTE — LETTER
11/14/2018 10:22 AM 
 
Ms. Huy Nunez 41 [address] [address] Dear Huy Nunez: 
 
Please find your most recent results below. Resulted Orders TSH 3RD GENERATION Result Value Ref Range TSH 2.810 0.450 - 4.500 uIU/mL METABOLIC PANEL, BASIC Result Value Ref Range Glucose 103 (H) 65 - 99 mg/dL BUN 38 (H) 10 - 36 mg/dL Creatinine 1.51 (H) 0.57 - 1.00 mg/dL GFR est non-AA 27 (L) >59 mL/min/1.73 GFR est AA 31 (L) >59 mL/min/1.73  
 BUN/Creatinine ratio 25 12 - 28 Sodium 144 134 - 144 mmol/L Potassium 5.2 3.5 - 5.2 mmol/L Chloride 101 96 - 106 mmol/L  
 CO2 27 20 - 29 mmol/L Calcium 9.5 8.7 - 10.3 mg/dL Thyroid is normal and kidney test is stable. Please call me if you have any questions: [phone] Sincerely, 
 
 
Silvestre Berman MD

## 2018-11-13 NOTE — PATIENT INSTRUCTIONS
Office visit in 3 months            Hypothyroidism: Care Instructions  Your Care Instructions    You have hypothyroidism, which means that your body is not making enough thyroid hormone. This hormone helps your body use energy. If your thyroid level is low, you may feel tired, be constipated, have an increase in your blood pressure, or have dry skin or memory problems. You may also get cold easily, even when it is warm. Women with low thyroid levels may have heavy menstrual periods. A blood test to find your thyroid-stimulating hormone (TSH) level is used to check for hypothyroidism. A high TSH level may mean that you have low thyroid. When your body is not making enough thyroid hormone, TSH levels rise in an effort to make the body produce more. The treatment for hypothyroidism is to take thyroid hormone pills. You should start to feel better in 1 to 2 weeks. But it can take several months to see changes in the TSH level. You will need regular visits with your doctor to make sure you have the right dose of medicine. Most people need treatment for the rest of their lives. You will need to see your doctor regularly to have blood tests and to make sure you are doing well. Follow-up care is a key part of your treatment and safety. Be sure to make and go to all appointments, and call your doctor if you are having problems. It's also a good idea to know your test results and keep a list of the medicines you take. How can you care for yourself at home? · Take your thyroid hormone medicine exactly as prescribed. Call your doctor if you think you are having a problem with your medicine. Most people do not have side effects if they take the right amount of medicine regularly. ? Take the medicine 30 minutes before breakfast, and do not take it with calcium, vitamins, or iron. ? Do not take extra doses of your thyroid medicine. It will not help you get better any faster, and it may cause side effects.   ? If you forget to take a dose, do NOT take a double dose of medicine. Take your usual dose the next day. · Tell your doctor about all prescription, herbal, or over-the-counter products you take. · Take care of yourself. Eat a healthy diet, get enough sleep, and get regular exercise. When should you call for help? Call 911 anytime you think you may need emergency care. For example, call if:    · You passed out (lost consciousness).     · You have severe trouble breathing.     · You have a very slow heartbeat (less than 60 beats a minute).     · You have a low body temperature (95°F or below).    Call your doctor now or seek immediate medical care if:    · You feel tired, sluggish, or weak.     · You have trouble remembering things or concentrating.     · You do not begin to feel better 2 weeks after starting your medicine.    Watch closely for changes in your health, and be sure to contact your doctor if you have any problems. Where can you learn more? Go to http://travis-benita.info/. Enter V389 in the search box to learn more about \"Hypothyroidism: Care Instructions. \"  Current as of: March 15, 2018  Content Version: 11.8  © 8715-3579 [employer], [employer]. Care instructions adapted under license by [employer] (which disclaims liability or warranty for this information). If you have questions about a medical condition or this instruction, always ask your healthcare professional. [address] [address] any warranty or liability for your use of this information.

## 2018-11-13 NOTE — PROGRESS NOTES
HISTORY OF PRESENT ILLNESS  Quan Smith is a 80 y.o. female. She is accompanied by her granddaughter  HPI  She presents for follow up of hypertension with CKD, hyperlipidemia, peripheral vascular disease and history of prior stroke. She is underweight . Diet and Lifestyle: not attempting to follow a low fat, low cholesterol diet, not attempting to follow a low sodium diet, sedentary, nonsmoker  Medication compliance: compliant all of the time  Medication side effects: none  Home BP Monitoring: not done  Cardiovascular ROS:  She complains of dyspnea on exertion. She denies palpitations, orthopnea, exertional chest pressure/discomfort, claudication, lower extremity edema, dizziness     She presents for follow up of hypothyroidism. Symptoms include cold intolerance, change in energy level. Patient denies weight changes. Complains of pain in right upper back for a week or so.  It is improving with warm packs and tylenol     Patient Active Problem List   Diagnosis Code    Postherpetic neuralgia B02.29    Cerebrovascular disease, arteriosclerotic, post-stroke I67.2, Z86.73    Asymptomatic PVD (peripheral vascular disease) ([facility]) I73.9    CKD (chronic kidney disease) stage 3, GFR 30-59 ml/min ([facility]) N18.3    Hypercholesterolemia E78.00    Palpitations R00.2    Benign hypertension with chronic kidney disease, stage III ([facility] [address] I12.9, N18.3    Advance directive discussed with patient Z70.80    Hypothyroidism due to acquired atrophy of thyroid E03.4     Past Medical History:   Diagnosis Date    Chronic kidney disease     Hypercholesteremia     Hypertension     Post herpetic neuralgia     Stroke ([facility] [address]     Thyroid disease      Past Surgical History:   Procedure Laterality Date    HX APPENDECTOMY      HX HYSTERECTOMY  1947    one ovary out     Social History     Socioeconomic History    Marital status:      Spouse name: Not on file    Number of children: Not on file    Years of education: Not on file    Highest education level: Not on file   Social Needs    Financial resource strain: Not on file    Food insecurity - worry: Not on file    Food insecurity - inability: Not on file    Transportation needs - medical: Not on file   [email or web address] needs - non-medical: Not on file   Occupational History    Not on file   Tobacco Use    Smoking status: Never Smoker    Smokeless tobacco: Never Used   Substance and Sexual Activity    Alcohol use: No    Drug use: No    Sexual activity: Not on file   Other Topics Concern    Not on file   Social History Narrative    Not on file     Family History   Problem Relation Age of Onset    Cancer Father      Allergies   Allergen Reactions    Lyrica [Pregabalin] Other (comments)     Couldn't function    Macrobid [Nitrofurantoin Monohyd/M-Cryst] Other (comments)     sick    Neurontin [Gabapentin] Other (comments)     groggy     Current Outpatient Medications   Medication Sig Dispense Refill    simvastatin (ZOCOR) 20 mg tablet TAKE ONE TABLET BY MOUTH NIGHTLY 30 Tab 11    hydroCHLOROthiazide (MICROZIDE) 12.5 mg capsule TAKE ONE CAPSULE BY MOUTH ONCE DAILY 90 Cap 1    levothyroxine (SYNTHROID) 50 mcg tablet Take 2 tablets on Sat and Sun and 1 tablet the other 5 days. 40 Tab 11    nortriptyline (PAMELOR) 10 mg capsule TAKE ONE TO TWO CAPSULES BY MOUTH AT BEDTIME FOR  SHINGLES  PAIN 60 Cap 5    aspirin delayed-release 81 mg tablet Take 1 Tab by mouth daily. 90 Tab 3    traMADol (ULTRAM) 50 mg tablet Take 1 Tab by mouth every six (6) hours as needed for Pain. Max Daily Amount: 200 mg. Indications: PAIN 30 Tab 1    polyethylene glycol (MIRALAX) 17 gram/dose powder Take 17 g by mouth two (2) times a day. 1 tablespoon with 8 oz of water daily 510 g 0    acetaminophen (TYLENOL EXTRA STRENGTH) 500 mg tablet Take  by mouth every six (6) hours as needed. Review of Systems   Constitutional: Negative for malaise/fatigue and weight loss.    Gastrointestinal: Negative for constipation, diarrhea and heartburn. Musculoskeletal: Positive for back pain. Negative for joint pain. Neurological: Negative for dizziness, tingling and focal weakness. Visit Vitals  /69 (BP 1 Location: Left arm, BP Patient Position: Sitting)   Pulse 75   Temp 97.5 °F (36.4 °C) (Oral)   Resp 14   Ht 5' 7\" (1.702 m)   Wt 124 lb (56.2 kg)   SpO2 97%   BMI 19.42 kg/m²     Physical Exam   Constitutional: She is oriented to person, place, and time. She appears well-developed and well-nourished. HENT:   Head: Normocephalic and atraumatic. Eyes: Conjunctivae are normal. Pupils are equal, round, and reactive to light. Neck: Neck supple. Carotid bruit is not present. No thyromegaly present. Cardiovascular: Normal rate and regular rhythm. PMI is not displaced. Exam reveals no gallop. No murmur heard. Pulses:       Dorsalis pedis pulses are 0 on the right side, and 0 on the left side. Posterior tibial pulses are 0 on the right side, and 0 on the left side. Pulmonary/Chest: Effort normal. She has no wheezes. She has no rhonchi. She has no rales. Abdominal: Soft. Normal appearance. She exhibits no abdominal bruit and no mass. There is no hepatosplenomegaly. There is no tenderness. Musculoskeletal: She exhibits no edema. Lymphadenopathy:     She has no cervical adenopathy. Right: No supraclavicular adenopathy present. Left: No supraclavicular adenopathy present. Neurological: She is alert and oriented to person, place, and time. No sensory deficit. Skin: Skin is warm, dry and intact. No rash noted. Psychiatric: She has a normal mood and affect. Her behavior is normal.   Nursing note and vitals reviewed.     Lab Results   Component Value Date/Time    Sodium 142 07/13/2018 03:26 PM    Potassium 4.7 07/13/2018 03:26 PM    Chloride 100 07/13/2018 03:26 PM    CO2 29 07/13/2018 03:26 PM    Anion gap 7 10/28/2016 03:34 AM    Glucose 115 (H) 07/13/2018 03:26 PM BUN 38 (H) 07/13/2018 03:26 PM    Creatinine 1.45 (H) 07/13/2018 03:26 PM    BUN/Creatinine ratio 26 07/13/2018 03:26 PM    GFR est AA 33 (L) 07/13/2018 03:26 PM    GFR est non-AA 28 (L) 07/13/2018 03:26 PM    Calcium 9.7 07/13/2018 03:26 PM     Lab Results   Component Value Date/Time    TSH 5.810 (H) 04/26/2018 10:49 AM    T4, Free 1.21 08/23/2016 12:16 PM     Lab Results   Component Value Date/Time    Cholesterol, total 153 01/26/2018 01:34 PM    HDL Cholesterol 89 01/26/2018 01:34 PM    LDL,Direct 55 06/18/2015 03:43 PM    LDL, calculated 56 01/26/2018 01:34 PM    VLDL, calculated 8 01/26/2018 01:34 PM    Triglyceride 41 01/26/2018 01:34 PM    CHOL/HDL Ratio 2.1 11/10/2010 09:50 AM       ASSESSMENT and PLAN    ICD-10-CM ICD-9-CM    1. Benign hypertension with chronic kidney disease, stage III (HCC) H52.8 737.09 METABOLIC PANEL, BASIC    N34.1 585.3    2. Cerebrovascular disease, arteriosclerotic, post-stroke I67.2 437.0     Z86.73 V12.54    3. Asymptomatic PVD (peripheral vascular disease) (HCC) I73.9 443.9    4. Hypercholesterolemia E78.00 272.0    5. Hypothyroidism due to acquired atrophy of thyroid E03.4 244.8 TSH 3RD GENERATION     246.8      Diagnoses and all orders for this visit:    1. Benign hypertension with chronic kidney disease, stage III (HCC)  Blood pressure is at goal and creatinine is stable. -     METABOLIC PANEL, BASIC    2. Cerebrovascular disease, arteriosclerotic, post-stroke  Stable taking aspirin and statin. 3. Asymptomatic PVD (peripheral vascular disease) (HCC)  Stable taking aspirin and statin. 4. Hypercholesterolemia  Hyperlipidemia is controlled    5. Hypothyroidism due to acquired atrophy of thyroid  Euthyroid on replacement.   -     TSH 3RD GENERATION      Follow-up Disposition:  Return in about 4 months (around 3/13/2019) for HTN, CVS, thyroid .   lab results and schedule of future lab studies reviewed with patient  reviewed diet, exercise and weight control  cardiovascular risk and specific lipid/LDL goals reviewed  I have discussed the diagnosis, evaluation and treatment options and the intended plan with the patient. Patient understands and is in agreement. The patient has received an after-visit summary and questions were answered concerning future plans. I have discussed side effects and warnings of any new medications with the patient as well.

## 2018-11-14 LAB
BUN SERPL-MCNC: 38 MG/DL (ref 10–36)
BUN/CREAT SERPL: 25 (ref 12–28)
CALCIUM SERPL-MCNC: 9.5 MG/DL (ref 8.7–10.3)
CHLORIDE SERPL-SCNC: 101 MMOL/L (ref 96–106)
CO2 SERPL-SCNC: 27 MMOL/L (ref 20–29)
CREAT SERPL-MCNC: 1.51 MG/DL (ref 0.57–1)
GLUCOSE SERPL-MCNC: 103 MG/DL (ref 65–99)
POTASSIUM SERPL-SCNC: 5.2 MMOL/L (ref 3.5–5.2)
SODIUM SERPL-SCNC: 144 MMOL/L (ref 134–144)
TSH SERPL DL<=0.005 MIU/L-ACNC: 2.81 UIU/ML (ref 0.45–4.5)

## 2019-01-07 NOTE — PATIENT INSTRUCTIONS
May use Ocean Spray or other saline nose spray. Do not put other things in your nose. Office visit in 4 months with fasting lab work a week before visit. Kidney Disease and High Blood Pressure: Care Instructions  Your Care Instructions  Long-term (chronic) kidney disease happens when the kidneys cannot remove waste and keep your body's fluids and chemicals in balance. Usually, the kidneys remove waste from the blood through the urine. When the kidneys are not working well, waste can build up so much that it poisons the body. Kidney disease can make you very tired. It also can cause swelling, or edema, in your legs or other areas of your body. High blood pressure is one of the major causes of chronic kidney disease. And kidney disease can also cause high blood pressure. No matter which came first, having high blood pressure damages the tiny blood vessels in the kidneys. If you have high blood pressure, it is important to lower it. There are many things you can do to lower your blood pressure, which may help slow or stop the damage to your kidneys. Follow-up care is a key part of your treatment and safety. Be sure to make and go to all appointments, and call your doctor if you are having problems. It's also a good idea to know your test results and keep a list of the medicines you take. How can you care for yourself at home? · Be safe with medicines. Take your medicines exactly as prescribed. Call your doctor if you have any problems with your medicine. You will probably need more than one medicine to lower your blood pressure. You will get more details on the specific medicines your doctor prescribes. · Work with your doctor and a dietitian to plan meals that have the right amount of nutrients for you. You will probably have to limit salt, fluids, and protein. · Stay at a healthy weight.  This is very important if you put on weight around the waist. Losing even 10 pounds can help you lower your blood pressure. · Manage other health problems such as diabetes and high cholesterol. You can help lower your risk for heart disease and blood vessel problems with a healthy lifestyle along with medicines. · Do not take ibuprofen (Advil, Motrin) or naproxen (Aleve), or similar medicines, unless your doctor tells you to. They may make chronic kidney disease worse. It is okay to take acetaminophen (Tylenol). · If your doctor recommends it, get more exercise. Walking is a good choice. Bit by bit, increase the amount you walk every day. Try for at least 30 minutes on most days of the week. You also may want to swim, bike, or do other activities. · Limit or avoid alcohol. Talk to your doctor about whether you can drink any alcohol. · Do not smoke or allow others to smoke around you. If you need help quitting, talk to your doctor about stop-smoking programs and medicines. These can increase your chances of quitting for good. When should you call for help? Call 911 anytime you think you may need emergency care. For example, call if:  · You passed out (lost consciousness). · You have symptoms of a heart attack, such as:  ¨ Chest pain or pressure. ¨ Sweating. ¨ Shortness of breath. ¨ Nausea or vomiting. ¨ Pain that spreads from the chest to the neck, jaw, or one or both shoulders or arms. ¨ Dizziness or lightheadedness. ¨ A fast or uneven pulse. After calling 911, chew 1 adult-strength aspirin. Wait for an ambulance. Do not try to drive yourself. Call your doctor now or seek immediate medical care if:  · You are confused or are more tired or weak than usual.  · You bleed or have bruises. Watch closely for changes in your health, and be sure to contact your doctor if:  · You do not want to eat. · You have new swelling of your arms or feet, or swelling that you already have gets worse. · You do not get better as expected. Where can you learn more? Go to http://yonas.info/.   Enter F899 in the search box to learn more about \"Kidney Disease and High Blood Pressure: Care Instructions. \"  Current as of: November 20, 2015  Content Version: 11.2  © 5494-7250 [employer]. Care instructions adapted under license by [employer] (which disclaims liability or warranty for this information). If you have questions about a medical condition or this instruction, always ask your healthcare professional. [place] [address] any warranty or liability for your use of this information. Gastrointestinal hemorrhage, unspecified gastrointestinal hemorrhage type

## 2019-01-16 RX ORDER — HYDROCHLOROTHIAZIDE 12.5 MG/1
CAPSULE ORAL
Qty: 90 CAP | Refills: 1 | Status: SHIPPED | OUTPATIENT
Start: 2019-01-16 | End: 2019-07-23 | Stop reason: SDUPTHER

## 2019-02-25 ENCOUNTER — TELEPHONE (OUTPATIENT)
Dept: INTERNAL MEDICINE CLINIC | Facility: CLINIC | Age: 84
End: 2019-02-25

## 2019-02-25 NOTE — TELEPHONE ENCOUNTER
Granddaughter Jaelyn Peacock) would like return call from nurse about who Dr. Rosemary Cohen would refer her to for her ears, states pt having trouble hearing  May be reached at [phone]

## 2019-02-26 NOTE — TELEPHONE ENCOUNTER
----- Message from Virginie Ventura sent at 2/26/2019 11:48 AM EST -----  Regarding: /Telephone  Froylan Tena, requesting a call back regarding hearing specialist information.  Best contact:[phone] (call around 4 pm)

## 2019-02-26 NOTE — TELEPHONE ENCOUNTER
Given Dr Chidi Eckert and Dr Kailey Moon phone number, informed that we can check for wax in the ears. Appointment given for 2/28/19.

## 2019-02-28 ENCOUNTER — OFFICE VISIT (OUTPATIENT)
Dept: INTERNAL MEDICINE CLINIC | Facility: CLINIC | Age: 84
End: 2019-02-28

## 2019-02-28 VITALS
DIASTOLIC BLOOD PRESSURE: 67 MMHG | SYSTOLIC BLOOD PRESSURE: 151 MMHG | RESPIRATION RATE: 18 BRPM | OXYGEN SATURATION: 95 % | HEART RATE: 81 BPM | BODY MASS INDEX: 19.74 KG/M2 | WEIGHT: 125.8 LBS | HEIGHT: 67 IN | TEMPERATURE: 97.9 F

## 2019-02-28 DIAGNOSIS — I73.9 ASYMPTOMATIC PVD (PERIPHERAL VASCULAR DISEASE) (HCC): ICD-10-CM

## 2019-02-28 DIAGNOSIS — H61.23 BILATERAL IMPACTED CERUMEN: ICD-10-CM

## 2019-02-28 DIAGNOSIS — Z00.00 MEDICARE ANNUAL WELLNESS VISIT, SUBSEQUENT: Primary | ICD-10-CM

## 2019-02-28 DIAGNOSIS — Z13.31 SCREENING FOR DEPRESSION: ICD-10-CM

## 2019-02-28 DIAGNOSIS — E03.4 HYPOTHYROIDISM DUE TO ACQUIRED ATROPHY OF THYROID: ICD-10-CM

## 2019-02-28 DIAGNOSIS — I12.9 BENIGN HYPERTENSION WITH CHRONIC KIDNEY DISEASE, STAGE IV (HCC): ICD-10-CM

## 2019-02-28 DIAGNOSIS — Z86.73 CEREBROVASCULAR DISEASE, ARTERIOSCLEROTIC, POST-STROKE: ICD-10-CM

## 2019-02-28 DIAGNOSIS — E78.00 HYPERCHOLESTEROLEMIA: ICD-10-CM

## 2019-02-28 DIAGNOSIS — I67.2 CEREBROVASCULAR DISEASE, ARTERIOSCLEROTIC, POST-STROKE: ICD-10-CM

## 2019-02-28 DIAGNOSIS — Z71.89 ADVANCE DIRECTIVE DISCUSSED WITH PATIENT: ICD-10-CM

## 2019-02-28 DIAGNOSIS — N18.4 BENIGN HYPERTENSION WITH CHRONIC KIDNEY DISEASE, STAGE IV (HCC): ICD-10-CM

## 2019-02-28 NOTE — PROGRESS NOTES
Jeanine Castelan  Identified pt with two pt identifiers(name and ). Chief Complaint Patient presents with  
 Hearing Problem c/o not being able to hear at all on friday after going out to dinner Reviewed record In preparation for visit and have obtained necessary documentation. Has info on advanced directive but has not filled them out. 1. Have you been to the ER, urgent care clinic or hospitalized since your last visit? No  
 
2. Have you seen or consulted any other health care providers outside of the [address] [place] [address] since your last visit? Include any pap smears or colon screening. No 
 
Vitals reviewed with provider. Health Maintenance reviewed:  
 
Health Maintenance Due Topic  Shingrix Vaccine Age 50> (1 of 2)  MEDICARE YEARLY EXAM   
  
 
 
Wt Readings from Last 3 Encounters:  
19 125 lb 12.8 oz (57.1 kg)  
18 124 lb (56.2 kg) 18 125 lb 9.6 oz (57 kg) Temp Readings from Last 3 Encounters:  
19 97.9 °F (36.6 °C) (Oral)  
18 97.5 °F (36.4 °C) (Oral) 18 97.6 °F (36.4 °C) (Oral) BP Readings from Last 3 Encounters:  
19 151/67  
18 145/69  
18 148/68 Pulse Readings from Last 3 Encounters:  
19 81  
18 75  
18 75 Vitals:  
 19 1308 BP: 151/67 Pulse: 81 Resp: 18 Temp: 97.9 °F (36.6 °C) TempSrc: Oral  
SpO2: 95% Weight: 125 lb 12.8 oz (57.1 kg) Height: 5' 7\" (1.702 m) Learning Assessment:  
:  
 
 
Learning Assessment 2017 PRIMARY LEARNER Patient Patient HIGHEST LEVEL OF EDUCATION - PRIMARY LEARNER  DID NOT GRADUATE HIGH SCHOOL DID NOT GRADUATE HIGH SCHOOL  
BARRIERS PRIMARY LEARNER HEARING HEARING  
[address] CAREGIVER Yes Yes [address] [facility] William/Abramsonny Thomas/Jamin -  
CO-LEARNER HIGHEST LEVEL OF EDUCATION 4 YEARS OF COLLEGE SOME COLLEGE  
BARRIERS CO-LEARNER - NONE PRIMARY LANGUAGE ENGLISH ENGLISH  
PRIMARY LANGUAGE CO-LEARNER - ENGLISH  NEED - No  
LEARNER PREFERENCE PRIMARY LISTENING LISTENING  
LEARNER PREFERENCE CO-LEARNER - OTHER (COMMENT) LEARNING SPECIAL TOPICS - NA  
ANSWERED BY Patient patient RELATIONSHIP SELF SELF Depression Screening:  
:  
 
 
3 most recent PHQ Screens 2/28/2019 Little interest or pleasure in doing things Not at all Feeling down, depressed, irritable, or hopeless Several days Total Score PHQ 2 1 Trouble falling or staying asleep, or sleeping too much - Feeling tired or having little energy - Poor appetite, weight loss, or overeating - Feeling bad about yourself - or that you are a failure or have let yourself or your family down - Trouble concentrating on things such as school, work, reading, or watching TV - Moving or speaking so slowly that other people could have noticed; or the opposite being so fidgety that others notice - Thoughts of being better off dead, or hurting yourself in some way -  
PHQ 9 Score - How difficult have these problems made it for you to do your work, take care of your home and get along with others - Fall Risk Assessment:  
:  
 
 
Fall Risk Assessment, last 12 mths 7/13/2018 Able to walk? Yes Fall in past 12 months? Yes Fall with injury? Yes  
Number of falls in past 12 months 1 Fall Risk Score 2 Abuse Screening:  
:  
 
 
Abuse Screening Questionnaire 7/13/2018 8/4/2017 8/30/2016 6/18/2015 6/18/2014 Do you ever feel afraid of your partner? N N N N N Are you in a relationship with someone who physically or mentally threatens you? N N N N N Is it safe for you to go home? Y Y Y Y Y  
  
 
ADL Screening:  
:  
 
 
ADL Assessment 12/1/2014 Feeding yourself No Help Needed Getting from bed to chair No Help Needed Getting dressed No Help Needed Bathing or showering No Help Needed Walk across the room (includes cane/walker) No Help Needed Using the telphone No Help Needed Taking your medications Help Needed Preparing meals No Help Needed Managing money (expenses/bills) No Help Needed Moderately strenuous housework (laundry) No Help Needed Shopping for personal items (toiletries/medicines) Help Needed Shopping for groceries Help Needed Driving Help Needed Climbing a flight of stairs No Help Needed Getting to places beyond walking distances No Help Needed

## 2019-02-28 NOTE — PROGRESS NOTES
HISTORY OF PRESENT ILLNESS Mayo Dahl is a 80 y.o. female. She is accompanied by granddaughter and grandson-in-law. HPI She presents complaining of difficulty hearing. Hears better out of left ear than right. Wants to see there is cerumen. Suddenly her hearing decreased last week. Yesterday it seemed better. Has had hearing aids in past, but would not wear them. She presents for follow up of hypertension with CKD, hyperlipidemia, peripheral vascular disease and history of prior stroke. Diet and Lifestyle: generally follows a low fat low cholesterol diet, generally follows a low sodium diet, sedentary, nonsmoker Medication compliance: compliant all of the time Medication side effects: none Home BP Monitoring: not done Cardiovascular ROS: 
She denies palpitations, orthopnea, exertional chest pressure/discomfort, claudication, lower extremity edema, dyspnea on exertion, dizziness She presents for follow up of hypothyroidism. Symptoms include none. Patient denies weight changes, heat / cold intolerance, change in energy level. Course to date has been well controlled. Patient Active Problem List  
Diagnosis Code  Postherpetic neuralgia B02.29  
 Cerebrovascular disease, arteriosclerotic, post-stroke I67.2, Z86.73  
 Asymptomatic PVD (peripheral vascular disease) ([facility]) I73.9  Hypercholesterolemia E78.00  Palpitations R00.2  Benign hypertension with chronic kidney disease, stage IV ([facility]) I12.9, N18.4  Hypothyroidism due to acquired atrophy of thyroid E03.4 Past Medical History:  
Diagnosis Date  Chronic kidney disease  CKD (chronic kidney disease) stage 3, GFR 30-59 ml/min ([facility]) 5/17/2010  Hypercholesteremia  Hypertension  Post herpetic neuralgia  Stroke ([facility] [address]  Thyroid disease Past Surgical History:  
Procedure Laterality Date  HX APPENDECTOMY  HX HYSTERECTOMY  1947  
 one ovary out Social History Socioeconomic History  Marital status:  Spouse name: Not on file  Number of children: Not on file  Years of education: Not on file  Highest education level: Not on file Tobacco Use  Smoking status: Never Smoker  Smokeless tobacco: Never Used Substance and Sexual Activity  Alcohol use: No  
 Drug use: No  
 
Family History Problem Relation Age of Onset  Cancer Father Allergies Allergen Reactions  Lyrica [Pregabalin] Other (comments) Couldn't function  Macrobid [Nitrofurantoin Monohyd/M-Cryst] Other (comments)  
  sick  Neurontin [Gabapentin] Other (comments) groggy Current Outpatient Medications Medication Sig Dispense Refill  hydroCHLOROthiazide (MICROZIDE) 12.5 mg capsule TAKE 1 CAPSULE BY MOUTH ONCE DAILY 90 Cap 1  simvastatin (ZOCOR) 20 mg tablet TAKE ONE TABLET BY MOUTH NIGHTLY 30 Tab 11  
 levothyroxine (SYNTHROID) 50 mcg tablet Take 2 tablets on Sat and Sun and 1 tablet the other 5 days. 40 Tab 11  
 nortriptyline (PAMELOR) 10 mg capsule TAKE ONE TO TWO CAPSULES BY MOUTH AT BEDTIME FOR  SHINGLES  PAIN 60 Cap 5  
 aspirin delayed-release 81 mg tablet Take 1 Tab by mouth daily. 90 Tab 3  
 traMADol (ULTRAM) 50 mg tablet Take 1 Tab by mouth every six (6) hours as needed for Pain. Max Daily Amount: 200 mg. Indications: PAIN 30 Tab 1  polyethylene glycol (MIRALAX) 17 gram/dose powder Take 17 g by mouth two (2) times a day. 1 tablespoon with 8 oz of water daily 510 g 0  
 acetaminophen (TYLENOL EXTRA STRENGTH) 500 mg tablet Take  by mouth every six (6) hours as needed. Review of Systems Constitutional: Positive for malaise/fatigue. Negative for weight loss. Gastrointestinal: Positive for constipation. Negative for diarrhea and heartburn. Musculoskeletal: Negative for back pain and joint pain. Neurological: Negative for dizziness, tingling and focal weakness. Visit Vitals /67 (BP 1 Location: Left arm, BP Patient Position: Sitting) Pulse 81 Temp 97.9 °F (36.6 °C) (Oral) Resp 18 Ht 5' 7\" (1.702 m) Wt 125 lb 12.8 oz (57.1 kg) SpO2 95% BMI 19.70 kg/m² Physical Exam  
Constitutional: She is oriented to person, place, and time. She appears well-developed and well-nourished. HENT:  
Head: Normocephalic and atraumatic. Right Ear: Tympanic membrane and ear canal normal.  
Left Ear: Tympanic membrane and ear canal normal.  
Nose: No mucosal edema. Mouth/Throat: Oropharynx is clear and moist and mucous membranes are normal. Mucous membranes are not pale and not dry. No oropharyngeal exudate, posterior oropharyngeal edema or posterior oropharyngeal erythema. Initially bilateral cerumen impactions. Ear canals and TM's normal after removal.   
Eyes: Conjunctivae are normal. Pupils are equal, round, and reactive to light. Right eye exhibits no discharge. Left eye exhibits no discharge. Neck: Neck supple. Cardiovascular: Normal rate, regular rhythm, S1 normal, S2 normal and normal heart sounds. Exam reveals no gallop. No murmur heard. Pulses: 
     Dorsalis pedis pulses are 0 on the right side, and 0 on the left side. Posterior tibial pulses are 0 on the right side, and 0 on the left side. Pulmonary/Chest: Effort normal and breath sounds normal. She has no wheezes. She has no rhonchi. She has no rales. Lymphadenopathy:  
  She has no cervical adenopathy. Neurological: She is alert and oriented to person, place, and time. Skin: Skin is warm, dry and intact. No rash noted. Nursing note and vitals reviewed. Results for orders placed or performed in visit on 02/28/19 METABOLIC PANEL, BASIC Result Value Ref Range Glucose 77 65 - 99 mg/dL BUN 36 10 - 36 mg/dL Creatinine 1.25 (H) 0.57 - 1.00 mg/dL GFR est non-AA 34 (L) >59 mL/min/1.73  GFR est AA 39 (L) >59 mL/min/1.73  
 BUN/Creatinine ratio 29 (H) 12 - 28  
 Sodium 141 134 - 144 mmol/L Potassium 4.4 3.5 - 5.2 mmol/L Chloride 100 96 - 106 mmol/L  
 CO2 26 20 - 29 mmol/L Calcium 9.4 8.7 - 10.3 mg/dL Lab Results Component Value Date/Time Cholesterol, total 153 01/26/2018 01:34 PM  
 HDL Cholesterol 89 01/26/2018 01:34 PM  
 LDL,Direct 55 06/18/2015 03:43 PM  
 LDL, calculated 56 01/26/2018 01:34 PM  
 VLDL, calculated 8 01/26/2018 01:34 PM  
 Triglyceride 41 01/26/2018 01:34 PM  
 CHOL/HDL Ratio 2.1 11/10/2010 09:50 AM  
 
Lab Results Component Value Date/Time TSH 2.810 11/13/2018 11:35 AM  
 T4, Free 1.21 08/23/2016 12:16 PM  
 
 
ASSESSMENT and PLAN 
  ICD-10-CM ICD-9-CM 1. Medicare annual wellness visit, subsequent Z00.00 V70.0 2. Advance directive discussed with patient Z71.89 V65.49   
3. Benign hypertension with chronic kidney disease, stage IV ([facility]) E41.4 090.52 METABOLIC PANEL, BASIC  
 Q73.5 585.4 4. Hypothyroidism due to acquired atrophy of thyroid E03.4 244.8   
  246.8 5. Hypercholesterolemia E78.00 272.0 6. Cerebrovascular disease, arteriosclerotic, post-stroke I67.2 437.0 Z86.73 V12.54   
7. Asymptomatic PVD (peripheral vascular disease) ([facility]) I73.9 443.9 8. Bilateral impacted cerumen H61.23 380.4 REMOVE IMPACTED EAR WAX 9. Screening for depression Z13.31 V79.0 [id number] [employer] Diagnoses and all orders for this visit: 
 
1. Medicare annual wellness visit, subsequent 2. Advance directive discussed with patient 3. Benign hypertension with chronic kidney disease, stage IV ([address] Borderline control Our goal for her is <150/90 given advanced age. Will not change medication for 2 points above. Creat is stable. -     METABOLIC PANEL, BASIC 4. Hypothyroidism due to acquired atrophy of thyroid Euthyroid on replacement. 5. Hypercholesterolemia Hyperlipidemia is controlled 6. Cerebrovascular disease, arteriosclerotic, post-stroke Stable taking antiplatelet agent and statin. 7. Asymptomatic PVD (peripheral vascular disease) ([facility] [address] Stable taking antiplatelet agent and statin. 8. Bilateral impacted cerumen Resolved today. Remaining hearing loss presbyacusis. -     REMOVE IMPACTED EAR WAX 9. Screening for depression-Negative -     [id number] [employer] Follow-up Disposition: 
Return in about 4 months (around 6/28/2019) for HTN, CKD, thyroid   Cancel appt on Mar 14. 
lab results and schedule of future lab studies reviewed with patient 
reviewed diet, exercise and weight control I have discussed the diagnosis, evaluation and treatment options and the intended plan with the patient. Patient understands and is in agreement. The patient has received an after-visit summary and questions were answered concerning future plans. I have discussed side effects and warnings of any new medications with the patient as well.

## 2019-02-28 NOTE — ACP (ADVANCE CARE PLANNING)
With patient's permission advance care planning discussed. Health Care Agent would be granddaughter Christiane Arnett. [address] would be grandson Grant Oneil. She wants no life prolonging treatment if death is imminent. She wants life prolonging treatment for 2 weeks looking for improvement if there is overwhelming, permanent neurologic injury. Reviewed paperwork. Conversation took 4 minutes.

## 2019-02-28 NOTE — PATIENT INSTRUCTIONS
Take Miralax every day so you do not get backed up. Can add stool softener such as colace twice a day. May take Beano prior to meals that cause gas. Cancel appointment in March and return in 4 months. The best way to stay healthy is to live a healthy lifestyle. A healthy lifestyle includes regular exercise, eating a well-balanced diet, keeping a healthy weight and not smoking. Regular physical exams and screening tests are another important way to take care of yourself. Preventive exams provided by health care providers can find health problems early when treatment works best and can keep you from getting certain diseases or illnesses. Preventive services include exams, lab tests, screenings, shots, monitoring and information to help you take care of your own health. All people over 65 should have a pneumonia shot. Pneumonia shots are usually only needed once in a lifetime unless your doctor decides differently. In addition to your physical exam, some screening tests are recommended: 
 
All people over 65 should have a yearly flu shot. People over 65 are at medium to high risk for Hepatitis B. Three shots are needed for complete protection. Bone mass measurement (dexa scan) is recommended every two years. Diabetes Mellitus screening is recommended every year. Glaucoma is an eye disease caused by high pressure in the eye. An eye exam is recommended every year. Cardiovascular screening tests that check your cholesterol and other blood fat (lipid) levels are recommended every five years. Colorectal Cancer screening tests help to find pre-cancerous polyps (growths in the colon) so they can be removed before they turn into cancer. Tests ordered for screening depend on your personal and family history risk factors. Prostate Cancer Screening (annually up to age 76) Screening for breast cancer is recommended yearly with a Mammogram. 
 
 Screening for cervical and vaginal cancer is recommended with a pelvic and Pap test every two years. However if you have had an abnormal pap in the past  three years or at high risk for cervical or vaginal cancer Medicare will cover a pap test and a pelvic exam every year. Here is a list of your current Health Maintenance items with a due date: 
Health Maintenance Due Topic Date Due  Shingles Vaccine (1 of 2) 02/14/1962 [facility] Annual Well Visit  02/03/2019 Well Visit, Over 72: Care Instructions Your Care Instructions Physical exams can help you stay healthy. Your doctor has checked your overall health and may have suggested ways to take good care of yourself. He or she also may have recommended tests. At home, you can help prevent illness with healthy eating, regular exercise, and other steps. Follow-up care is a key part of your treatment and safety. Be sure to make and go to all appointments, and call your doctor if you are having problems. It's also a good idea to know your test results and keep a list of the medicines you take. How can you care for yourself at home? · Reach and stay at a healthy weight. This will lower your risk for many problems, such as obesity, diabetes, heart disease, and high blood pressure. · Get at least 30 minutes of exercise on most days of the week. Walking is a good choice. You also may want to do other activities, such as running, swimming, cycling, or playing tennis or team sports. · Do not smoke. Smoking can make health problems worse. If you need help quitting, talk to your doctor about stop-smoking programs and medicines. These can increase your chances of quitting for good. · Protect your skin from too much sun. When you're outdoors from 10 a.m. to 4 p.m., stay in the shade or cover up with clothing and a hat with a wide brim. Wear sunglasses that block UV rays. Even when it's cloudy, put broad-spectrum sunscreen (SPF 30 or higher) on any exposed skin. · See a dentist one or two times a year for checkups and to have your teeth cleaned. · Wear a seat belt in the car. · Limit alcohol to 2 drinks a day for men and 1 drink a day for women. Too much alcohol can cause health problems. Follow your doctor's advice about when to have certain tests. These tests can spot problems early. For men and women · Cholesterol. Your doctor will tell you how often to have this done based on your overall health and other things that can increase your risk for heart attack and stroke. · Blood pressure. Have your blood pressure checked during a routine doctor visit. Your doctor will tell you how often to check your blood pressure based on your age, your blood pressure results, and other factors. · Diabetes. Ask your doctor whether you should have tests for diabetes. · Vision. Experts recommend that you have yearly exams for glaucoma and other age-related eye problems. · Hearing. Tell your doctor if you notice any change in your hearing. You can have tests to find out how well you hear. · Colon cancer tests. Keep having colon cancer tests as your doctor recommends. You can have one of several types of tests. · Heart attack and stroke risk. At least every 4 to 6 years, you should have your risk for heart attack and stroke assessed. Your doctor uses factors such as your age, blood pressure, cholesterol, and whether you smoke or have diabetes to show what your risk for a heart attack or stroke is over the next 10 years. · Osteoporosis. Talk to your doctor about whether you should have a bone density test to find out whether you have thinning bones. Also ask your doctor about whether you should take calcium and vitamin D supplements. For women · Pap test and pelvic exam. You may no longer need a Pap test. Talk with your doctor about whether to stop or continue to have Pap tests.  
· Breast exam and mammogram. Ask how often you should have a mammogram, which is an X-ray of your breasts. A mammogram can spot breast cancer before it can be felt and when it is easiest to treat. · Thyroid disease. Talk to your doctor about whether to have your thyroid checked as part of a regular physical exam. Women have an increased chance of a thyroid problem. For men · Prostate exam. Talk to your doctor about whether you should have a blood test (called a PSA test) for prostate cancer. Experts disagree on whether men should have this test. Some experts recommend that you discuss the benefits and risks of the test with your doctor. · Abdominal aortic aneurysm. Ask your doctor whether you should have a test to check for an aneurysm. You may need a test if you ever smoked or if your parent, brother, sister, or child has had an aneurysm. When should you call for help? Watch closely for changes in your health, and be sure to contact your doctor if you have any problems or symptoms that concern you. Where can you learn more? Go to http://travis-benita.info/. Enter I482 in the search box to learn more about \"Well Visit, Over 65: Care Instructions. \" Current as of: March 28, 2018 Content Version: 11.9 © 7356-0522 [employer]. Care instructions adapted under license by [employer] (which disclaims liability or warranty for this information). If you have questions about a medical condition or this instruction, always ask your healthcare professional. [address] any warranty or liability for your use of this information.

## 2019-02-28 NOTE — PROGRESS NOTES
This is the Subsequent Medicare Annual Wellness Exam, performed 12 months or more after the Initial AWV or the last Subsequent AWV I have reviewed the patient's medical history in detail and updated the computerized patient record. History Past Medical History:  
Diagnosis Date  Chronic kidney disease  CKD (chronic kidney disease) stage 3, GFR 30-59 ml/min ([facility]) 5/17/2010  Hypercholesteremia  Hypertension  Post herpetic neuralgia  Stroke ([facility] [address]  Thyroid disease Past Surgical History:  
Procedure Laterality Date  HX APPENDECTOMY  HX HYSTERECTOMY  1947  
 one ovary out Current Outpatient Medications Medication Sig Dispense Refill  hydroCHLOROthiazide (MICROZIDE) 12.5 mg capsule TAKE 1 CAPSULE BY MOUTH ONCE DAILY 90 Cap 1  simvastatin (ZOCOR) 20 mg tablet TAKE ONE TABLET BY MOUTH NIGHTLY 30 Tab 11  
 levothyroxine (SYNTHROID) 50 mcg tablet Take 2 tablets on Sat and Sun and 1 tablet the other 5 days. 40 Tab 11  
 nortriptyline (PAMELOR) 10 mg capsule TAKE ONE TO TWO CAPSULES BY MOUTH AT BEDTIME FOR  SHINGLES  PAIN 60 Cap 5  
 aspirin delayed-release 81 mg tablet Take 1 Tab by mouth daily. 90 Tab 3  
 traMADol (ULTRAM) 50 mg tablet Take 1 Tab by mouth every six (6) hours as needed for Pain. Max Daily Amount: 200 mg. Indications: PAIN 30 Tab 1  polyethylene glycol (MIRALAX) 17 gram/dose powder Take 17 g by mouth two (2) times a day. 1 tablespoon with 8 oz of water daily 510 g 0  
 acetaminophen (TYLENOL EXTRA STRENGTH) 500 mg tablet Take  by mouth every six (6) hours as needed. Allergies Allergen Reactions  Lyrica [Pregabalin] Other (comments) Couldn't function  Macrobid [Nitrofurantoin Monohyd/M-Cryst] Other (comments)  
  sick  Neurontin [Gabapentin] Other (comments) groggy Family History Problem Relation Age of Onset  Cancer Father Social History Tobacco Use  Smoking status: Never Smoker  Smokeless tobacco: Never Used Substance Use Topics  Alcohol use: No  
 
Patient Active Problem List  
Diagnosis Code  Postherpetic neuralgia B02.29  
 Cerebrovascular disease, arteriosclerotic, post-stroke I67.2, Z86.73  
 Asymptomatic PVD (peripheral vascular disease) ([facility]) I73.9  Hypercholesterolemia E78.00  Palpitations R00.2  Benign hypertension with chronic kidney disease, stage IV ([facility]) I12.9, N18.4  Advance directive discussed with patient Z70.80  
 Hypothyroidism due to acquired atrophy of thyroid E03.4 Depression Risk Factor Screening:  
 
3 most recent PHQ Screens 2/28/2019 Little interest or pleasure in doing things Not at all Feeling down, depressed, irritable, or hopeless Several days Total Score PHQ 2 1 Trouble falling or staying asleep, or sleeping too much - Feeling tired or having little energy - Poor appetite, weight loss, or overeating - Feeling bad about yourself - or that you are a failure or have let yourself or your family down - Trouble concentrating on things such as school, work, reading, or watching TV - Moving or speaking so slowly that other people could have noticed; or the opposite being so fidgety that others notice - Thoughts of being better off dead, or hurting yourself in some way -  
PHQ 9 Score - How difficult have these problems made it for you to do your work, take care of your home and get along with others - Alcohol Risk Factor Screening: You do not drink alcohol or very rarely. Functional Ability and Level of Safety:  
Hearing Loss The patient needs further evaluation. Activities of Daily Living The home contains: handrails and grab bars Patient needs help with:  transportation, shopping, managing money and walking Fall Risk Fall Risk Assessment, last 12 mths 7/13/2018 Able to walk? Yes Fall in past 12 months? Yes Fall with injury? Yes  
Number of falls in past 12 months 1 Fall Risk Score 2  
 
 
 Abuse Screen Patient is not abused Cognitive Screening Evaluation of Cognitive Function: 
Has your family/caregiver stated any concerns about your memory: no 
Normal 
 
Patient Care Team  
Patient Care Team: 
Evan Beltran MD as PCP - General (Internal Medicine) Assessment/Plan Education and counseling provided: 
End-of-Life planning (with patient's consent) Health Maintenance Due Topic Date Due  Shingrix Vaccine Age 50> (1 of 2) 02/14/1962  MEDICARE YEARLY EXAM  02/03/2019

## 2019-03-01 LAB
BUN SERPL-MCNC: 36 MG/DL (ref 10–36)
BUN/CREAT SERPL: 29 (ref 12–28)
CALCIUM SERPL-MCNC: 9.4 MG/DL (ref 8.7–10.3)
CHLORIDE SERPL-SCNC: 100 MMOL/L (ref 96–106)
CO2 SERPL-SCNC: 26 MMOL/L (ref 20–29)
CREAT SERPL-MCNC: 1.25 MG/DL (ref 0.57–1)
GLUCOSE SERPL-MCNC: 77 MG/DL (ref 65–99)
POTASSIUM SERPL-SCNC: 4.4 MMOL/L (ref 3.5–5.2)
SODIUM SERPL-SCNC: 141 MMOL/L (ref 134–144)

## 2019-06-17 ENCOUNTER — OFFICE VISIT (OUTPATIENT)
Dept: INTERNAL MEDICINE CLINIC | Facility: CLINIC | Age: 84
End: 2019-06-17

## 2019-06-17 VITALS
DIASTOLIC BLOOD PRESSURE: 71 MMHG | SYSTOLIC BLOOD PRESSURE: 131 MMHG | HEART RATE: 82 BPM | HEIGHT: 67 IN | RESPIRATION RATE: 12 BRPM | WEIGHT: 120 LBS | OXYGEN SATURATION: 95 % | BODY MASS INDEX: 18.83 KG/M2 | TEMPERATURE: 97.5 F

## 2019-06-17 DIAGNOSIS — I67.2 CEREBROVASCULAR DISEASE, ARTERIOSCLEROTIC, POST-STROKE: ICD-10-CM

## 2019-06-17 DIAGNOSIS — E03.4 HYPOTHYROIDISM DUE TO ACQUIRED ATROPHY OF THYROID: ICD-10-CM

## 2019-06-17 DIAGNOSIS — E78.00 HYPERCHOLESTEROLEMIA: ICD-10-CM

## 2019-06-17 DIAGNOSIS — N18.4 BENIGN HYPERTENSION WITH CHRONIC KIDNEY DISEASE, STAGE IV (HCC): Primary | ICD-10-CM

## 2019-06-17 DIAGNOSIS — I73.9 ASYMPTOMATIC PVD (PERIPHERAL VASCULAR DISEASE) (HCC): ICD-10-CM

## 2019-06-17 DIAGNOSIS — Z86.73 CEREBROVASCULAR DISEASE, ARTERIOSCLEROTIC, POST-STROKE: ICD-10-CM

## 2019-06-17 DIAGNOSIS — I12.9 BENIGN HYPERTENSION WITH CHRONIC KIDNEY DISEASE, STAGE IV (HCC): Primary | ICD-10-CM

## 2019-06-17 NOTE — PROGRESS NOTES
Yovana Mohamud  Identified pt with two pt identifiers(name and ). Chief Complaint   Patient presents with    Hypertension    Chronic Kidney Disease    Thyroid Problem       Reviewed record In preparation for visit and have obtained necessary documentation. Has info on advanced directive but has not filled them out. 1. Have you been to the ER, urgent care clinic or hospitalized since your last visit? No     2. Have you seen or consulted any other health care providers outside of the [address] [place] [address] since your last visit? Include any pap smears or colon screening. [place]     Vitals reviewed with provider.     Health Maintenance reviewed:     Health Maintenance Due   Topic    Shingrix Vaccine Age 50> (1 of 2)          Wt Readings from Last 3 Encounters:   19 120 lb (54.4 kg)   19 125 lb 12.8 oz (57.1 kg)   18 124 lb (56.2 kg)        Temp Readings from Last 3 Encounters:   19 97.5 °F (36.4 °C) (Oral)   19 97.9 °F (36.6 °C) (Oral)   18 97.5 °F (36.4 °C) (Oral)        BP Readings from Last 3 Encounters:   19 131/71   19 151/67   18 145/69        Pulse Readings from Last 3 Encounters:   19 82   19 81   18 75        Vitals:    19 1101   BP: 131/71   Pulse: 82   Resp: 12   Temp: 97.5 °F (36.4 °C)   TempSrc: Oral   SpO2: 95%   Weight: 120 lb (54.4 kg)   Height: 5' 7\" (1.702 m)   PainSc:   0 - No pain          Learning Assessment:   :       Learning Assessment 2017   PRIMARY LEARNER Patient Patient   HIGHEST LEVEL OF EDUCATION - PRIMARY LEARNER  DID NOT GRADUATE HIGH SCHOOL DID NOT GRADUATE HIGH SCHOOL   BARRIERS PRIMARY LEARNER HEARING HEARING   CO-LEARNER CAREGIVER Yes Yes   CO-LEARNER NAME Tr Mccluremin [place] [address] [facility] [address] [facility]    NEED - No   LEARNER PREFERENCE PRIMARY LISTENING LISTENING   LEARNER PREFERENCE CO-LEARNER - OTHER (COMMENT)   LEARNING SPECIAL TOPICS - NA   ANSWERED BY Patient patient   RELATIONSHIP SELF SELF        Depression Screening:   :       3 most recent PHQ Screens 2/28/2019   Little interest or pleasure in doing things Not at all   Feeling down, depressed, irritable, or hopeless Several days   Total Score PHQ 2 1   Trouble falling or staying asleep, or sleeping too much -   Feeling tired or having little energy -   Poor appetite, weight loss, or overeating -   Feeling bad about yourself - or that you are a failure or have let yourself or your family down -   Trouble concentrating on things such as school, work, reading, or watching TV -   Moving or speaking so slowly that other people could have noticed; or the opposite being so fidgety that others notice -   Thoughts of being better off dead, or hurting yourself in some way -   PHQ 9 Score -   How difficult have these problems made it for you to do your work, take care of your home and get along with others -        Fall Risk Assessment:   :       Fall Risk Assessment, last 12 mths 7/13/2018   Able to walk? Yes   Fall in past 12 months? Yes   Fall with injury? Yes   Number of falls in past 12 months 1   Fall Risk Score 2        Abuse Screening:   :       Abuse Screening Questionnaire 7/13/2018 8/4/2017 8/30/2016 6/18/2015 6/18/2014   Do you ever feel afraid of your partner? N N N N N   Are you in a relationship with someone who physically or mentally threatens you? N N N N N   Is it safe for you to go home?  Y Y Y Y Y        ADL Screening:   :       ADL Assessment 12/1/2014   Feeding yourself No Help Needed   Getting from bed to chair No Help Needed   Getting dressed No Help Needed   Bathing or showering No Help Needed   Walk across the room (includes cane/walker) No Help Needed   Using the telphone No Help Needed   Taking your medications Help Needed   Preparing meals No Help Needed   Managing money (expenses/bills) No Help Needed   Moderately strenuous housework (laundry) No Help Needed   Shopping for personal items (toiletries/medicines) Help Needed   Shopping for groceries Help Needed   Driving Help Needed   Climbing a flight of stairs No Help Needed   Getting to places beyond walking distances No Help Needed

## 2019-06-17 NOTE — PATIENT INSTRUCTIONS
Office visit in 4 months with non-fasting lab work a week before visit. Hypothyroidism: Care Instructions  Your Care Instructions    You have hypothyroidism, which means that your body is not making enough thyroid hormone. This hormone helps your body use energy. If your thyroid level is low, you may feel tired, be constipated, have an increase in your blood pressure, or have dry skin or memory problems. You may also get cold easily, even when it is warm. Women with low thyroid levels may have heavy menstrual periods. A blood test to find your thyroid-stimulating hormone (TSH) level is used to check for hypothyroidism. A high TSH level may mean that you have low thyroid. When your body is not making enough thyroid hormone, TSH levels rise in an effort to make the body produce more. The treatment for hypothyroidism is to take thyroid hormone pills. You should start to feel better in 1 to 2 weeks. But it can take several months to see changes in the TSH level. You will need regular visits with your doctor to make sure you have the right dose of medicine. Most people need treatment for the rest of their lives. You will need to see your doctor regularly to have blood tests and to make sure you are doing well. Follow-up care is a key part of your treatment and safety. Be sure to make and go to all appointments, and call your doctor if you are having problems. It's also a good idea to know your test results and keep a list of the medicines you take. How can you care for yourself at home? · Take your thyroid hormone medicine exactly as prescribed. Call your doctor if you think you are having a problem with your medicine. Most people do not have side effects if they take the right amount of medicine regularly. ? Take the medicine 30 minutes before breakfast, and do not take it with calcium, vitamins, or iron. ? Do not take extra doses of your thyroid medicine.  It will not help you get better any faster, and it may cause side effects. ? If you forget to take a dose, do NOT take a double dose of medicine. Take your usual dose the next day. · Tell your doctor about all prescription, herbal, or over-the-counter products you take. · Take care of yourself. Eat a healthy diet, get enough sleep, and get regular exercise. When should you call for help? Call 911 anytime you think you may need emergency care. For example, call if:    · You passed out (lost consciousness).     · You have severe trouble breathing.     · You have a very slow heartbeat (less than 60 beats a minute).     · You have a low body temperature (95°F or below).    Call your doctor now or seek immediate medical care if:    · You feel tired, sluggish, or weak.     · You have trouble remembering things or concentrating.     · You do not begin to feel better 2 weeks after starting your medicine.    Watch closely for changes in your health, and be sure to contact your doctor if you have any problems. Where can you learn more? Go to http://travis-benita.info/. Enter I726 in the search box to learn more about \"Hypothyroidism: Care Instructions. \"  Current as of: March 14, 2018  Content Version: 11.9  © 7284-1744 [employer]. Care instructions adapted under license by [employer] (which disclaims liability or warranty for this information). If you have questions about a medical condition or this instruction, always ask your healthcare professional. [place] [address] any warranty or liability for your use of this information.

## 2019-06-17 NOTE — PROGRESS NOTES
HISTORY OF PRESENT ILLNESS  Joanna Arzola is a 80 y.o. female. She is accompanied by granddaughter and her . HPI   She presents for follow up of hypertension with CKD, hyperlipidemia, peripheral vascular disease and cerebrovascular disease post CVA. She has lost 5.5 lbs since February. Diet and Lifestyle: generally follows a low fat low cholesterol diet, generally follows a low sodium diet, sedentary, nonsmoker  Medication compliance: compliant all of the time  Medication side effects: none  Home BP Monitoring: not done  Cardiovascular ROS:  She complains of chest pain, dyspnea on exertion, dizziness. She denies palpitations, orthopnea, exertional chest pressure/discomfort, claudication     She presents for follow up of hypothyroidism. Symptoms include weight changes, cold intolerance, change in energy level. Course to date has been well controlled.     Patient Active Problem List   Diagnosis Code    Postherpetic neuralgia B02.29    Cerebrovascular disease, arteriosclerotic, post-stroke I67.2, Z86.73    Asymptomatic PVD (peripheral vascular disease) ([facility]) I73.9    Hypercholesterolemia E78.00    Palpitations R00.2    Benign hypertension with chronic kidney disease, stage IV ([facility]) I12.9, N18.4    Hypothyroidism due to acquired atrophy of thyroid E03.4     Past Medical History:   Diagnosis Date    Chronic kidney disease     CKD (chronic kidney disease) stage 3, GFR 30-59 ml/min ([facility] [address] [address] 5/17/2010    Hypercholesteremia     Hypertension     Post herpetic neuralgia     Stroke ([facility]     Thyroid disease      Past Surgical History:   Procedure Laterality Date    HX APPENDECTOMY      HX HYSTERECTOMY  1947    one ovary out     Social History     Socioeconomic History    Marital status:      Spouse name: Not on file    Number of children: Not on file    Years of education: Not on file    Highest education level: Not on file   Tobacco Use    Smoking status: Never Smoker    Smokeless tobacco: Never Used   Substance and Sexual Activity    Alcohol use: No    Drug use: No     Family History   Problem Relation Age of Onset    Cancer Father      Allergies   Allergen Reactions    Lyrica [Pregabalin] Other (comments)     Couldn't function    Macrobid [Nitrofurantoin Monohyd/M-Cryst] Other (comments)     sick    Neurontin [Gabapentin] Other (comments)     groggy     Current Outpatient Medications   Medication Sig Dispense Refill    vit C,V-Kl-amzen-lutein-zeaxan (PRESERVISION AREDS-2) 451-344-87-1 mg-unit-mg-mg cap capsule Take 1 Cap by mouth two (2) times daily (with meals).  hydroCHLOROthiazide (MICROZIDE) 12.5 mg capsule TAKE 1 CAPSULE BY MOUTH ONCE DAILY 90 Cap 1    simvastatin (ZOCOR) 20 mg tablet TAKE ONE TABLET BY MOUTH NIGHTLY 30 Tab 11    levothyroxine (SYNTHROID) 50 mcg tablet Take 2 tablets on Sat and Sun and 1 tablet the other 5 days. 40 Tab 11    nortriptyline (PAMELOR) 10 mg capsule TAKE ONE TO TWO CAPSULES BY MOUTH AT BEDTIME FOR  SHINGLES  PAIN 60 Cap 5    aspirin delayed-release 81 mg tablet Take 1 Tab by mouth daily. 90 Tab 3    polyethylene glycol (MIRALAX) 17 gram/dose powder Take 17 g by mouth two (2) times a day. 1 tablespoon with 8 oz of water daily 510 g 0    acetaminophen (TYLENOL EXTRA STRENGTH) 500 mg tablet Take  by mouth every six (6) hours as needed.  traMADol (ULTRAM) 50 mg tablet Take 1 Tab by mouth every six (6) hours as needed for Pain. Max Daily Amount: 200 mg. Indications: PAIN 30 Tab 1       Review of Systems   Constitutional: Positive for malaise/fatigue and weight loss. HENT: Positive for hearing loss. Gastrointestinal: Negative for constipation and diarrhea. Musculoskeletal: Positive for back pain, joint pain and neck pain. Gets some relief with heat and Tylenol   Neurological: Negative for tingling and focal weakness.    Psychiatric/Behavioral: The patient is nervous/anxious (worried about grandson who has legal problem due to using drugs. ). Visit Vitals  /71 (BP 1 Location: Left arm, BP Patient Position: Sitting)   Pulse 82   Temp 97.5 °F (36.4 °C) (Oral)   Resp 12   Ht 5' 7\" (1.702 m)   Wt 120 lb (54.4 kg)   SpO2 95%   BMI 18.79 kg/m²     Physical Exam   Constitutional: She is oriented to person, place, and time. She appears well-developed and well-nourished. HENT:   Head: Normocephalic and atraumatic. Eyes: Pupils are equal, round, and reactive to light. Conjunctivae are normal.   Neck: Neck supple. Carotid bruit is not present. No thyromegaly present. Cardiovascular: Normal rate, regular rhythm and normal heart sounds. PMI is not displaced. Exam reveals no gallop. No murmur heard. Pulses:       Dorsalis pedis pulses are 1+ on the right side, and 1+ on the left side. Posterior tibial pulses are 1+ on the right side, and 1+ on the left side. Pulmonary/Chest: Effort normal. She has no wheezes. She has no rhonchi. She has no rales. Abdominal: Soft. Normal appearance. She exhibits no abdominal bruit and no mass. There is no hepatosplenomegaly. There is no tenderness. Musculoskeletal: She exhibits no edema. Lymphadenopathy:     She has no cervical adenopathy. Right: No supraclavicular adenopathy present. Left: No supraclavicular adenopathy present. Neurological: She is alert and oriented to person, place, and time. No sensory deficit. Skin: Skin is warm, dry and intact. No rash noted. Psychiatric: She has a normal mood and affect. Her behavior is normal.   Nursing note and vitals reviewed.     Lab Results   Component Value Date/Time    Sodium 141 02/28/2019 02:33 PM    Potassium 4.4 02/28/2019 02:33 PM    Chloride 100 02/28/2019 02:33 PM    CO2 26 02/28/2019 02:33 PM    Anion gap 7 10/28/2016 03:34 AM    Glucose 77 02/28/2019 02:33 PM    BUN 36 02/28/2019 02:33 PM    Creatinine 1.25 (H) 02/28/2019 02:33 PM    BUN/Creatinine ratio 29 (H) 02/28/2019 02:33 PM    GFR est AA 39 (L) 02/28/2019 02:33 PM    GFR est non-AA 34 (L) 02/28/2019 02:33 PM    Calcium 9.4 02/28/2019 02:33 PM    Bilirubin, total 0.3 01/26/2018 01:34 PM    AST (SGOT) 24 01/26/2018 01:34 PM    Alk. phosphatase 93 01/26/2018 01:34 PM    Protein, total 6.4 01/26/2018 01:34 PM    Albumin 3.8 01/26/2018 01:34 PM    Globulin 3.6 10/28/2016 03:34 AM    A-G Ratio 1.5 01/26/2018 01:34 PM    ALT (SGPT) 15 01/26/2018 01:34 PM     Lab Results   Component Value Date/Time    TSH 2.810 11/13/2018 11:35 AM    T4, Free 1.21 08/23/2016 12:16 PM       ASSESSMENT and PLAN    ICD-10-CM ICD-9-CM    1. Benign hypertension with chronic kidney disease, stage IV (HCC) I12.9 403.10 CBC WITH AUTOMATED DIFF    H53.6 383.8 METABOLIC PANEL, BASIC   2. Hypothyroidism due to acquired atrophy of thyroid E03.4 244.8 TSH AND FREE T4     246.8    3. Asymptomatic PVD (peripheral vascular disease) (HCC) I73.9 443.9    4. Cerebrovascular disease, arteriosclerotic, post-stroke I67.2 437.0     Z86.73 V12.54    5. Hypercholesterolemia E78.00 272.0      Diagnoses and all orders for this visit:    1. Benign hypertension with chronic kidney disease, stage IV (HCC)  Blood pressure is at goal and creatinine is stable. -     CBC WITH AUTOMATED DIFF; Future  -     METABOLIC PANEL, BASIC; Future    2. Hypothyroidism due to acquired atrophy of thyroid  Euthyroid on replacement.   -     TSH AND FREE T4; Future    3. Asymptomatic PVD (peripheral vascular disease) (HCC)  Stable taking antiplatelet agent and statin. No claudication, but also not very active. 4. Cerebrovascular disease, arteriosclerotic, post-stroke  Stable taking antiplatelet agent and statin. 5. Hypercholesterolemia  Hyperlipidemia is controlled      Follow-up and Dispositions    · Return in about 4 months (around 10/17/2019) for HTN, CKD, thyroid,   Non-fasting lab one week prior  .        lab results and schedule of future lab studies reviewed with patient  reviewed diet, exercise and weight control  I have discussed the diagnosis, evaluation and treatment options and the intended plan with the patient. Patient understands and is in agreement. The patient has received an after-visit summary and questions were answered concerning future plans. I have discussed side effects and warnings of any new medications with the patient as well.

## 2019-06-27 RX ORDER — NORTRIPTYLINE HYDROCHLORIDE 10 MG/1
CAPSULE ORAL
Qty: 60 CAP | Refills: 5 | Status: SHIPPED | OUTPATIENT
Start: 2019-06-27 | End: 2020-06-09 | Stop reason: SDUPTHER

## 2019-07-23 RX ORDER — HYDROCHLOROTHIAZIDE 12.5 MG/1
CAPSULE ORAL
Qty: 90 CAP | Refills: 1 | Status: SHIPPED | OUTPATIENT
Start: 2019-07-23 | End: 2020-01-15

## 2019-08-22 DIAGNOSIS — E03.4 HYPOTHYROIDISM DUE TO ACQUIRED ATROPHY OF THYROID: ICD-10-CM

## 2019-08-22 RX ORDER — LEVOTHYROXINE SODIUM 50 UG/1
TABLET ORAL
Qty: 40 TAB | Refills: 11 | Status: SHIPPED | OUTPATIENT
Start: 2019-08-22 | End: 2020-05-21 | Stop reason: SDUPTHER

## 2019-09-18 RX ORDER — SIMVASTATIN 20 MG/1
TABLET, FILM COATED ORAL
Qty: 30 TAB | Refills: 11 | OUTPATIENT
Start: 2019-09-18

## 2019-09-20 ENCOUNTER — TELEPHONE (OUTPATIENT)
Dept: INTERNAL MEDICINE CLINIC | Facility: CLINIC | Age: 84
End: 2019-09-20

## 2019-09-20 RX ORDER — SIMVASTATIN 20 MG/1
TABLET, FILM COATED ORAL
Qty: 30 TAB | Refills: 3 | Status: SHIPPED | OUTPATIENT
Start: 2019-09-20 | End: 2020-06-09 | Stop reason: SDUPTHER

## 2019-09-20 NOTE — TELEPHONE ENCOUNTER
----- Message from Christoph Jacobs sent at 9/20/2019  4:15 PM EDT -----  Regarding: Michelle/telephone  Contact: [phone]  Caller: Pt's granddaughter Lila Conrad  Reason for call: Confirming that the pt does want her cholesterol medicine refilled. Callback requested: Yes  Best contact: [phone]  Additional details: Also requesting that the nurse bring up this topic at the pt's next appt because the pt believes it is her BP medication and not her cholesterol medication.

## 2019-09-20 NOTE — TELEPHONE ENCOUNTER
Message given to Billye Fleischer (granddaughter), she will let us know what the pt would like to do.

## 2019-09-20 NOTE — TELEPHONE ENCOUNTER
----- Message from [place] sent at 9/20/2019  4:15 PM EDT -----  Regarding: Michelle/telephone  Contact: [phone]  Caller: Pt's granddaughter James Moreira  Reason for call: Confirming that the pt does want her cholesterol medicine refilled. Callback requested: Yes  Best contact: [phone]  Additional details: Also requesting that the nurse bring up this topic at the pt's next appt because the pt believes it is her BP medication and not her cholesterol medication.

## 2019-10-16 NOTE — PROGRESS NOTES
HISTORY OF PRESENT ILLNESS Sendy Cotter is a 80 y.o. female. She is accompanied by her granddaughter and grandson in law. HPI   She presents for follow up of hypertension with CKD, hyperlipidemia, peripheral vascular disease and cerebrovascular disease post CVA. Diet and Lifestyle: generally follows a low fat low cholesterol diet, generally follows a low sodium diet, sedentary, nonsmoker Medication compliance: compliant all of the time Medication side effects: none Home BP Monitorin's/80's 
Cardiovascular ROS: 
She complains of dyspnea on exertion. She denies palpitations, orthopnea, exertional chest pressure/discomfort, claudication, lower extremity edema, dizziness She presents for follow up of hypothyroidism. . Patient denies weight changes, heat / cold intolerance, change in energy level. Course to date has been well controlled. Sharp  Pain from mid to lower back on the right and into right thigh. It is usually releived with [place] heat. She tried apin patiches but pulled to omuch on skin to remove. Release lab orders Patient Active Problem List  
Diagnosis Code  Postherpetic neuralgia B02.29  
 Cerebrovascular disease, arteriosclerotic, post-stroke I67.2, Z86.73  
 Asymptomatic PVD (peripheral vascular disease) ([facility]) I73.9  Hypercholesterolemia E78.00  Palpitations R00.2  Benign hypertension with chronic kidney disease, stage IV ([facility]) I12.9, N18.4  Hypothyroidism due to acquired atrophy of thyroid E03.4 Past Medical History:  
Diagnosis Date  Chronic kidney disease  CKD (chronic kidney disease) stage 3, GFR 30-59 ml/min ([facility]) 2010  Hypercholesteremia  Hypertension  Post herpetic neuralgia  Stroke ([facility] [address]  Thyroid disease Past Surgical History:  
Procedure Laterality Date  HX APPENDECTOMY  HX HYSTERECTOMY    
 one ovary out Social History Socioeconomic History  Marital status:  Spouse name: Not on file  Number of children: Not on file  Years of education: Not on file  Highest education level: Not on file Tobacco Use  Smoking status: Never Smoker  Smokeless tobacco: Never Used Substance and Sexual Activity  Alcohol use: No  
 Drug use: No  
 
Family History Problem Relation Age of Onset  Cancer Father Allergies Allergen Reactions  Lyrica [Pregabalin] Other (comments) Couldn't function  Macrobid [Nitrofurantoin Monohyd/M-Cryst] Other (comments)  
  sick  Neurontin [Gabapentin] Other (comments) groggy Current Outpatient Medications Medication Sig Dispense Refill  multivit-min-FA-lycopen-lutein (CENTRUM SILVER) 0.4-300-250 mg-mcg-mcg tab Take  by mouth.  simvastatin (ZOCOR) 20 mg tablet TAKE ONE TABLET BY MOUTH NIGHTLY 30 Tab 3  
 levothyroxine (SYNTHROID) 50 mcg tablet TAKE 2 TABLETS BY MOUTH ON SATURDAY AND SUNDAY AND TAKE 1 TABLET BY MOUTH THE OTHER 5 DAYS 40 Tab 11  
 hydroCHLOROthiazide (MICROZIDE) 12.5 mg capsule TAKE 1 CAPSULE BY MOUTH ONCE DAILY 90 Cap 1  
 nortriptyline (PAMELOR) 10 mg capsule TAKE 1 TO 2 CAPSULES BY MOUTH AT BEDTIME FOR  SHINGLES  PAIN 60 Cap 5  
 vit C,B-Sq-fnvcz-lutein-zeaxan (PRESERVISION AREDS-2) 806-082-23-1 mg-unit-mg-mg cap capsule Take 1 Cap by mouth two (2) times daily (with meals).  aspirin delayed-release 81 mg tablet Take 1 Tab by mouth daily. 90 Tab 3  polyethylene glycol (MIRALAX) 17 gram/dose powder Take 17 g by mouth two (2) times a day. 1 tablespoon with 8 oz of water daily 510 g 0  
 acetaminophen (TYLENOL EXTRA STRENGTH) 500 mg tablet Take  by mouth every six (6) hours as needed. Review of Systems Constitutional: Negative for malaise/fatigue and weight loss. HENT: Positive for hearing loss. Gastrointestinal: Negative for constipation and diarrhea. Musculoskeletal: Positive for back pain and joint pain. Nocturnal leg cramps. Neurological: Negative for tingling and focal weakness. Visit Vitals /72 (BP 1 Location: Left arm, BP Patient Position: Sitting) Pulse 76 Resp 12 Ht 5' 7\" (1.702 m) Wt 123 lb (55.8 kg) SpO2 95% BMI 19.26 kg/m² Physical Exam  
Constitutional: She is oriented to person, place, and time. She appears well-developed and well-nourished. HENT:  
Head: Normocephalic and atraumatic. Eyes: Pupils are equal, round, and reactive to light. Conjunctivae are normal.  
Neck: Neck supple. Carotid bruit is not present. No thyromegaly present. Cardiovascular: Normal rate, regular rhythm and normal heart sounds. PMI is not displaced. Exam reveals no gallop. No murmur heard. Pulses: 
     Dorsalis pedis pulses are 2+ on the right side, and 2+ on the left side. Posterior tibial pulses are 2+ on the right side, and 2+ on the left side. Pulmonary/Chest: Effort normal. She has no wheezes. She has no rhonchi. She has no rales. Abdominal: Soft. Normal appearance. She exhibits no abdominal bruit and no mass. There is no hepatosplenomegaly. There is no tenderness. Musculoskeletal: She exhibits no edema. Lymphadenopathy:  
  She has no cervical adenopathy. Right: No supraclavicular adenopathy present. Left: No supraclavicular adenopathy present. Neurological: She is alert and oriented to person, place, and time. No sensory deficit. Skin: Skin is warm, dry and intact. No rash noted. Psychiatric: She has a normal mood and affect. Her behavior is normal.  
Nursing note and vitals reviewed. Lab Results Component Value Date/Time TSH 2.810 11/13/2018 11:35 AM  
 T4, Free 1.21 08/23/2016 12:16 PM  
 
Lab Results Component Value Date/Time  Sodium 141 02/28/2019 02:33 PM  
 Potassium 4.4 02/28/2019 02:33 PM  
 Chloride 100 02/28/2019 02:33 PM  
 CO2 26 02/28/2019 02:33 PM  
 Anion gap 7 10/28/2016 03:34 AM  
 Glucose 77 02/28/2019 02:33 PM  
 BUN 36 02/28/2019 02:33 PM  
 Creatinine 1.25 (H) 02/28/2019 02:33 PM  
 BUN/Creatinine ratio 29 (H) 02/28/2019 02:33 PM  
 GFR est AA 39 (L) 02/28/2019 02:33 PM  
 GFR est non-AA 34 (L) 02/28/2019 02:33 PM  
 Calcium 9.4 02/28/2019 02:33 PM  
 Bilirubin, total 0.3 01/26/2018 01:34 PM  
 AST (SGOT) 24 01/26/2018 01:34 PM  
 Alk. phosphatase 93 01/26/2018 01:34 PM  
 Protein, total 6.4 01/26/2018 01:34 PM  
 Albumin 3.8 01/26/2018 01:34 PM  
 Globulin 3.6 10/28/2016 03:34 AM  
 A-G Ratio 1.5 01/26/2018 01:34 PM  
 ALT (SGPT) 15 01/26/2018 01:34 PM  
 
Lab Results Component Value Date/Time Cholesterol, total 153 01/26/2018 01:34 PM  
 HDL Cholesterol 89 01/26/2018 01:34 PM  
 LDL, calculated 56 01/26/2018 01:34 PM  
 VLDL, calculated 8 01/26/2018 01:34 PM  
 Triglyceride 41 01/26/2018 01:34 PM  
 
 
ASSESSMENT and PLAN 
  ICD-10-CM ICD-9-CM 1. Benign hypertension with chronic kidney disease, stage IV ([facility]) G21.7 793.71 METABOLIC PANEL, BASIC  
 V03.6 585.4 CBC WITH AUTOMATED DIFF 2. Cerebrovascular disease, arteriosclerotic, post-stroke I67.2 437.0 Z86.73 V12.54   
3. Asymptomatic PVD (peripheral vascular disease) ([facility]) I73.9 443.9 4. Hypercholesterolemia E78.00 272.0   
5. Hypothyroidism due to acquired atrophy of thyroid E03.4 244.8 TSH AND FREE T4  
  246.8 6. SCC (squamous cell carcinoma), leg, left C44.729 173.72 REFERRAL TO DERMATOLOGY Diagnoses and all orders for this visit: 1. Benign hypertension with chronic kidney disease, stage IV ([facility] [address] Hypertension is controlled and creat is stable -     METABOLIC PANEL, BASIC 
-     CBC WITH AUTOMATED DIFF 2. Cerebrovascular disease, arteriosclerotic, post-stroke Stable taking antiplatelet agent and statin. 3. Asymptomatic PVD (peripheral vascular disease) ([address] Stable taking antiplatelet agent and statin. 4. Hypercholesterolemia Hyperlipidemia is controlled 5. Hypothyroidism due to acquired atrophy of thyroid Euthyroid on replacement.  
-     TSH AND FREE T4 
 
6. SCC (squamous cell carcinoma), leg, left This is at site of previously removed lesion. Shave biopsy in Nov 2017 and appeared completely removed based on pathology. No further treatment was planned unless it returned, which is it has. -     REFERRAL TO DERMATOLOGY Follow-up and Dispositions · Return in about 4 months (around 2/17/2020) for HTN, CKD, chol, PVD. lab results and schedule of future lab studies reviewed with patient 
reviewed diet, exercise and weight I have discussed the diagnosis, evaluation and treatment options and the intended plan with the patient. Patient understands and is in agreement. The patient has received an after-visit summary and questions were answered concerning future plans. I have discussed side effects and warnings of any new medications with the patient as well.

## 2019-10-17 ENCOUNTER — OFFICE VISIT (OUTPATIENT)
Dept: INTERNAL MEDICINE CLINIC | Facility: CLINIC | Age: 84
End: 2019-10-17

## 2019-10-17 VITALS
OXYGEN SATURATION: 95 % | HEIGHT: 67 IN | SYSTOLIC BLOOD PRESSURE: 132 MMHG | DIASTOLIC BLOOD PRESSURE: 72 MMHG | HEART RATE: 76 BPM | WEIGHT: 123 LBS | BODY MASS INDEX: 19.3 KG/M2 | TEMPERATURE: 95.3 F | RESPIRATION RATE: 12 BRPM

## 2019-10-17 DIAGNOSIS — I73.9 ASYMPTOMATIC PVD (PERIPHERAL VASCULAR DISEASE) (HCC): ICD-10-CM

## 2019-10-17 DIAGNOSIS — E03.4 HYPOTHYROIDISM DUE TO ACQUIRED ATROPHY OF THYROID: ICD-10-CM

## 2019-10-17 DIAGNOSIS — I12.9 BENIGN HYPERTENSION WITH CHRONIC KIDNEY DISEASE, STAGE IV (HCC): Primary | ICD-10-CM

## 2019-10-17 DIAGNOSIS — Z86.73 CEREBROVASCULAR DISEASE, ARTERIOSCLEROTIC, POST-STROKE: ICD-10-CM

## 2019-10-17 DIAGNOSIS — E78.00 HYPERCHOLESTEROLEMIA: ICD-10-CM

## 2019-10-17 DIAGNOSIS — C44.729 SCC (SQUAMOUS CELL CARCINOMA), LEG, LEFT: ICD-10-CM

## 2019-10-17 DIAGNOSIS — N18.4 BENIGN HYPERTENSION WITH CHRONIC KIDNEY DISEASE, STAGE IV (HCC): Primary | ICD-10-CM

## 2019-10-17 DIAGNOSIS — I67.2 CEREBROVASCULAR DISEASE, ARTERIOSCLEROTIC, POST-STROKE: ICD-10-CM

## 2019-10-17 NOTE — PROGRESS NOTES
Miracle Tran  Identified pt with two pt identifiers(name and ). Chief Complaint Patient presents with  Hypertension  Chronic Kidney Disease  Thyroid Problem Reviewed record In preparation for visit and have obtained necessary documentation. Has info on advanced directive but has not filled them out. 1. Have you been to the ER, urgent care clinic or hospitalized since your last visit? No  
 
2. Have you seen or consulted any other health care providers outside of the [address] [place] [address] since your last visit? Include any pap smears or colon screening. No 
 
Vitals reviewed with provider. Health Maintenance reviewed:  
 
Health Maintenance Due Topic  Shingrix Vaccine Age 50> (1 of 2) Wt Readings from Last 3 Encounters:  
10/17/19 123 lb (55.8 kg) 19 120 lb (54.4 kg) 19 125 lb 12.8 oz (57.1 kg) Temp Readings from Last 3 Encounters:  
10/17/19 95.3 °F (35.2 °C) (Temporal) 19 97.5 °F (36.4 °C) (Oral) 19 97.9 °F (36.6 °C) (Oral) BP Readings from Last 3 Encounters:  
10/17/19 132/72  
19 131/71  
19 151/67 Pulse Readings from Last 3 Encounters:  
10/17/19 76  
19 82  
19 81 Vitals:  
 10/17/19 1125 BP: 132/72 Pulse: 76 Resp: 12 Temp: 95.3 °F (35.2 °C) TempSrc: Temporal  
SpO2: 95% Weight: 123 lb (55.8 kg) Height: 5' 7\" (1.702 m) PainSc:   0 - No pain Learning Assessment:  
:  
 
 
Learning Assessment 2017 PRIMARY LEARNER Patient Patient HIGHEST LEVEL OF EDUCATION - PRIMARY LEARNER  DID NOT GRADUATE HIGH SCHOOL DID NOT GRADUATE HIGH SCHOOL  
BARRIERS PRIMARY LEARNER HEARING HEARING  
[address] CAREGIVER Yes Yes [address] [facility] William/Grandaughter Manuel Shutters Christian Thomas/Jamin -  
CO-LEARNER HIGHEST LEVEL OF EDUCATION 4 YEARS OF COLLEGE SOME COLLEGE  
BARRIERS CO-LEARNER - NONE PRIMARY LANGUAGE ENGLISH ENGLISH  
 PRIMARY LANGUAGE CO-LEARNER - ENGLISH  NEED - No  
LEARNER PREFERENCE PRIMARY LISTENING LISTENING  
LEARNER PREFERENCE CO-LEARNER - OTHER (COMMENT) LEARNING SPECIAL TOPICS - NA  
ANSWERED BY Patient patient RELATIONSHIP SELF SELF Depression Screening:  
:  
 
 
3 most recent PHQ Screens 2/28/2019 Little interest or pleasure in doing things Not at all Feeling down, depressed, irritable, or hopeless Several days Total Score PHQ 2 1 Trouble falling or staying asleep, or sleeping too much - Feeling tired or having little energy - Poor appetite, weight loss, or overeating - Feeling bad about yourself - or that you are a failure or have let yourself or your family down - Trouble concentrating on things such as school, work, reading, or watching TV - Moving or speaking so slowly that other people could have noticed; or the opposite being so fidgety that others notice - Thoughts of being better off dead, or hurting yourself in some way -  
PHQ 9 Score - How difficult have these problems made it for you to do your work, take care of your home and get along with others - Fall Risk Assessment:  
:  
 
 
Fall Risk Assessment, last 12 mths 10/17/2019 Able to walk? Yes Fall in past 12 months? No  
Fall with injury? -  
Number of falls in past 12 months - Fall Risk Score -  
  
 
Abuse Screening:  
:  
 
 
Abuse Screening Questionnaire 10/17/2019 7/13/2018 8/4/2017 8/30/2016 6/18/2015 6/18/2014 Do you ever feel afraid of your partner? N N N N N N Are you in a relationship with someone who physically or mentally threatens you? N N N N N N Is it safe for you to go home? Y Y Y Y Y Y  
  
 
ADL Screening:  
:  
 
 
ADL Assessment 12/1/2014 Feeding yourself No Help Needed Getting from bed to chair No Help Needed Getting dressed No Help Needed Bathing or showering No Help Needed Walk across the room (includes cane/walker) No Help Needed Using the telphone No Help Needed Taking your medications Help Needed Preparing meals No Help Needed Managing money (expenses/bills) No Help Needed Moderately strenuous housework (laundry) No Help Needed Shopping for personal items (toiletries/medicines) Help Needed Shopping for groceries Help Needed Driving Help Needed Climbing a flight of stairs No Help Needed Getting to places beyond walking distances No Help Needed

## 2019-10-18 LAB
BASOPHILS # BLD AUTO: 0 X10E3/UL (ref 0–0.2)
BASOPHILS NFR BLD AUTO: 1 %
BUN SERPL-MCNC: 40 MG/DL (ref 10–36)
BUN/CREAT SERPL: 28 (ref 12–28)
CALCIUM SERPL-MCNC: 9.7 MG/DL (ref 8.7–10.3)
CHLORIDE SERPL-SCNC: 100 MMOL/L (ref 96–106)
CO2 SERPL-SCNC: 28 MMOL/L (ref 20–29)
CREAT SERPL-MCNC: 1.41 MG/DL (ref 0.57–1)
EOSINOPHIL # BLD AUTO: 0.1 X10E3/UL (ref 0–0.4)
EOSINOPHIL NFR BLD AUTO: 2 %
ERYTHROCYTE [DISTWIDTH] IN BLOOD BY AUTOMATED COUNT: 12.6 % (ref 12.3–15.4)
GLUCOSE SERPL-MCNC: 76 MG/DL (ref 65–99)
HCT VFR BLD AUTO: 37.5 % (ref 34–46.6)
HGB BLD-MCNC: 12.5 G/DL (ref 11.1–15.9)
IMM GRANULOCYTES # BLD AUTO: 0 X10E3/UL (ref 0–0.1)
IMM GRANULOCYTES NFR BLD AUTO: 1 %
LYMPHOCYTES # BLD AUTO: 1.3 X10E3/UL (ref 0.7–3.1)
LYMPHOCYTES NFR BLD AUTO: 20 %
MCH RBC QN AUTO: 30.8 PG (ref 26.6–33)
MCHC RBC AUTO-ENTMCNC: 33.3 G/DL (ref 31.5–35.7)
MCV RBC AUTO: 92 FL (ref 79–97)
MONOCYTES # BLD AUTO: 0.7 X10E3/UL (ref 0.1–0.9)
MONOCYTES NFR BLD AUTO: 11 %
NEUTROPHILS # BLD AUTO: 4.4 X10E3/UL (ref 1.4–7)
NEUTROPHILS NFR BLD AUTO: 65 %
PLATELET # BLD AUTO: 261 X10E3/UL (ref 150–450)
POTASSIUM SERPL-SCNC: 4.5 MMOL/L (ref 3.5–5.2)
RBC # BLD AUTO: 4.06 X10E6/UL (ref 3.77–5.28)
SODIUM SERPL-SCNC: 143 MMOL/L (ref 134–144)
T4 FREE SERPL-MCNC: 1.22 NG/DL (ref 0.82–1.77)
TSH SERPL DL<=0.005 MIU/L-ACNC: 6.8 UIU/ML (ref 0.45–4.5)
WBC # BLD AUTO: 6.6 X10E3/UL (ref 3.4–10.8)

## 2019-10-18 NOTE — PROGRESS NOTES
Thyroid is a little on the low side. I suspect may not be taking medication every day. We will repeat at next OV. Blood counts are normal. Kidney test (creatinine) is stable.

## 2019-10-23 NOTE — PROGRESS NOTES
Verified two patient identifiers, name and . Pt's granddaughter Re Andrew notified of results, no further questions at this time.

## 2019-10-24 ENCOUNTER — HOSPITAL ENCOUNTER (OUTPATIENT)
Dept: LAB | Age: 84
Discharge: HOME | End: 2019-10-24

## 2019-10-24 ENCOUNTER — OFFICE VISIT (OUTPATIENT)
Dept: DERMATOLOGY | Facility: AMBULATORY SURGERY CENTER | Age: 84
End: 2019-10-24

## 2019-10-24 VITALS
WEIGHT: 123 LBS | TEMPERATURE: 97.9 F | BODY MASS INDEX: 19.3 KG/M2 | HEART RATE: 73 BPM | SYSTOLIC BLOOD PRESSURE: 138 MMHG | HEIGHT: 67 IN | RESPIRATION RATE: 16 BRPM | DIASTOLIC BLOOD PRESSURE: 64 MMHG | OXYGEN SATURATION: 96 %

## 2019-10-24 DIAGNOSIS — D48.5 NEOPLASM OF UNCERTAIN BEHAVIOR OF SKIN OF LOWER LEG: Primary | ICD-10-CM

## 2019-10-24 NOTE — PROGRESS NOTES
Room: 6    Identified pt with two pt identifiers(name and ). Reviewed record in preparation for visit and have obtained necessary documentation. All patient medications has been reviewed. Chief Complaint   Patient presents with    Skin Exam       Health Maintenance Due   Topic    Shingrix Vaccine Age 50> (1 of 2)       Vitals:    10/24/19 1307   BP: 138/64   Pulse: 73   Resp: 16   Temp: 97.9 °F (36.6 °C)   TempSrc: Oral   SpO2: 96%   Weight: 55.8 kg (123 lb)   Height: 5' 7\" (1.702 m)   PainSc:   0 - No pain       1. Have you been to the ER, urgent care clinic since your last visit? Hospitalized since your last visit? No    2. Have you seen or consulted any other health care providers outside of the [address] [place] [address] since your last visit? Include any pap smears or colon screening. No    Patient is accompanied by self I have received verbal consent from Tiana Driscoll to discuss any/all medical information while they are present in the room.

## 2019-10-24 NOTE — PROGRESS NOTES
Written by Denia Currie, as dictated by Haydee Gan, [address]. Name: Dennis Corrales       Age: 80 y.o. Date: 10/24/2019    Chief Complaint:   Chief Complaint   Patient presents with    Skin Exam       Subjective:    HPI:  Ms.. Dennis Corrales is a 80 y.o. female who presents for the evaluation of a lesion on the left calf with her granddaughter. She states that the lesion appeared a couple of months ago. The patient is concerned because this lesion is in the same area as previous skin cancer that was removed with a shave bx 2 years ago. Biopsy showed Squamous Cell Carcinoma in situ at that time with margin evaluation showing clear margins. At that time, we decided not to pursue any further treatment. Associated symptoms include bothersome and concerning lesion due to increase in size. She is feeling well and in her usual state of health today. She has no current illnesses, no other skin concerns. Her allergies, medications, medical, and social history are reviewed by me today.     ROS: Consitutional: Negative  Dermatological : positive for - skin lesion changes    Social History     Socioeconomic History    Marital status:      Spouse name: Not on file    Number of children: Not on file    Years of education: Not on file    Highest education level: Not on file   Occupational History    Not on file   Social Needs    Financial resource strain: Not on file    Food insecurity:     Worry: Not on file     Inability: Not on file    Transportation needs:     Medical: Not on file     Non-medical: Not on file   Tobacco Use    Smoking status: Never Smoker    Smokeless tobacco: Never Used   Substance and Sexual Activity    Alcohol use: No    Drug use: No    Sexual activity: Not on file   Lifestyle    Physical activity:     Days per week: Not on file     Minutes per session: Not on file    Stress: Not on file   Relationships    Social connections:     Talks on phone: Not on file Gets together: Not on file     Attends Sikh service: Not on file     Active member of club or organization: Not on file     Attends meetings of clubs or organizations: Not on file     Relationship status: Not on file    Intimate partner violence:     Fear of current or ex partner: Not on file     Emotionally abused: Not on file     Physically abused: Not on file     Forced sexual activity: Not on file   Other Topics Concern    Not on file   Social History Narrative    Not on file       Family History   Problem Relation Age of Onset    Cancer Father        Past Medical History:   Diagnosis Date    Chronic kidney disease     CKD (chronic kidney disease) stage 3, GFR 30-59 ml/min ([facility] [address] 5/17/2010    Hypercholesteremia     Hypertension     Post herpetic neuralgia     Stroke ([facility] [address]     Thyroid disease        Past Surgical History:   Procedure Laterality Date    HX APPENDECTOMY      HX HYSTERECTOMY  1947    one ovary out       Current Outpatient Medications   Medication Sig Dispense Refill    multivit-min-FA-lycopen-lutein (CENTRUM SILVER) 0.4-300-250 mg-mcg-mcg tab Take  by mouth.  simvastatin (ZOCOR) 20 mg tablet TAKE ONE TABLET BY MOUTH NIGHTLY 30 Tab 3    levothyroxine (SYNTHROID) 50 mcg tablet TAKE 2 TABLETS BY MOUTH ON SATURDAY AND SUNDAY AND TAKE 1 TABLET BY MOUTH THE OTHER 5 DAYS 40 Tab 11    hydroCHLOROthiazide (MICROZIDE) 12.5 mg capsule TAKE 1 CAPSULE BY MOUTH ONCE DAILY 90 Cap 1    nortriptyline (PAMELOR) 10 mg capsule TAKE 1 TO 2 CAPSULES BY MOUTH AT BEDTIME FOR  SHINGLES  PAIN 60 Cap 5    vit C,U-Ry-xkaam-lutein-zeaxan (PRESERVISION AREDS-2) 361-662-71-1 mg-unit-mg-mg cap capsule Take 1 Cap by mouth two (2) times daily (with meals).  aspirin delayed-release 81 mg tablet Take 1 Tab by mouth daily. 90 Tab 3    polyethylene glycol (MIRALAX) 17 gram/dose powder Take 17 g by mouth two (2) times a day.  1 tablespoon with 8 oz of water daily 510 g 0    acetaminophen (TYLENOL EXTRA STRENGTH) 500 mg tablet Take  by mouth every six (6) hours as needed. Allergies   Allergen Reactions    Lyrica [Pregabalin] Other (comments)     Couldn't function    Macrobid [Nitrofurantoin Monohyd/M-Cryst] Other (comments)     sick    Neurontin [Gabapentin] Other (comments)     groggy         Objective:    Visit Vitals  /64 (BP 1 Location: Left arm, BP Patient Position: Sitting)   Pulse 73   Temp 97.9 °F (36.6 °C) (Oral)   Resp 16   Ht 5' 7\" (1.702 m)   Wt 123 lb (55.8 kg)   SpO2 96%   BMI 19.26 kg/m²       Dennis Corrales is a 80 y.o. female who appears well and in no distress. She is awake, alert, and oriented. A limited skin examination was completed including the left calf. There is a 18 x 8 mm keratotic papule on the left calf concerning for SCC, including the lesion of her concern. This is located a the posterior aspect of a flat white scar. Photos from today's visit:           Left calf    Assessment/Plan:  Neoplasm of Uncertain Behavior, left calf, favors SCC, recurrent. The differential diagnoses were discussed. Excision was advised for removal of this lesion on the left calf. The procedure was reviewed and verbal and written consent were obtained. The risks of pain, bleeding, infection, recurrence of the lesion, and scar were discussed. I performed the procedure. The site was cleansed and anesthetized with 1% Lidocaine with Epinephrine 1:100,000. The lesion was excised with a 3 mm margin in an elliptical manner to a depth of subcutis. Electrocoagulation was used for hemostasis. A two layer closure was performed after the excision using 4-0 monocryl sutures for deep tissue (subcutis and dermis) approximation to reduce tension on the wound and 4-0 prolene sutures for skin edge approximation. The closure length was 36 mm. The wound was bandaged and care reviewed. The specimen was sent to pathology.   I will contact the patient with the results and any further treatment that may be necessary. She will return in 2 weeks for suture removal.     This plan was reviewed with the patient and patient agrees. All questions were answered. This scribe documentation was reviewed by me and accurately reflects the examination and decisions made by me. [facility]   OFFICE PROCEDURE PROGRESS NOTE   Chart reviewed for the following:   Alvarado BELTRÁN, have reviewed the History, Physical and updated the Allergic reactions for Jacquelyn Dolin. TIME OUT performed immediately prior to start of procedure:   Randi BELTRÁN, have performed the following reviews on Jacquelyn Dolin   prior to the start of the procedure:     * Patient was identified by name and date of birth   * Agreement on procedure being performed was verified   * Risks and Benefits explained to the patient   * Procedure site verified and marked as necessary   * Patient was positioned for comfort   * Consent was signed and verified     Time: 1:40 PM  Date of procedure: 10/24/2019  Procedure performed by: Josue Costello  Provider assisted by: Divina Guy LPN  Patient assisted by: self   How tolerated by patient: tolerated the procedure well with no complications   Comments: none

## 2019-10-29 NOTE — PROGRESS NOTES
LM with grandson about negative margins and path showing SCCi arising in [place]. No further tx is needed. He states the patient is doing well and they have been helping her change the bandages.

## 2019-11-06 ENCOUNTER — OFFICE VISIT (OUTPATIENT)
Dept: DERMATOLOGY | Facility: AMBULATORY SURGERY CENTER | Age: 84
End: 2019-11-06

## 2019-11-06 VITALS
RESPIRATION RATE: 12 BRPM | HEIGHT: 67 IN | WEIGHT: 125 LBS | BODY MASS INDEX: 19.62 KG/M2 | TEMPERATURE: 97.7 F | OXYGEN SATURATION: 99 % | HEART RATE: 61 BPM

## 2019-11-06 DIAGNOSIS — Z48.02 ENCOUNTER FOR REMOVAL OF SUTURES: Primary | ICD-10-CM

## 2019-11-06 NOTE — PROGRESS NOTES
Written by Pauline Bocanegra, as dictated by Noemy Mancia, [address]. Name: Alejandra Bailey       Age: 80 y.o. Date: 11/6/2019    Chief Complaint:   Chief Complaint   Patient presents with    Skin Exam     suture removal       Subjective:    HPI:  Ms.. Alejandra Bailey is a 80 y.o. female who presents for removal of sutures on the left shin. She states that the area has healed well and she has been changing the bandaid appropriately. She is feeling well and in her usual state of health today. She has no current illnesses, no other skin concerns. Her allergies, medications, medical, and social history are reviewed by me today.     ROS: Consitutional: Negative  Dermatological : negative    Social History     Socioeconomic History    Marital status:      Spouse name: Not on file    Number of children: Not on file    Years of education: Not on file    Highest education level: Not on file   Occupational History    Not on file   Social Needs    Financial resource strain: Not on file    Food insecurity:     Worry: Not on file     Inability: Not on file    Transportation needs:     Medical: Not on file     Non-medical: Not on file   Tobacco Use    Smoking status: Never Smoker    Smokeless tobacco: Never Used   Substance and Sexual Activity    Alcohol use: No    Drug use: No    Sexual activity: Not on file   Lifestyle    Physical activity:     Days per week: Not on file     Minutes per session: Not on file    Stress: Not on file   Relationships    Social connections:     Talks on phone: Not on file     Gets together: Not on file     Attends [employer] service: Not on file     Active member of club or organization: Not on file     Attends meetings of clubs or organizations: Not on file     Relationship status: Not on file    Intimate partner violence:     Fear of current or ex partner: Not on file     Emotionally abused: Not on file     Physically abused: Not on file     Forced sexual activity: Not on file   Other Topics Concern    Not on file   Social History Narrative    Not on file       Family History   Problem Relation Age of Onset    Cancer Father        Past Medical History:   Diagnosis Date    Chronic kidney disease     CKD (chronic kidney disease) stage 3, GFR 30-59 ml/min ([facility] [address] 5/17/2010    Hypercholesteremia     Hypertension     Post herpetic neuralgia     Stroke ([facility] [address]     Thyroid disease        Past Surgical History:   Procedure Laterality Date    HX APPENDECTOMY      HX HYSTERECTOMY  1947    one ovary out       Current Outpatient Medications   Medication Sig Dispense Refill    multivit-min-FA-lycopen-lutein (CENTRUM SILVER) 0.4-300-250 mg-mcg-mcg tab Take  by mouth.  simvastatin (ZOCOR) 20 mg tablet TAKE ONE TABLET BY MOUTH NIGHTLY 30 Tab 3    levothyroxine (SYNTHROID) 50 mcg tablet TAKE 2 TABLETS BY MOUTH ON SATURDAY AND SUNDAY AND TAKE 1 TABLET BY MOUTH THE OTHER 5 DAYS 40 Tab 11    hydroCHLOROthiazide (MICROZIDE) 12.5 mg capsule TAKE 1 CAPSULE BY MOUTH ONCE DAILY 90 Cap 1    nortriptyline (PAMELOR) 10 mg capsule TAKE 1 TO 2 CAPSULES BY MOUTH AT BEDTIME FOR  SHINGLES  PAIN 60 Cap 5    vit C,R-Ah-qcxjr-lutein-zeaxan (PRESERVISION AREDS-2) 089-700-75-1 mg-unit-mg-mg cap capsule Take 1 Cap by mouth two (2) times daily (with meals).  aspirin delayed-release 81 mg tablet Take 1 Tab by mouth daily. 90 Tab 3    polyethylene glycol (MIRALAX) 17 gram/dose powder Take 17 g by mouth two (2) times a day. 1 tablespoon with 8 oz of water daily 510 g 0    acetaminophen (TYLENOL EXTRA STRENGTH) 500 mg tablet Take  by mouth every six (6) hours as needed.          Allergies   Allergen Reactions    Lyrica [Pregabalin] Other (comments)     Couldn't function    Macrobid [Nitrofurantoin Monohyd/M-Cryst] Other (comments)     sick    Neurontin [Gabapentin] Other (comments)     groggy         Objective:    Visit Vitals  Pulse 61   Temp 97.7 °F (36.5 °C) (Oral)   Resp 12   Ht 5' 7\" (1.702 m)   Wt 125 lb (56.7 kg)   SpO2 99%   BMI 19.58 kg/m²       Sheela Alex is a 80 y.o. female who appears well and in no distress. She is awake, alert, and oriented. A limited skin examination was completed including the left shin. There is a well healed scar on the left lateral shin with mild erythema consistent with inflammation and intact sutures. No evidence of infection. Assessment/Plan:  Encounter for removal of sutures. Sutures removed without difficulty. Patient tolerated well. D/w pt and granddaughter - follow up prn, if new concerns arise or as advised by PCP. They agree to this plan. This plan was reviewed with the patient and patient agrees. All questions were answered. This scribe documentation was reviewed by me and accurately reflects the examination and decisions made by me.

## 2019-11-06 NOTE — PROGRESS NOTES
Amrit Tran is a 80 y.o. female     Chief Complaint   Patient presents with    Skin Exam     suture removal       Visit Vitals  Pulse 61   Temp 97.7 °F (36.5 °C) (Oral)   Resp 12   Ht 5' 7\" (1.702 m)   Wt 125 lb (56.7 kg)   SpO2 99%   BMI 19.58 kg/m²       Health Maintenance Due   Topic Date Due    Shingrix Vaccine Age 50> (1 of 2) [birth year]/1962       1. Have you been to the ER, urgent care clinic since your last visit? Hospitalized since your last visit? No    2. Have you seen or consulted any other health care providers outside of the [address] [place] [address] since your last visit? Include any pap smears or colon screening.  No

## 2020-01-01 ENCOUNTER — TELEPHONE (OUTPATIENT)
Dept: INTERNAL MEDICINE CLINIC | Age: 85
End: 2020-01-01

## 2020-01-01 DIAGNOSIS — N30.00 ACUTE CYSTITIS WITHOUT HEMATURIA: Primary | ICD-10-CM

## 2020-01-01 RX ORDER — CEPHALEXIN 250 MG/1
250 CAPSULE ORAL 3 TIMES DAILY
Qty: 21 CAP | Refills: 0 | Status: SHIPPED | OUTPATIENT
Start: 2020-01-01 | End: 2021-01-01

## 2020-02-04 ENCOUNTER — OFFICE VISIT (OUTPATIENT)
Dept: URGENT CARE | Age: 85
End: 2020-02-04

## 2020-02-04 VITALS
DIASTOLIC BLOOD PRESSURE: 66 MMHG | HEART RATE: 84 BPM | WEIGHT: 125 LBS | HEIGHT: 67 IN | BODY MASS INDEX: 19.62 KG/M2 | SYSTOLIC BLOOD PRESSURE: 154 MMHG | OXYGEN SATURATION: 99 % | RESPIRATION RATE: 16 BRPM | TEMPERATURE: 98.6 F

## 2020-02-04 DIAGNOSIS — S92.414A CLOSED NONDISPLACED FRACTURE OF PROXIMAL PHALANX OF RIGHT GREAT TOE, INITIAL ENCOUNTER: Primary | ICD-10-CM

## 2020-02-04 NOTE — PROGRESS NOTES
Jazmine Lopez is a 80 y.o. female who sustained a right foot injury last night. Mechanism of injury: 1/2 gallon carton of frozen milk fell on great toe. Immediate symptoms: immediate pain, immediate swelling, difficulty to bear weight directly after injury. Symptoms have been constant since that time. The history is provided by the patient.         Past Medical History:   Diagnosis Date    Chronic kidney disease     CKD (chronic kidney disease) stage 3, GFR 30-59 ml/min ([facility] 5/17/2010    Hypercholesteremia     Hypertension     Post herpetic neuralgia     Stroke ([facility] [address]     Thyroid disease         Past Surgical History:   Procedure Laterality Date    HX APPENDECTOMY      HX HYSTERECTOMY  1947    one ovary out         Family History   Problem Relation Age of Onset    Cancer Father         Social History     Socioeconomic History    Marital status:      Spouse name: Not on file    Number of children: Not on file    Years of education: Not on file    Highest education level: Not on file   Occupational History    Not on file   Social Needs    Financial resource strain: Not on file    Food insecurity:     Worry: Not on file     Inability: Not on file    Transportation needs:     Medical: Not on file     Non-medical: Not on file   Tobacco Use    Smoking status: Never Smoker    Smokeless tobacco: Never Used   Substance and Sexual Activity    Alcohol use: No    Drug use: No    Sexual activity: Not on file   Lifestyle    Physical activity:     Days per week: Not on file     Minutes per session: Not on file    Stress: Not on file   Relationships    Social connections:     Talks on phone: Not on file     Gets together: Not on file     Attends [employer] service: Not on file     Active member of club or organization: Not on file     Attends meetings of clubs or organizations: Not on file     Relationship status: Not on file    Intimate partner violence:     Fear of current or ex partner: Not on file     Emotionally abused: Not on file     Physically abused: Not on file     Forced sexual activity: Not on file   Other Topics Concern    Not on file   Social History Narrative    Not on file                ALLERGIES: Lyrica [pregabalin]; Macrobid [nitrofurantoin monohyd/m-cryst]; and Neurontin [gabapentin]    Review of Systems   Musculoskeletal: Positive for arthralgias, gait problem and joint swelling. Neurological: Negative for weakness and numbness. Vitals:    02/04/20 1431   BP: 154/66   Pulse: 84   Resp: 16   Temp: 98.6 °F (37 °C)   SpO2: 99%   Weight: 125 lb (56.7 kg)   Height: 5' 7\" (1.702 m)       Physical Exam  Vitals signs and nursing note reviewed. Constitutional:       General: She is not in acute distress. Appearance: She is well-developed. She is not diaphoretic. Musculoskeletal:      Right foot: Decreased range of motion. Normal capillary refill. Tenderness, bony tenderness and swelling present. No crepitus, deformity or laceration. Feet:    Neurological:      Mental Status: She is alert. Psychiatric:         Behavior: Behavior normal.         Thought Content: Thought content normal.         Judgment: Judgment normal.         MDM    ICD-10-CM ICD-9-CM   1. Closed nondisplaced fracture of proximal phalanx of right great toe, initial encounter S92.414A 826.0       Orders Placed This Encounter    XR FOOT RT MIN 3 V     Standing Status:   Future     Number of Occurrences:   1     Standing Expiration Date:   3/4/2021      Shawn tape  Elevate  Ice  The patient is to follow up with Ortho. If signs and symptoms become worse the pt is to go to the ER. XR Results (most recent):  Results from Appointment encounter on 02/04/20   XR FOOT RT MIN 3 V    Narrative EXAM: XR FOOT RT MIN 3 V    INDICATION: Right foot pain    COMPARISON: None.     FINDINGS: Three views of the right foot demonstrate an acute T-shaped  intra-articular impacted fracture through the first proximal phalanx at the IP  joint. . There is diffuse osteopenia. Impression IMPRESSION: Acute right first proximal phalangeal intra-articular fracture with  impaction. Osteopenia.        Procedures

## 2020-02-04 NOTE — PATIENT INSTRUCTIONS
Buddy tape  Follow up with Ortho  Tylenol as needed     Broken Toe: Care Instructions  Your Care Instructions  You have broken (fractured) a bone in your toe. This kind of fracture does not need a special cast or brace. \"Buddy-taping\" your broken toe to a healthy toe next to it is almost always enough to treat the problem and ease symptoms. The toe may take 4 weeks or more to heal.  You heal best when you take good care of yourself. Eat a variety of healthy foods, and don't smoke. Follow-up care is a key part of your treatment and safety. Be sure to make and go to all appointments, and call your doctor if you are having problems. It's also a good idea to know your test results and keep a list of the medicines you take. How can you care for yourself at home? · Be safe with medicines. Take pain medicines exactly as directed. ? If the doctor gave you a prescription medicine for pain, take it as prescribed. ? If you are not taking a prescription pain medicine, ask your doctor if you can take an over-the-counter medicine. · If your toe is taped to the toe next to it, your doctor has shown you how to change the tape. Protect the skin by putting something soft, such as felt or foam, between your toes before you tape them together. Never tape the toes together skin-to-skin. Your broken toe may need to be buddy-taped for 2 to 4 weeks to heal.  · Rest and protect your toe. Do not walk on it until you can do so without too much pain. If the doctor has told you to use crutches, use them as instructed. · Put ice or a cold pack on your toe for 10 to 20 minutes at a time. Try to do this every 1 to 2 hours for the next 3 days (when you are awake) or until the swelling goes down. Put a thin cloth between the ice and your skin. · Prop up your foot on a pillow when you ice it or anytime you sit or lie down. Try to keep it above the level of your heart. This will help reduce swelling.   · Make sure you go to your follow-up appointments. Your doctor will need to check that your toe is healing right. When should you call for help? Call your doctor now or seek immediate medical care if:    · You have severe pain.     · Your toe is cool or pale or changes color.     · You have tingling, weakness, or numbness in your toe.    Watch closely for changes in your health, and be sure to contact your doctor if:    · Pain and swelling get worse.     · You are not getting better as expected. Where can you learn more? Go to http://travis-benita.info/. Enter K625 in the search box to learn more about \"Broken Toe: Care Instructions. \"  Current as of: June 26, 2019  Content Version: 12.2  © 0550-4545 [employer]. Care instructions adapted under license by [employer] (which disclaims liability or warranty for this information). If you have questions about a medical condition or this instruction, always ask your healthcare professional. [address] any warranty or liability for your use of this information.

## 2020-02-05 ENCOUNTER — TELEPHONE (OUTPATIENT)
Dept: INTERNAL MEDICINE CLINIC | Facility: CLINIC | Age: 85
End: 2020-02-05

## 2020-02-05 NOTE — TELEPHONE ENCOUNTER
Juanpablo, pt's granddaughter, called to report that pt was seen yesterday at Urgent Care for a broken toe. Pt is seeking a referral to an orthopedic provider that can take a look at it. Please call Juanpablo back at [phone] to discuss options.

## 2020-02-05 NOTE — TELEPHONE ENCOUNTER
She was seen at [facility] yesterday and x-ray showed a broken toe, wanted number for ortho. Given number to [facility], no further questions.

## 2020-02-06 ENCOUNTER — OFFICE VISIT (OUTPATIENT)
Dept: ORTHOPEDIC SURGERY | Age: 85
End: 2020-02-06

## 2020-02-06 VITALS
HEIGHT: 67 IN | DIASTOLIC BLOOD PRESSURE: 72 MMHG | OXYGEN SATURATION: 98 % | TEMPERATURE: 97.5 F | WEIGHT: 122.6 LBS | RESPIRATION RATE: 22 BRPM | SYSTOLIC BLOOD PRESSURE: 158 MMHG | BODY MASS INDEX: 19.24 KG/M2 | HEART RATE: 91 BPM

## 2020-02-06 DIAGNOSIS — S92.412A CLOSED FRACTURE OF PROXIMAL PHALANX OF LEFT GREAT TOE, INITIAL ENCOUNTER: Primary | ICD-10-CM

## 2020-02-06 NOTE — PROGRESS NOTES
Alexander Barrera is a 80 y.o. female Chief Complaint Patient presents with  New Patient  Foot Injury  
  toe fracture Vitals:  
 02/06/20 1101 02/06/20 1107 BP: 178/61 158/72 BP 1 Location: Right arm Right arm BP Patient Position: Sitting Sitting Pulse: 89 91 Resp: 22 Temp: 97.5 °F (36.4 °C) TempSrc: Oral   
SpO2: 98% Weight: 122 lb 9.6 oz (55.6 kg) Height: 5' 7\" (1.702 m) 1. Have you been to the ER, urgent care clinic since your last visit? Hospitalized since your last visit? [facility]  2-4-20. Pt opened the freezer, half gallon of frozen milk fell out and landed on right great toe. X ray revealed fracture of great toe.

## 2020-02-06 NOTE — PROGRESS NOTES
2/6/2020 CC: left big toe pain HPI:      This is a 8 y.o. year old female who dropped frozen milk on the tip of her left foot. She did have pain, but no open wounds. PMH: 
Past Medical History:  
Diagnosis Date  Chronic kidney disease  CKD (chronic kidney disease) stage 3, GFR 30-59 ml/min ([facility] 5/17/2010  Hypercholesteremia  Hypertension  Post herpetic neuralgia  Stroke ([facility] [address]  Thyroid disease PSxHx: 
Past Surgical History:  
Procedure Laterality Date  HX APPENDECTOMY  HX HYSTERECTOMY  1947  
 one ovary out Meds: 
 
Current Outpatient Medications:  
  hydroCHLOROthiazide (MICROZIDE) 12.5 mg capsule, TAKE 1 CAPSULE BY MOUTH ONCE DAILY, Disp: 90 Cap, Rfl: 3 
  multivit-min-FA-lycopen-lutein (CENTRUM SILVER) 0.4-300-250 mg-mcg-mcg tab, Take  by mouth., Disp: , Rfl:  
  simvastatin (ZOCOR) 20 mg tablet, TAKE ONE TABLET BY MOUTH NIGHTLY, Disp: 30 Tab, Rfl: 3 
  levothyroxine (SYNTHROID) 50 mcg tablet, TAKE 2 TABLETS BY MOUTH ON SATURDAY AND SUNDAY AND TAKE 1 TABLET BY MOUTH THE OTHER 5 DAYS, Disp: 40 Tab, Rfl: 11 
  nortriptyline (PAMELOR) 10 mg capsule, TAKE 1 TO 2 CAPSULES BY MOUTH AT BEDTIME FOR  SHINGLES  PAIN, Disp: 60 Cap, Rfl: 5 
  vit C,K-Cw-wlmme-lutein-zeaxan (PRESERVISION AREDS-2) 904-095-36-1 mg-unit-mg-mg cap capsule, Take 1 Cap by mouth two (2) times daily (with meals). , Disp: , Rfl:  
  aspirin delayed-release 81 mg tablet, Take 1 Tab by mouth daily. , Disp: 90 Tab, Rfl: 3 
  polyethylene glycol (MIRALAX) 17 gram/dose powder, Take 17 g by mouth two (2) times a day. 1 tablespoon with 8 oz of water daily, Disp: 510 g, Rfl: 0 
  acetaminophen (TYLENOL EXTRA STRENGTH) 500 mg tablet, Take  by mouth every six (6) hours as needed. , Disp: , Rfl:  
 
All: 
Allergies Allergen Reactions  Lyrica [Pregabalin] Other (comments) Couldn't function  Macrobid [Nitrofurantoin Monohyd/M-Cryst] Other (comments)  
  sick  Neurontin [Gabapentin] Other (comments) groggy Social Hx: 
Social History Socioeconomic History  Marital status:  Spouse name: Not on file  Number of children: Not on file  Years of education: Not on file  Highest education level: Not on file Tobacco Use  Smoking status: Never Smoker  Smokeless tobacco: Never Used Substance and Sexual Activity  Alcohol use: No  
 Drug use: No  
 
 
Family Hx: 
Family History Problem Relation Age of Onset  Cancer Father Review of Systems:  
 
 
General: Denies headache, lethargy, fever, weight loss Ears/Nose/Throat: Denies ear discharge, drainage, nosebleeds, hoarse voice, dental problems Cardiovascular: Denies chest pain, shortness of breath Lungs: Denies chest pain, breathing problems, wheezing, pneumonia Stomach: Denies stomach pain, heartburn, constipation, irritable bowel Skin: Denies rash, sores, open wounds Musculoskeletal: left great toe pain Genitourinary: Denies dysuria, hematuria, polyuria Gastrointestinal: Denies constipation, obstipation, diarrhea Neurological: Denies changes in sight, smell, hearing, taste, seizures. Denies loss of consciousness. Psychiatric: Denies depression, sleep pattern changes, anxiety, change in personality Endocrine: Denies mood swings, heat or cold intolerance Hematologic/Lymphatic: Denies anemia, purpura, petechia Allergic/Immunologic: Denies swelling of throat, pain or swelling at lymph nodes Physical Examination: 
 
Visit Vitals /72 (BP 1 Location: Right arm, BP Patient Position: Sitting) Pulse 91 Temp 97.5 °F (36.4 °C) (Oral) Resp 22 Ht 5' 7\" (1.702 m) Wt 122 lb 9.6 oz (55.6 kg) SpO2 98% BMI 19.20 kg/m² General: AOX3, no apparent distress Psychiatric: mood and affect appropriate Lungs: breathing is symmetric and unlabored bilaterally Heart: regular rate and rhythm Abdomen: no guarding Head: normocephalic, atraumatic Skin: No significant abnormalities, good turgor Sensation intact to light touch: C5-T1 dermatomes Muscular exam: 5/5 strength in all major muscle groups unless noted in specialty exam. 
 
Extremities Right upper extremity: no exam 
Left upper extremity:  No exam 
Right lower extremity: No gross deformity. No restriction to range of motion of the hip, knee, ankle. Muscle bulk is appropriate without wasting. Sensation is intact to light touch in the L1-S1 dermatomes. Capillary refill is less than 2 seconds in the fingers. Strength testing is 5/5 at the major muscle groups of the hip, knee, ankle. Left lower extremity:  No gross deformity. No restriction to range of motion of the hip, knee, ankle. Muscle bulk is appropriate without wasting. Sensation is intact to light touch in the L1-S1 dermatomes. Capillary refill is less than 2 seconds in the fingers. Strength testing is 5/5 at the major muscle groups of the hip, knee, ankle. Diagnostics: 
 
Pertinent Diagnostics: Xrays of the left foot indicate an intra-articular, nondisplaced fracture of the proximal phalanx left great toe, otherwise, no fractures, osseus lesions, abnormalities, cartilage space is well maintained. Overall alignment is within normal limits, no effusion or other soft tissue abnormality. Assessment: Left proximal phalanx fracture, left great toe Plan: 
 
Plan for nonoperative management, ruthann taping and tylenol. She is comfortable in her shoes, and will transition to regular shoewear when able. Follow up in 3 weeks for a repeat clinical check. Ms. Leopoldo Carolina has a reminder for a \"due or due soon\" health maintenance. I have asked that she contact her primary care provider for follow-up on this health maintenance.

## 2020-02-18 ENCOUNTER — OFFICE VISIT (OUTPATIENT)
Dept: INTERNAL MEDICINE CLINIC | Facility: CLINIC | Age: 85
End: 2020-02-18

## 2020-02-18 VITALS
SYSTOLIC BLOOD PRESSURE: 131 MMHG | BODY MASS INDEX: 18.99 KG/M2 | HEART RATE: 77 BPM | RESPIRATION RATE: 12 BRPM | WEIGHT: 121 LBS | DIASTOLIC BLOOD PRESSURE: 53 MMHG | HEIGHT: 67 IN | OXYGEN SATURATION: 95 % | TEMPERATURE: 97.5 F

## 2020-02-18 DIAGNOSIS — I67.2 CEREBROVASCULAR DISEASE, ARTERIOSCLEROTIC, POST-STROKE: ICD-10-CM

## 2020-02-18 DIAGNOSIS — Z86.73 CEREBROVASCULAR DISEASE, ARTERIOSCLEROTIC, POST-STROKE: ICD-10-CM

## 2020-02-18 DIAGNOSIS — I49.9 IRREGULAR HEART BEAT: ICD-10-CM

## 2020-02-18 DIAGNOSIS — Z00.00 MEDICARE ANNUAL WELLNESS VISIT, SUBSEQUENT: Primary | ICD-10-CM

## 2020-02-18 DIAGNOSIS — E78.00 HYPERCHOLESTEROLEMIA: ICD-10-CM

## 2020-02-18 DIAGNOSIS — Z71.89 ADVANCE DIRECTIVE DISCUSSED WITH PATIENT: ICD-10-CM

## 2020-02-18 DIAGNOSIS — N18.4 BENIGN HYPERTENSION WITH CHRONIC KIDNEY DISEASE, STAGE IV (HCC): ICD-10-CM

## 2020-02-18 DIAGNOSIS — E03.4 HYPOTHYROIDISM DUE TO ACQUIRED ATROPHY OF THYROID: ICD-10-CM

## 2020-02-18 DIAGNOSIS — I12.9 BENIGN HYPERTENSION WITH CHRONIC KIDNEY DISEASE, STAGE IV (HCC): ICD-10-CM

## 2020-02-18 DIAGNOSIS — I73.9 ASYMPTOMATIC PVD (PERIPHERAL VASCULAR DISEASE) (HCC): ICD-10-CM

## 2020-02-18 NOTE — PROGRESS NOTES
HISTORY OF PRESENT ILLNESS Arielle Yeboah is a 80 y.o. female. HPI  She presents for follow up of hypertension, CKD, hyperlipidemia, peripheral vascular disease and history of prior stroke. Diet and Lifestyle: generally follows a low fat low cholesterol diet, generally follows a low sodium diet, sedentary, nonsmoker Medication compliance: compliant all of the time Medication side effects: none Home BP Monitoring: not done Cardiovascular ROS: 
She complains of dyspnea on exertion. She denies palpitations, orthopnea, exertional chest pressure/discomfort, claudication, lower extremity edema She presents for follow up of hypothyroidism. Patient denies weight changes, heat / cold intolerance, change in energy level   Course to date has been well controlled. Hurts all over, worse in the morning and imprroves after taking Tylenol. Patient Active Problem List  
Diagnosis Code  Postherpetic neuralgia B02.29  
 Cerebrovascular disease, arteriosclerotic, post-stroke I67.2, Z86.73  
 Asymptomatic PVD (peripheral vascular disease) ([facility]) I73.9  Hypercholesterolemia E78.00  Palpitations R00.2  Benign hypertension with chronic kidney disease, stage IV ([facility]) I12.9, N18.4  Hypothyroidism due to acquired atrophy of thyroid E03.4  Closed fracture of proximal phalanx of left great toe, initial encounter C48.160K Past Medical History:  
Diagnosis Date  Chronic kidney disease  CKD (chronic kidney disease) stage 3, GFR 30-59 ml/min ([facility]) 5/17/2010  Hypercholesteremia  Hypertension  Post herpetic neuralgia  Stroke ([facility] [address]  Thyroid disease Past Surgical History:  
Procedure Laterality Date  HX APPENDECTOMY  HX HYSTERECTOMY  1947  
 one ovary out Social History Socioeconomic History  Marital status:  Spouse name: Not on file  Number of children: Not on file  Years of education: Not on file  Highest education level: Not on file Tobacco Use  Smoking status: Never Smoker  Smokeless tobacco: Never Used Substance and Sexual Activity  Alcohol use: No  
 Drug use: No  
 
Family History Problem Relation Age of Onset  Cancer Father Allergies Allergen Reactions  Lyrica [Pregabalin] Other (comments) Couldn't function  Macrobid [Nitrofurantoin Monohyd/M-Cryst] Other (comments)  
  sick  Neurontin [Gabapentin] Other (comments) groggy Current Outpatient Medications Medication Sig Dispense Refill  hydroCHLOROthiazide (MICROZIDE) 12.5 mg capsule TAKE 1 CAPSULE BY MOUTH ONCE DAILY 90 Cap 3  
 multivit-min-FA-lycopen-lutein (CENTRUM SILVER) 0.4-300-250 mg-mcg-mcg tab Take  by mouth.  simvastatin (ZOCOR) 20 mg tablet TAKE ONE TABLET BY MOUTH NIGHTLY 30 Tab 3  
 levothyroxine (SYNTHROID) 50 mcg tablet TAKE 2 TABLETS BY MOUTH ON SATURDAY AND SUNDAY AND TAKE 1 TABLET BY MOUTH THE OTHER 5 DAYS 40 Tab 11  
 nortriptyline (PAMELOR) 10 mg capsule TAKE 1 TO 2 CAPSULES BY MOUTH AT BEDTIME FOR  SHINGLES  PAIN 60 Cap 5  
 vit C,X-Wg-isttn-lutein-zeaxan (PRESERVISION AREDS-2) 570-291-63-1 mg-unit-mg-mg cap capsule Take 1 Cap by mouth two (2) times daily (with meals).  aspirin delayed-release 81 mg tablet Take 1 Tab by mouth daily. 90 Tab 3  polyethylene glycol (MIRALAX) 17 gram/dose powder Take 17 g by mouth two (2) times a day. 1 tablespoon with 8 oz of water daily 510 g 0  
 acetaminophen (TYLENOL EXTRA STRENGTH) 500 mg tablet Take  by mouth every six (6) hours as needed. Review of Systems Constitutional: Negative for malaise/fatigue and weight loss. Gastrointestinal: Positive for diarrhea (since yesterday). Negative for constipation. Musculoskeletal: Positive for back pain, joint pain and neck pain. Neurological: Positive for dizziness (vertigo, transient). Negative for tingling and focal weakness. Visit Vitals /53 (BP 1 Location: Left arm, BP Patient Position: Sitting) Pulse 77 Temp 97.5 °F (36.4 °C) (Oral) Resp 12 Ht 5' 7\" (1.702 m) Wt 121 lb (54.9 kg) SpO2 95% BMI 18.95 kg/m² Physical Exam 
Vitals signs and nursing note reviewed. Constitutional:   
   Appearance: Normal appearance. She is well-developed. HENT:  
   Head: Normocephalic and atraumatic. Eyes:  
   Conjunctiva/sclera: Conjunctivae normal.  
   Pupils: Pupils are equal, round, and reactive to light. Neck: Musculoskeletal: Neck supple. Thyroid: No thyromegaly. Vascular: No carotid bruit. Cardiovascular:  
   Rate and Rhythm: Normal rate. Rhythm irregular. Chest Wall: PMI is not displaced. Pulses:     
     Dorsalis pedis pulses are 0 on the right side and 0 on the left side. Posterior tibial pulses are 0 on the right side and 0 on the left side. Heart sounds: Normal heart sounds. No murmur. No gallop. Pulmonary:  
   Effort: Pulmonary effort is normal.  
   Breath sounds: No wheezing, rhonchi or rales. Abdominal:  
   General: Bowel sounds are normal. There is no distension or abdominal bruit. Palpations: Abdomen is soft. There is no hepatomegaly, splenomegaly or mass. Tenderness: There is no abdominal tenderness. Musculoskeletal:  
   Right lower leg: No edema. Left lower leg: No edema. Lymphadenopathy:  
   Cervical: No cervical adenopathy. Upper Body:  
   Right upper body: No supraclavicular adenopathy. Left upper body: No supraclavicular adenopathy. Skin: 
   General: Skin is warm and dry. Findings: No rash. Neurological:  
   Mental Status: She is alert and oriented to person, place, and time. Sensory: No sensory deficit. Motor: Motor function is intact. Gait: Gait is intact.   
Psychiatric:     
   Attention and Perception: Attention normal.     
   Mood and Affect: Mood normal.     
   Speech: Speech normal.     
 Behavior: Behavior normal.  
 
EKG:  Sinus rhythm, frequent PAC's rate: 82  MS: 240 msec, QRS: 84 msec, QT: 404 msec,  QRS axis: -2 degrees, no ST/T changes. 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  
 Potassium 4.5 10/17/2019 12:07 PM  
 Chloride 100 10/17/2019 12:07 PM  
 CO2 28 10/17/2019 12:07 PM  
 Anion gap 7 10/28/2016 03:34 AM  
 Glucose 76 10/17/2019 12:07 PM  
 BUN 40 (H) 10/17/2019 12:07 PM  
 Creatinine 1.41 (H) 10/17/2019 12:07 PM  
 BUN/Creatinine ratio 28 10/17/2019 12:07 PM  
 GFR est AA 34 (L) 10/17/2019 12:07 PM  
 GFR est non-AA 29 (L) 10/17/2019 12:07 PM  
 Calcium 9.7 10/17/2019 12:07 PM  
 Bilirubin, total 0.3 01/26/2018 01:34 PM  
 AST (SGOT) 24 01/26/2018 01:34 PM  
 Alk. phosphatase 93 01/26/2018 01:34 PM  
 Protein, total 6.4 01/26/2018 01:34 PM  
 Albumin 3.8 01/26/2018 01:34 PM  
 Globulin 3.6 10/28/2016 03:34 AM  
 A-G Ratio 1.5 01/26/2018 01:34 PM  
 ALT (SGPT) 15 01/26/2018 01:34 PM  
 
Lab Results Component Value Date/Time Cholesterol, total 153 01/26/2018 01:34 PM  
 HDL Cholesterol 89 01/26/2018 01:34 PM  
 LDL,Direct 55 06/18/2015 03:43 PM  
 LDL, calculated 56 01/26/2018 01:34 PM  
 VLDL, calculated 8 01/26/2018 01:34 PM  
 Triglyceride 41 01/26/2018 01:34 PM  
 CHOL/HDL Ratio 2.1 11/10/2010 09:50 AM  
 
Lab Results Component Value Date/Time TSH 6.800 (H) 10/17/2019 12:07 PM  
 T4, Free 1.22 10/17/2019 12:07 PM  
 
Lab Results Component Value Date/Time WBC 6.6 10/17/2019 12:07 PM  
 WBC 5.6 06/27/2012 11:07 AM  
 HGB 12.5 10/17/2019 12:07 PM  
 HCT 37.5 10/17/2019 12:07 PM  
 PLATELET 135 89/74/1995 12:07 PM  
 MCV 92 10/17/2019 12:07 PM  
 
 
ASSESSMENT and PLAN 
  ICD-10-CM ICD-9-CM 1. Medicare annual wellness visit, subsequent Z00.00 V70.0 2. Advance directive discussed with patient Z71.89 V65.49   
3.  Benign hypertension with chronic kidney disease, stage IV ([facility]) I12.9 360.03 METABOLIC PANEL, BASIC  
 X99.9 585.4 4. Hypothyroidism due to acquired atrophy of thyroid E03.4 244.8 TSH AND FREE T4  
  246.8 5. Hypercholesterolemia E78.00 272.0 LIPID PANEL 6. Asymptomatic PVD (peripheral vascular disease) ([facility]) I73.9 443.9 7. Cerebrovascular disease, arteriosclerotic, post-stroke I67.2 437.0 Z86.73 V12.54   
8. Irregular heart beat I49.9 427.9 AMB POC EKG ROUTINE W/ 12 LEADS, INTER & REP Diagnoses and all orders for this visit: 
 
1. Medicare annual wellness visit, subsequent 2. Advance directive discussed with patient 3. Benign hypertension with chronic kidney disease, stage IV ([facility] [address] Hypertension is controlled and creat is stable. -     METABOLIC PANEL, BASIC; Future 4. Hypothyroidism due to acquired atrophy of thyroid Euthyroid on replacement.  
-     TSH AND FREE T4; Future 5. Hypercholesterolemia Hyperlipidemia is controlled -     LIPID PANEL; Future 6. Asymptomatic PVD (peripheral vascular disease) ([facility] [address] Stable taking antiplatelet agent 7. Cerebrovascular disease, arteriosclerotic, post-stroke Stable taking antiplatelet agent 8. Irregular heart beat PAC's -     AMB POC EKG ROUTINE W/ 12 LEADS, INTER & REP Follow-up and Dispositions · Return in about 4 months (around 6/18/2020) for HTN, CKD, chol, thyroid  Fasting lab one week prior . lab results and schedule of future lab studies reviewed with patient 
reviewed diet, exercise and weight control I have discussed the diagnosis, evaluation and treatment options and the intended plan with the patient. Patient understands and is in agreement. The patient has received an after-visit summary and questions were answered concerning future plans. I have discussed side effects and warnings of any new medications with the patient as well.

## 2020-02-18 NOTE — ACP (ADVANCE CARE PLANNING)
Spoke to patient and clarified hep b schedule. Pt needs his last given 6 months from first dose. Pt is not due until next month Pt will schedule appt. With patient's permission advance care planning discussed. Health Care Agent would be granddaughter Xiomara Alvarado. [address] would be grandabeba Cee. She wants no life prolonging treatment if death is imminent. She wants life prolonging treatment for 2 weeks looking for improvement if there is overwhelming, permanent neurologic injury. She has done this with , but has not brought us a copy. Conversation took 4 minutes.

## 2020-02-18 NOTE — PROGRESS NOTES
This is the Subsequent Medicare Annual Wellness Exam, performed 12 months or more after the Initial AWV or the last Subsequent AWV I have reviewed the patient's medical history in detail and updated the computerized patient record. History Patient Active Problem List  
Diagnosis Code  Postherpetic neuralgia B02.29  
 Cerebrovascular disease, arteriosclerotic, post-stroke I67.2, Z86.73  
 Asymptomatic PVD (peripheral vascular disease) ([facility]) I73.9  Hypercholesterolemia E78.00  Palpitations R00.2  Benign hypertension with chronic kidney disease, stage IV ([facility]) I12.9, N18.4  Hypothyroidism due to acquired atrophy of thyroid E03.4  Closed fracture of proximal phalanx of left great toe, initial encounter S33.833M Past Medical History:  
Diagnosis Date  Chronic kidney disease  CKD (chronic kidney disease) stage 3, GFR 30-59 ml/min ([facility]) 5/17/2010  Hypercholesteremia  Hypertension  Post herpetic neuralgia  Stroke ([facility] [address]  Thyroid disease Past Surgical History:  
Procedure Laterality Date  HX APPENDECTOMY  HX HYSTERECTOMY  1947  
 one ovary out Current Outpatient Medications Medication Sig Dispense Refill  hydroCHLOROthiazide (MICROZIDE) 12.5 mg capsule TAKE 1 CAPSULE BY MOUTH ONCE DAILY 90 Cap 3  
 multivit-min-FA-lycopen-lutein (CENTRUM SILVER) 0.4-300-250 mg-mcg-mcg tab Take  by mouth.  simvastatin (ZOCOR) 20 mg tablet TAKE ONE TABLET BY MOUTH NIGHTLY 30 Tab 3  
 levothyroxine (SYNTHROID) 50 mcg tablet TAKE 2 TABLETS BY MOUTH ON SATURDAY AND SUNDAY AND TAKE 1 TABLET BY MOUTH THE OTHER 5 DAYS 40 Tab 11  
 nortriptyline (PAMELOR) 10 mg capsule TAKE 1 TO 2 CAPSULES BY MOUTH AT BEDTIME FOR  SHINGLES  PAIN 60 Cap 5  
 vit C,W-Ct-eboqk-lutein-zeaxan (PRESERVISION AREDS-2) 518-828-22-1 mg-unit-mg-mg cap capsule Take 1 Cap by mouth two (2) times daily (with meals).  aspirin delayed-release 81 mg tablet Take 1 Tab by mouth daily. 90 Tab 3  polyethylene glycol (MIRALAX) 17 gram/dose powder Take 17 g by mouth two (2) times a day. 1 tablespoon with 8 oz of water daily 510 g 0  
 acetaminophen (TYLENOL EXTRA STRENGTH) 500 mg tablet Take  by mouth every six (6) hours as needed. Allergies Allergen Reactions  Lyrica [Pregabalin] Other (comments) Couldn't function  Macrobid [Nitrofurantoin Monohyd/M-Cryst] Other (comments)  
  sick  Neurontin [Gabapentin] Other (comments) groggy Family History Problem Relation Age of Onset  Cancer Father Social History Tobacco Use  Smoking status: Never Smoker  Smokeless tobacco: Never Used Substance Use Topics  Alcohol use: No  
 
 
Depression Risk Factor Screening:  
 
3 most recent PHQ Screens 2/6/2020 Little interest or pleasure in doing things Not at all Feeling down, depressed, irritable, or hopeless Not at all Total Score PHQ 2 0 Trouble falling or staying asleep, or sleeping too much - Feeling tired or having little energy - Poor appetite, weight loss, or overeating - Feeling bad about yourself - or that you are a failure or have let yourself or your family down - Trouble concentrating on things such as school, work, reading, or watching TV - Moving or speaking so slowly that other people could have noticed; or the opposite being so fidgety that others notice - Thoughts of being better off dead, or hurting yourself in some way -  
PHQ 9 Score - How difficult have these problems made it for you to do your work, take care of your home and get along with others - Alcohol Risk Factor Screening: Do you average 1 drink per night or more than 7 drinks a week:  No 
 
On any one occasion in the past three months have you have had more than 3 drinks containing alcohol:  No 
 
 
Functional Ability and Level of Safety:  
Hearing:hearing is not good Activities of Daily Living: The home contains: handrails and grab bars Patient needs help with:  transportation, shopping, laundry, housework, managing money and walking Ambulation: with difficulty, uses a cane and walker Fall Risk: 
Fall Risk Assessment, last 12 mths 2/6/2020 Able to walk? Yes Fall in past 12 months? No  
Fall with injury? -  
Number of falls in past 12 months - Fall Risk Score -  
 
 
Abuse Screen: 
Patient is not abused Cognitive Screening Has your family/caregiver stated any concerns about your memory: no 
Cognitive Screening: Normal - Verbal Fluency Test 
 
Patient Care Team  
Patient Care Team: 
En Stoll MD as PCP - General (Internal Medicine) En Stoll MD as PCP - [facility] Empaneled Provider Assessment/Plan Education and counseling provided: 
Are appropriate based on today's review and evaluation Health Maintenance Due Topic Date Due  Shingrix Vaccine Age 50> (1 of 2) 02/14/1962  Lipid Screen  01/26/2019  Medicare Yearly Exam  02/29/2020

## 2020-02-18 NOTE — PATIENT INSTRUCTIONS
Please bring us copy of advance directive (living will). May use Imodium AD (loperiamide 2 mg) up to 2 times a day, for diarrhea. The best way to stay healthy is to live a healthy lifestyle. A healthy lifestyle includes regular exercise, eating a well-balanced diet, keeping a healthy weight and not smoking. Regular physical exams and screening tests are another important way to take care of yourself. Preventive exams provided by health care providers can find health problems early when treatment works best and can keep you from getting certain diseases or illnesses. Preventive services include exams, lab tests, screenings, shots, monitoring and information to help you take care of your own health. All people over 65 should have a pneumonia shot. Pneumonia shots are usually only needed once in a lifetime unless your doctor decides differently. In addition to your physical exam, some screening tests are recommended: 
 
All people over 65 should have a yearly flu shot. People over 65 are at medium to high risk for Hepatitis B. Three shots are needed for complete protection. Bone mass measurement (dexa scan) is recommended every two years. Diabetes Mellitus screening is recommended every year. Glaucoma is an eye disease caused by high pressure in the eye. An eye exam is recommended every year. Cardiovascular screening tests that check your cholesterol and other blood fat (lipid) levels are recommended every five years. Colorectal Cancer screening tests help to find pre-cancerous polyps (growths in the colon) so they can be removed before they turn into cancer. Tests ordered for screening depend on your personal and family history risk factors. Prostate Cancer Screening (annually up to age 76) Screening for breast cancer is recommended yearly with a Mammogram. 
 
Screening for cervical and vaginal cancer is recommended with a pelvic and Pap test every two years. However if you have had an abnormal pap in the past  three years or at high risk for cervical or vaginal cancer Medicare will cover a pap test and a pelvic exam every year. Here is a list of your current Health Maintenance items with a due date: 
Health Maintenance Due Topic Date Due  Shingles Vaccine (1 of 2) 02[birth year]14/1962  Cholesterol Test   01/26/2019 [place] Annual Well Visit  02/29/2020

## 2020-02-18 NOTE — PROGRESS NOTES
Adriel Linder  Identified pt with two pt identifiers(name and ). Chief Complaint Patient presents with  Hypertension  Chronic Kidney Disease  Cholesterol Problem Reviewed record In preparation for visit and have obtained necessary documentation. Has info on advanced directive but has not filled them out. 1. Have you been to the ER, urgent care clinic or hospitalized since your last visit?  2020 
 
2. Have you seen or consulted any other health care providers outside of the [address] [place] [address] since your last visit? Include any pap smears or colon screening. Dr Miguelangel Bermudez NP Vitals reviewed with provider. Health Maintenance reviewed:  
 
Health Maintenance Due Topic  Shingrix Vaccine Age 50> (1 of 2)  Lipid Screen  Medicare Yearly Exam   
  
 
 
Wt Readings from Last 3 Encounters:  
20 121 lb (54.9 kg) 20 122 lb 9.6 oz (55.6 kg) 20 125 lb (56.7 kg) Temp Readings from Last 3 Encounters:  
20 97.5 °F (36.4 °C) (Oral) 20 97.5 °F (36.4 °C) (Oral) 20 98.6 °F (37 °C) BP Readings from Last 3 Encounters:  
20 131/53  
20 158/72  
20 154/66 Pulse Readings from Last 3 Encounters:  
20 77  
20 91  
20 84 Vitals:  
 20 1109 20 1113 BP: 149/61 131/53 Pulse: 77 Resp: 12 Temp: 97.5 °F (36.4 °C) TempSrc: Oral   
SpO2: 95% Weight: 121 lb (54.9 kg) Height: 5' 7\" (1.702 m) PainSc:   0 - No pain Learning Assessment:  
:  
 
 
Learning Assessment 2017 PRIMARY LEARNER Patient Patient HIGHEST LEVEL OF EDUCATION - PRIMARY LEARNER  DID NOT GRADUATE HIGH SCHOOL DID NOT GRADUATE HIGH SCHOOL  
BARRIERS PRIMARY LEARNER HEARING HEARING  
[address] CAREGIVER Yes Yes [address] [facility]  
 CO-LEARNER HIGHEST LEVEL OF EDUCATION 4 YEARS OF COLLEGE SOME COLLEGE  
BARRIERS CO-LEARNER - NONE PRIMARY LANGUAGE ENGLISH ENGLISH  
PRIMARY LANGUAGE CO-LEARNER - ENGLISH  NEED - No  
LEARNER PREFERENCE PRIMARY LISTENING LISTENING  
LEARNER PREFERENCE CO-LEARNER - OTHER (COMMENT) LEARNING SPECIAL TOPICS - NA  
ANSWERED BY Patient patient RELATIONSHIP SELF SELF Depression Screening:  
:  
 
 
3 most recent PHQ Screens 2/6/2020 Little interest or pleasure in doing things Not at all Feeling down, depressed, irritable, or hopeless Not at all Total Score PHQ 2 0 Trouble falling or staying asleep, or sleeping too much - Feeling tired or having little energy - Poor appetite, weight loss, or overeating - Feeling bad about yourself - or that you are a failure or have let yourself or your family down - Trouble concentrating on things such as school, work, reading, or watching TV - Moving or speaking so slowly that other people could have noticed; or the opposite being so fidgety that others notice - Thoughts of being better off dead, or hurting yourself in some way -  
PHQ 9 Score - How difficult have these problems made it for you to do your work, take care of your home and get along with others - Fall Risk Assessment:  
:  
 
 
Fall Risk Assessment, last 12 mths 2/6/2020 Able to walk? Yes Fall in past 12 months? No  
Fall with injury? -  
Number of falls in past 12 months - Fall Risk Score -  
  
 
Abuse Screening:  
:  
 
 
Abuse Screening Questionnaire 10/17/2019 7/13/2018 8/4/2017 8/30/2016 6/18/2015 6/18/2014 Do you ever feel afraid of your partner? N N N N N N Are you in a relationship with someone who physically or mentally threatens you? N N N N N N Is it safe for you to go home? Y Y Y Y Y Y  
  
 
ADL Screening:  
:  
 
 
ADL Assessment 12/1/2014 Feeding yourself No Help Needed Getting from bed to chair No Help Needed Getting dressed No Help Needed Bathing or showering No Help Needed Walk across the room (includes cane/walker) No Help Needed Using the telphone No Help Needed Taking your medications Help Needed Preparing meals No Help Needed Managing money (expenses/bills) No Help Needed Moderately strenuous housework (laundry) No Help Needed Shopping for personal items (toiletries/medicines) Help Needed Shopping for groceries Help Needed Driving Help Needed Climbing a flight of stairs No Help Needed Getting to places beyond walking distances No Help Needed

## 2020-02-27 ENCOUNTER — OFFICE VISIT (OUTPATIENT)
Dept: ORTHOPEDIC SURGERY | Age: 85
End: 2020-02-27

## 2020-02-27 VITALS
HEIGHT: 67 IN | WEIGHT: 121 LBS | SYSTOLIC BLOOD PRESSURE: 158 MMHG | BODY MASS INDEX: 18.99 KG/M2 | DIASTOLIC BLOOD PRESSURE: 64 MMHG | HEART RATE: 50 BPM | OXYGEN SATURATION: 99 %

## 2020-02-27 DIAGNOSIS — S92.412A CLOSED FRACTURE OF PROXIMAL PHALANX OF LEFT GREAT TOE, INITIAL ENCOUNTER: Primary | ICD-10-CM

## 2020-02-27 NOTE — PROGRESS NOTES
2/27/2020      CC: Right great toe pain    HPI:      This is a 8 y.o. year old female who presents for a follow up visit. The patient was last seen and diagnosed with right great toe proximal phalanx fracture. The patient's treatments since the most recent visit have comprised of weight bearing to tolerance. The patient has had good relief of the chief complaint. Additionally, some soreness remains. PMH:  Past Medical History:   Diagnosis Date    Chronic kidney disease     CKD (chronic kidney disease) stage 3, GFR 30-59 ml/min ([facility]) 5/17/2010    Hypercholesteremia     Hypertension     Post herpetic neuralgia     Stroke ([facility] [address]     Thyroid disease        PSxHx:  Past Surgical History:   Procedure Laterality Date    HX APPENDECTOMY      HX HYSTERECTOMY  1947    one ovary out       Meds:    Current Outpatient Medications:     hydroCHLOROthiazide (MICROZIDE) 12.5 mg capsule, TAKE 1 CAPSULE BY MOUTH ONCE DAILY, Disp: 90 Cap, Rfl: 3    multivit-min-FA-lycopen-lutein (CENTRUM SILVER) 0.4-300-250 mg-mcg-mcg tab, Take  by mouth., Disp: , Rfl:     simvastatin (ZOCOR) 20 mg tablet, TAKE ONE TABLET BY MOUTH NIGHTLY, Disp: 30 Tab, Rfl: 3    levothyroxine (SYNTHROID) 50 mcg tablet, TAKE 2 TABLETS BY MOUTH ON SATURDAY AND SUNDAY AND TAKE 1 TABLET BY MOUTH THE OTHER 5 DAYS, Disp: 40 Tab, Rfl: 11    vit C,R-Bu-jmmvp-lutein-zeaxan (PRESERVISION AREDS-2) 285-900-91-1 mg-unit-mg-mg cap capsule, Take 1 Cap by mouth two (2) times daily (with meals). , Disp: , Rfl:     aspirin delayed-release 81 mg tablet, Take 1 Tab by mouth daily. , Disp: 90 Tab, Rfl: 3    acetaminophen (TYLENOL EXTRA STRENGTH) 500 mg tablet, Take  by mouth every six (6) hours as needed. , Disp: , Rfl:     nortriptyline (PAMELOR) 10 mg capsule, TAKE 1 TO 2 CAPSULES BY MOUTH AT BEDTIME FOR  SHINGLES  PAIN, Disp: 60 Cap, Rfl: 5    polyethylene glycol (MIRALAX) 17 gram/dose powder, Take 17 g by mouth two (2) times a day.  1 tablespoon with 8 oz of water daily, Disp: 510 g, Rfl: 0    All: Allergies   Allergen Reactions    Lyrica [Pregabalin] Other (comments)     Couldn't function    Macrobid [Nitrofurantoin Monohyd/M-Cryst] Other (comments)     sick    Neurontin [Gabapentin] Other (comments)     groggy       Social Hx:  Social History     Socioeconomic History    Marital status:      Spouse name: Not on file    Number of children: Not on file    Years of education: Not on file    Highest education level: Not on file   Tobacco Use    Smoking status: Never Smoker    Smokeless tobacco: Never Used   Substance and Sexual Activity    Alcohol use: No    Drug use: No       Family Hx:  Family History   Problem Relation Age of Onset    Cancer Father          Review of Systems:       General: Denies headache, lethargy, fever, weight loss  Ears/Nose/Throat: Denies ear discharge, drainage, nosebleeds, hoarse voice, dental problems  Cardiovascular: Denies chest pain, shortness of breath  Lungs: Denies chest pain, breathing problems, wheezing, pneumonia  Stomach: Denies stomach pain, heartburn, constipation, irritable bowel  Skin: Denies rash, sores, open wounds  Musculoskeletal: great toe pain  Genitourinary: Denies dysuria, hematuria, polyuria  Gastrointestinal: Denies constipation, obstipation, diarrhea  Neurological: Denies changes in sight, smell, hearing, taste, seizures. Denies loss of consciousness.   Psychiatric: Denies depression, sleep pattern changes, anxiety, change in personality  Endocrine: Denies mood swings, heat or cold intolerance  Hematologic/Lymphatic: Denies anemia, purpura, petechia  Allergic/Immunologic: Denies swelling of throat, pain or swelling at lymph nodes      Physical Examination:    Visit Vitals  /64 (BP 1 Location: Left arm, BP Patient Position: Sitting)   Pulse (!) 50   Ht 5' 7\" (1.702 m)   Wt 121 lb (54.9 kg)   SpO2 99%   BMI 18.95 kg/m²        General: AOX3, no apparent distress  Psychiatric: mood and affect appropriate  Lungs: breathing is symmetric and unlabored bilaterally  Heart: regular rate and rhythm  Abdomen: no guarding  Head: normocephalic, atraumatic  Skin: No significant abnormalities, good turgor  Sensation intact to light touch: C5-T1 dermatomes  Muscular exam: 5/5 strength in all major muscle groups unless noted in specialty exam.    Extremities      Right upper extremity: no exam  Left upper extremity:  No exam  Right lower extremity: No gross deformity. No restriction to range of motion of the hip, knee, ankle. Muscle bulk is appropriate without wasting. Sensation is intact to light touch in the L1-S1 dermatomes. Capillary refill is less than 2 seconds in the fingers. Strength testing is 5/5 at the major muscle groups of the hip, knee, ankle. .  Left lower extremity:  No gross deformity. No restriction to range of motion of the hip, knee, ankle. Muscle bulk is appropriate without wasting. Sensation is intact to light touch in the L1-S1 dermatomes. Capillary refill is less than 2 seconds in the fingers. Strength testing is 5/5 at the major muscle groups of the hip, knee, ankle. Diagnostics:    Pertinent Diagnostics: none today,     Assessment: Right great toe fracture, resolving  Plan:    Weight bearing to tolerance, pain control as needed. No restrictions on activity, though I would expect another 4 weeks of symptoms. Patient stated her understanding and satisfaction. Ms. Renata Chacon has a reminder for a \"due or due soon\" health maintenance. I have asked that she contact her primary care provider for follow-up on this health maintenance.

## 2020-02-27 NOTE — PROGRESS NOTES
Identified pt with two pt identifiers (name and ). Reviewed chart in preparation for visit and have obtained necessary documentation. Joanna Arzola is a 80 y.o. female  Chief Complaint   Patient presents with    Toe Pain     LT big toe     Visit Vitals  /64 (BP 1 Location: Left arm, BP Patient Position: Sitting)   Pulse (!) 50   Ht 5' 7\" (1.702 m)   Wt 121 lb (54.9 kg)   SpO2 99%   BMI 18.95 kg/m²     1. Have you been to the ER, urgent care clinic since your last visit? Hospitalized since your last visit? No    2. Have you seen or consulted any other health care providers outside of the [address] [place] [address] since your last visit? Include any pap smears or colon screening.  No

## 2020-03-04 ENCOUNTER — HOSPITAL ENCOUNTER (INPATIENT)
Age: 85
LOS: 5 days | Discharge: NURSING, REHAB OR LONG-TERM CARE | DRG: 481 | End: 2020-03-09
Attending: EMERGENCY MEDICINE | Admitting: INTERNAL MEDICINE
Payer: MEDICARE

## 2020-03-04 ENCOUNTER — APPOINTMENT (OUTPATIENT)
Dept: GENERAL RADIOLOGY | Age: 85
DRG: 481 | End: 2020-03-04
Attending: EMERGENCY MEDICINE
Payer: MEDICARE

## 2020-03-04 DIAGNOSIS — S72.141A CLOSED DISPLACED INTERTROCHANTERIC FRACTURE OF RIGHT FEMUR, INITIAL ENCOUNTER (HCC): Primary | ICD-10-CM

## 2020-03-04 PROBLEM — S72.001A CLOSED RIGHT HIP FRACTURE (HCC): Status: ACTIVE | Noted: 2020-03-04

## 2020-03-04 LAB
ALBUMIN SERPL-MCNC: 3.5 G/DL (ref 3.5–5)
ALBUMIN/GLOB SERPL: 0.9 {RATIO} (ref 1.1–2.2)
ALP SERPL-CCNC: 104 U/L (ref 45–117)
ALT SERPL-CCNC: 25 U/L (ref 12–78)
ANION GAP SERPL CALC-SCNC: 3 MMOL/L (ref 5–15)
AST SERPL-CCNC: 26 U/L (ref 15–37)
BASOPHILS # BLD: 0 K/UL (ref 0–0.1)
BASOPHILS NFR BLD: 0 % (ref 0–1)
BILIRUB SERPL-MCNC: 0.3 MG/DL (ref 0.2–1)
BUN SERPL-MCNC: 36 MG/DL (ref 6–20)
BUN/CREAT SERPL: 21 (ref 12–20)
CALCIUM SERPL-MCNC: 9.2 MG/DL (ref 8.5–10.1)
CHLORIDE SERPL-SCNC: 102 MMOL/L (ref 97–108)
CO2 SERPL-SCNC: 32 MMOL/L (ref 21–32)
COMMENT, HOLDF: NORMAL
CREAT SERPL-MCNC: 1.7 MG/DL (ref 0.55–1.02)
DIFFERENTIAL METHOD BLD: ABNORMAL
EOSINOPHIL # BLD: 0.1 K/UL (ref 0–0.4)
EOSINOPHIL NFR BLD: 1 % (ref 0–7)
ERYTHROCYTE [DISTWIDTH] IN BLOOD BY AUTOMATED COUNT: 13.5 % (ref 11.5–14.5)
GLOBULIN SER CALC-MCNC: 4.1 G/DL (ref 2–4)
GLUCOSE SERPL-MCNC: 112 MG/DL (ref 65–100)
HCT VFR BLD AUTO: 37.6 % (ref 35–47)
HGB BLD-MCNC: 11.9 G/DL (ref 11.5–16)
IMM GRANULOCYTES # BLD AUTO: 0 K/UL (ref 0–0.04)
IMM GRANULOCYTES NFR BLD AUTO: 0 % (ref 0–0.5)
LYMPHOCYTES # BLD: 1.1 K/UL (ref 0.8–3.5)
LYMPHOCYTES NFR BLD: 14 % (ref 12–49)
MCH RBC QN AUTO: 30.1 PG (ref 26–34)
MCHC RBC AUTO-ENTMCNC: 31.6 G/DL (ref 30–36.5)
MCV RBC AUTO: 95.2 FL (ref 80–99)
MONOCYTES # BLD: 0.7 K/UL (ref 0–1)
MONOCYTES NFR BLD: 8 % (ref 5–13)
NEUTS SEG # BLD: 6.1 K/UL (ref 1.8–8)
NEUTS SEG NFR BLD: 77 % (ref 32–75)
NRBC # BLD: 0 K/UL (ref 0–0.01)
NRBC BLD-RTO: 0 PER 100 WBC
PLATELET # BLD AUTO: 265 K/UL (ref 150–400)
PMV BLD AUTO: 10.8 FL (ref 8.9–12.9)
POTASSIUM SERPL-SCNC: 4.1 MMOL/L (ref 3.5–5.1)
PROT SERPL-MCNC: 7.6 G/DL (ref 6.4–8.2)
RBC # BLD AUTO: 3.95 M/UL (ref 3.8–5.2)
SAMPLES BEING HELD,HOLD: NORMAL
SODIUM SERPL-SCNC: 137 MMOL/L (ref 136–145)
WBC # BLD AUTO: 8 K/UL (ref 3.6–11)

## 2020-03-04 PROCEDURE — 73502 X-RAY EXAM HIP UNI 2-3 VIEWS: CPT

## 2020-03-04 PROCEDURE — 85025 COMPLETE CBC W/AUTO DIFF WBC: CPT

## 2020-03-04 PROCEDURE — 74011250636 HC RX REV CODE- 250/636: Performed by: EMERGENCY MEDICINE

## 2020-03-04 PROCEDURE — 85610 PROTHROMBIN TIME: CPT

## 2020-03-04 PROCEDURE — 86900 BLOOD TYPING SEROLOGIC ABO: CPT

## 2020-03-04 PROCEDURE — 36415 COLL VENOUS BLD VENIPUNCTURE: CPT

## 2020-03-04 PROCEDURE — 80053 COMPREHEN METABOLIC PANEL: CPT

## 2020-03-04 PROCEDURE — 94761 N-INVAS EAR/PLS OXIMETRY MLT: CPT

## 2020-03-04 PROCEDURE — 51702 INSERT TEMP BLADDER CATH: CPT

## 2020-03-04 PROCEDURE — 65270000029 HC RM PRIVATE

## 2020-03-04 PROCEDURE — 71045 X-RAY EXAM CHEST 1 VIEW: CPT

## 2020-03-04 PROCEDURE — 74011250637 HC RX REV CODE- 250/637: Performed by: EMERGENCY MEDICINE

## 2020-03-04 PROCEDURE — 99285 EMERGENCY DEPT VISIT HI MDM: CPT

## 2020-03-04 PROCEDURE — 73552 X-RAY EXAM OF FEMUR 2/>: CPT

## 2020-03-04 RX ORDER — SODIUM CHLORIDE 9 MG/ML
75 INJECTION, SOLUTION INTRAVENOUS CONTINUOUS
Status: DISCONTINUED | OUTPATIENT
Start: 2020-03-04 | End: 2020-03-05

## 2020-03-04 RX ORDER — ACETAMINOPHEN 325 MG/1
650 TABLET ORAL
Status: COMPLETED | OUTPATIENT
Start: 2020-03-04 | End: 2020-03-04

## 2020-03-04 RX ADMIN — ACETAMINOPHEN 650 MG: 325 TABLET ORAL at 22:36

## 2020-03-04 RX ADMIN — SODIUM CHLORIDE 100 ML/HR: 900 INJECTION, SOLUTION INTRAVENOUS at 23:29

## 2020-03-05 ENCOUNTER — TELEPHONE (OUTPATIENT)
Dept: INTERNAL MEDICINE CLINIC | Facility: CLINIC | Age: 85
End: 2020-03-05

## 2020-03-05 ENCOUNTER — APPOINTMENT (OUTPATIENT)
Dept: GENERAL RADIOLOGY | Age: 85
DRG: 481 | End: 2020-03-05
Attending: ORTHOPAEDIC SURGERY
Payer: MEDICARE

## 2020-03-05 ENCOUNTER — ANESTHESIA EVENT (OUTPATIENT)
Dept: SURGERY | Age: 85
DRG: 481 | End: 2020-03-05
Payer: MEDICARE

## 2020-03-05 ENCOUNTER — ANESTHESIA (OUTPATIENT)
Dept: SURGERY | Age: 85
DRG: 481 | End: 2020-03-05
Payer: MEDICARE

## 2020-03-05 LAB
ABO + RH BLD: NORMAL
ANION GAP SERPL CALC-SCNC: 5 MMOL/L (ref 5–15)
APPEARANCE UR: CLEAR
BACTERIA URNS QL MICRO: NEGATIVE /HPF
BILIRUB UR QL: NEGATIVE
BLOOD GROUP ANTIBODIES SERPL: NORMAL
BUN SERPL-MCNC: 35 MG/DL (ref 6–20)
BUN/CREAT SERPL: 22 (ref 12–20)
CALCIUM SERPL-MCNC: 9 MG/DL (ref 8.5–10.1)
CHLORIDE SERPL-SCNC: 103 MMOL/L (ref 97–108)
CO2 SERPL-SCNC: 28 MMOL/L (ref 21–32)
COLOR UR: ABNORMAL
CREAT SERPL-MCNC: 1.57 MG/DL (ref 0.55–1.02)
EPITH CASTS URNS QL MICRO: ABNORMAL /LPF
GLUCOSE SERPL-MCNC: 130 MG/DL (ref 65–100)
GLUCOSE UR STRIP.AUTO-MCNC: NEGATIVE MG/DL
HGB UR QL STRIP: NEGATIVE
HYALINE CASTS URNS QL MICRO: ABNORMAL /LPF (ref 0–5)
INR PPP: 1 (ref 0.9–1.1)
KETONES UR QL STRIP.AUTO: ABNORMAL MG/DL
LEUKOCYTE ESTERASE UR QL STRIP.AUTO: NEGATIVE
NITRITE UR QL STRIP.AUTO: NEGATIVE
PH UR STRIP: 7 [PH] (ref 5–8)
POTASSIUM SERPL-SCNC: 4 MMOL/L (ref 3.5–5.1)
PROT UR STRIP-MCNC: NEGATIVE MG/DL
PROTHROMBIN TIME: 10.4 SEC (ref 9–11.1)
RBC #/AREA URNS HPF: ABNORMAL /HPF (ref 0–5)
SODIUM SERPL-SCNC: 136 MMOL/L (ref 136–145)
SP GR UR REFRACTOMETRY: 1.01 (ref 1–1.03)
SPECIMEN EXP DATE BLD: NORMAL
UA: UC IF INDICATED,UAUC: ABNORMAL
UROBILINOGEN UR QL STRIP.AUTO: 0.2 EU/DL (ref 0.2–1)
WBC URNS QL MICRO: ABNORMAL /HPF (ref 0–4)

## 2020-03-05 PROCEDURE — 77030018836 HC SOL IRR NACL ICUM -A: Performed by: ORTHOPAEDIC SURGERY

## 2020-03-05 PROCEDURE — 74011250636 HC RX REV CODE- 250/636: Performed by: ANESTHESIOLOGY

## 2020-03-05 PROCEDURE — 77030040922 HC BLNKT HYPOTHRM STRY -A

## 2020-03-05 PROCEDURE — 77010033678 HC OXYGEN DAILY

## 2020-03-05 PROCEDURE — 77030016474 HC BIT DRL QC3 SYNT -C: Performed by: ORTHOPAEDIC SURGERY

## 2020-03-05 PROCEDURE — 94760 N-INVAS EAR/PLS OXIMETRY 1: CPT

## 2020-03-05 PROCEDURE — 36415 COLL VENOUS BLD VENIPUNCTURE: CPT

## 2020-03-05 PROCEDURE — 74011250636 HC RX REV CODE- 250/636: Performed by: NURSE ANESTHETIST, CERTIFIED REGISTERED

## 2020-03-05 PROCEDURE — 81001 URINALYSIS AUTO W/SCOPE: CPT

## 2020-03-05 PROCEDURE — 77030020788: Performed by: ORTHOPAEDIC SURGERY

## 2020-03-05 PROCEDURE — 76000 FLUOROSCOPY <1 HR PHYS/QHP: CPT

## 2020-03-05 PROCEDURE — 77030014405 HC GD ROD RMR SYNT -C: Performed by: ORTHOPAEDIC SURGERY

## 2020-03-05 PROCEDURE — 76010000149 HC OR TIME 1 TO 1.5 HR: Performed by: ORTHOPAEDIC SURGERY

## 2020-03-05 PROCEDURE — 74011250636 HC RX REV CODE- 250/636: Performed by: ORTHOPAEDIC SURGERY

## 2020-03-05 PROCEDURE — C1769 GUIDE WIRE: HCPCS | Performed by: ORTHOPAEDIC SURGERY

## 2020-03-05 PROCEDURE — C1713 ANCHOR/SCREW BN/BN,TIS/BN: HCPCS | Performed by: ORTHOPAEDIC SURGERY

## 2020-03-05 PROCEDURE — 77030031139 HC SUT VCRL2 J&J -A: Performed by: ORTHOPAEDIC SURGERY

## 2020-03-05 PROCEDURE — 80048 BASIC METABOLIC PNL TOTAL CA: CPT

## 2020-03-05 PROCEDURE — 76210000000 HC OR PH I REC 2 TO 2.5 HR: Performed by: ORTHOPAEDIC SURGERY

## 2020-03-05 PROCEDURE — 74011250636 HC RX REV CODE- 250/636: Performed by: INTERNAL MEDICINE

## 2020-03-05 PROCEDURE — 74011250637 HC RX REV CODE- 250/637: Performed by: ORTHOPAEDIC SURGERY

## 2020-03-05 PROCEDURE — 74011000250 HC RX REV CODE- 250: Performed by: ORTHOPAEDIC SURGERY

## 2020-03-05 PROCEDURE — 74011250636 HC RX REV CODE- 250/636: Performed by: PHYSICIAN ASSISTANT

## 2020-03-05 PROCEDURE — 0QS606Z REPOSITION RIGHT UPPER FEMUR WITH INTRAMEDULLARY INTERNAL FIXATION DEVICE, OPEN APPROACH: ICD-10-PCS | Performed by: ORTHOPAEDIC SURGERY

## 2020-03-05 PROCEDURE — 74011000250 HC RX REV CODE- 250: Performed by: PHYSICIAN ASSISTANT

## 2020-03-05 PROCEDURE — 77030018846 HC SOL IRR STRL H20 ICUM -A: Performed by: ORTHOPAEDIC SURGERY

## 2020-03-05 PROCEDURE — 77030040361 HC SLV COMPR DVT MDII -B: Performed by: ORTHOPAEDIC SURGERY

## 2020-03-05 PROCEDURE — 65270000029 HC RM PRIVATE

## 2020-03-05 PROCEDURE — 74011000250 HC RX REV CODE- 250: Performed by: NURSE ANESTHETIST, CERTIFIED REGISTERED

## 2020-03-05 PROCEDURE — 73501 X-RAY EXAM HIP UNI 1 VIEW: CPT

## 2020-03-05 PROCEDURE — 76060000033 HC ANESTHESIA 1 TO 1.5 HR: Performed by: ORTHOPAEDIC SURGERY

## 2020-03-05 PROCEDURE — 74011250637 HC RX REV CODE- 250/637: Performed by: INTERNAL MEDICINE

## 2020-03-05 DEVICE — NAIL IM L235MM DIA12MM 125DEG SHT GRN R PROX FEM TI: Type: IMPLANTABLE DEVICE | Site: HIP | Status: FUNCTIONAL

## 2020-03-05 DEVICE — SCREW BNE L95MM DIA10.35MM PROX FEM G TI CANN FOR TFN ADV: Type: IMPLANTABLE DEVICE | Site: HIP | Status: FUNCTIONAL

## 2020-03-05 DEVICE — SCREW BNE L34MM DIA5MM NONSTERILE TIB LT GRN TI ST CANN LOK: Type: IMPLANTABLE DEVICE | Site: HIP | Status: FUNCTIONAL

## 2020-03-05 RX ORDER — SODIUM CHLORIDE 0.9 % (FLUSH) 0.9 %
5-40 SYRINGE (ML) INJECTION EVERY 8 HOURS
Status: DISCONTINUED | OUTPATIENT
Start: 2020-03-05 | End: 2020-03-09 | Stop reason: HOSPADM

## 2020-03-05 RX ORDER — ONDANSETRON 2 MG/ML
4 INJECTION INTRAMUSCULAR; INTRAVENOUS
Status: DISCONTINUED | OUTPATIENT
Start: 2020-03-05 | End: 2020-03-06

## 2020-03-05 RX ORDER — ACETAMINOPHEN 10 MG/ML
1000 INJECTION, SOLUTION INTRAVENOUS ONCE
Status: DISCONTINUED | OUTPATIENT
Start: 2020-03-05 | End: 2020-03-05 | Stop reason: HOSPADM

## 2020-03-05 RX ORDER — ONDANSETRON 2 MG/ML
INJECTION INTRAMUSCULAR; INTRAVENOUS AS NEEDED
Status: DISCONTINUED | OUTPATIENT
Start: 2020-03-05 | End: 2020-03-05 | Stop reason: HOSPADM

## 2020-03-05 RX ORDER — HEPARIN SODIUM 5000 [USP'U]/ML
5000 INJECTION, SOLUTION INTRAVENOUS; SUBCUTANEOUS EVERY 12 HOURS
Status: DISCONTINUED | OUTPATIENT
Start: 2020-03-06 | End: 2020-03-09 | Stop reason: HOSPADM

## 2020-03-05 RX ORDER — NALOXONE HYDROCHLORIDE 0.4 MG/ML
0.4 INJECTION, SOLUTION INTRAMUSCULAR; INTRAVENOUS; SUBCUTANEOUS AS NEEDED
Status: DISCONTINUED | OUTPATIENT
Start: 2020-03-05 | End: 2020-03-09 | Stop reason: HOSPADM

## 2020-03-05 RX ORDER — KETAMINE HYDROCHLORIDE 50 MG/ML
INJECTION, SOLUTION INTRAMUSCULAR; INTRAVENOUS AS NEEDED
Status: DISCONTINUED | OUTPATIENT
Start: 2020-03-05 | End: 2020-03-05 | Stop reason: HOSPADM

## 2020-03-05 RX ORDER — SODIUM CHLORIDE 0.9 % (FLUSH) 0.9 %
5-40 SYRINGE (ML) INJECTION EVERY 8 HOURS
Status: DISCONTINUED | OUTPATIENT
Start: 2020-03-05 | End: 2020-03-05 | Stop reason: HOSPADM

## 2020-03-05 RX ORDER — SODIUM CHLORIDE, SODIUM LACTATE, POTASSIUM CHLORIDE, CALCIUM CHLORIDE 600; 310; 30; 20 MG/100ML; MG/100ML; MG/100ML; MG/100ML
25 INJECTION, SOLUTION INTRAVENOUS CONTINUOUS
Status: DISCONTINUED | OUTPATIENT
Start: 2020-03-05 | End: 2020-03-05 | Stop reason: HOSPADM

## 2020-03-05 RX ORDER — ROCURONIUM BROMIDE 10 MG/ML
INJECTION, SOLUTION INTRAVENOUS AS NEEDED
Status: DISCONTINUED | OUTPATIENT
Start: 2020-03-05 | End: 2020-03-05 | Stop reason: HOSPADM

## 2020-03-05 RX ORDER — HYDROCHLOROTHIAZIDE 12.5 MG/1
12.5 CAPSULE ORAL DAILY
Status: DISCONTINUED | OUTPATIENT
Start: 2020-03-05 | End: 2020-03-09 | Stop reason: HOSPADM

## 2020-03-05 RX ORDER — FACIAL-BODY WIPES
10 EACH TOPICAL DAILY PRN
Status: DISCONTINUED | OUTPATIENT
Start: 2020-03-07 | End: 2020-03-09 | Stop reason: HOSPADM

## 2020-03-05 RX ORDER — SODIUM CHLORIDE 0.9 % (FLUSH) 0.9 %
5-40 SYRINGE (ML) INJECTION AS NEEDED
Status: DISCONTINUED | OUTPATIENT
Start: 2020-03-05 | End: 2020-03-05 | Stop reason: HOSPADM

## 2020-03-05 RX ORDER — POLYETHYLENE GLYCOL 3350 17 G/17G
17 POWDER, FOR SOLUTION ORAL DAILY
Status: DISCONTINUED | OUTPATIENT
Start: 2020-03-06 | End: 2020-03-05

## 2020-03-05 RX ORDER — NALOXONE HYDROCHLORIDE 0.4 MG/ML
0.4 INJECTION, SOLUTION INTRAMUSCULAR; INTRAVENOUS; SUBCUTANEOUS AS NEEDED
Status: DISCONTINUED | OUTPATIENT
Start: 2020-03-05 | End: 2020-03-05 | Stop reason: SDUPTHER

## 2020-03-05 RX ORDER — GLYCOPYRROLATE 0.2 MG/ML
INJECTION INTRAMUSCULAR; INTRAVENOUS AS NEEDED
Status: DISCONTINUED | OUTPATIENT
Start: 2020-03-05 | End: 2020-03-05 | Stop reason: HOSPADM

## 2020-03-05 RX ORDER — LEVOTHYROXINE SODIUM 50 UG/1
50 TABLET ORAL
Status: DISCONTINUED | OUTPATIENT
Start: 2020-03-05 | End: 2020-03-06

## 2020-03-05 RX ORDER — TRAMADOL HYDROCHLORIDE 50 MG/1
50 TABLET ORAL
Status: DISCONTINUED | OUTPATIENT
Start: 2020-03-05 | End: 2020-03-09 | Stop reason: HOSPADM

## 2020-03-05 RX ORDER — SODIUM CHLORIDE 0.9 % (FLUSH) 0.9 %
5-40 SYRINGE (ML) INJECTION AS NEEDED
Status: DISCONTINUED | OUTPATIENT
Start: 2020-03-05 | End: 2020-03-09 | Stop reason: HOSPADM

## 2020-03-05 RX ORDER — SODIUM CHLORIDE 9 MG/ML
125 INJECTION, SOLUTION INTRAVENOUS CONTINUOUS
Status: DISPENSED | OUTPATIENT
Start: 2020-03-05 | End: 2020-03-06

## 2020-03-05 RX ORDER — ACETAMINOPHEN 325 MG/1
650 TABLET ORAL
Status: DISCONTINUED | OUTPATIENT
Start: 2020-03-05 | End: 2020-03-09 | Stop reason: HOSPADM

## 2020-03-05 RX ORDER — ATORVASTATIN CALCIUM 10 MG/1
10 TABLET, FILM COATED ORAL
Status: DISCONTINUED | OUTPATIENT
Start: 2020-03-05 | End: 2020-03-09 | Stop reason: HOSPADM

## 2020-03-05 RX ORDER — DIPHENHYDRAMINE HYDROCHLORIDE 50 MG/ML
12.5 INJECTION, SOLUTION INTRAMUSCULAR; INTRAVENOUS
Status: DISCONTINUED | OUTPATIENT
Start: 2020-03-05 | End: 2020-03-05 | Stop reason: HOSPADM

## 2020-03-05 RX ORDER — OXYCODONE AND ACETAMINOPHEN 5; 325 MG/1; MG/1
1 TABLET ORAL
Status: DISCONTINUED | OUTPATIENT
Start: 2020-03-05 | End: 2020-03-05 | Stop reason: SDUPTHER

## 2020-03-05 RX ORDER — LIDOCAINE HYDROCHLORIDE 10 MG/ML
0.1 INJECTION, SOLUTION EPIDURAL; INFILTRATION; INTRACAUDAL; PERINEURAL AS NEEDED
Status: DISCONTINUED | OUTPATIENT
Start: 2020-03-05 | End: 2020-03-05 | Stop reason: HOSPADM

## 2020-03-05 RX ORDER — PROPOFOL 10 MG/ML
INJECTION, EMULSION INTRAVENOUS AS NEEDED
Status: DISCONTINUED | OUTPATIENT
Start: 2020-03-05 | End: 2020-03-05 | Stop reason: HOSPADM

## 2020-03-05 RX ORDER — HYDROMORPHONE HYDROCHLORIDE 1 MG/ML
0.5 INJECTION, SOLUTION INTRAMUSCULAR; INTRAVENOUS; SUBCUTANEOUS
Status: ACTIVE | OUTPATIENT
Start: 2020-03-05 | End: 2020-03-06

## 2020-03-05 RX ORDER — KETAMINE HYDROCHLORIDE 10 MG/ML
INJECTION, SOLUTION INTRAMUSCULAR; INTRAVENOUS AS NEEDED
Status: DISCONTINUED | OUTPATIENT
Start: 2020-03-05 | End: 2020-03-05 | Stop reason: HOSPADM

## 2020-03-05 RX ORDER — AMLODIPINE BESYLATE 5 MG/1
2.5 TABLET ORAL DAILY
Status: DISCONTINUED | OUTPATIENT
Start: 2020-03-05 | End: 2020-03-06

## 2020-03-05 RX ORDER — ACETAMINOPHEN 325 MG/1
650 TABLET ORAL EVERY 6 HOURS
Status: DISCONTINUED | OUTPATIENT
Start: 2020-03-05 | End: 2020-03-09 | Stop reason: HOSPADM

## 2020-03-05 RX ORDER — FENTANYL CITRATE 50 UG/ML
25 INJECTION, SOLUTION INTRAMUSCULAR; INTRAVENOUS
Status: DISCONTINUED | OUTPATIENT
Start: 2020-03-05 | End: 2020-03-05 | Stop reason: HOSPADM

## 2020-03-05 RX ORDER — ENOXAPARIN SODIUM 100 MG/ML
40 INJECTION SUBCUTANEOUS DAILY
Status: DISCONTINUED | OUTPATIENT
Start: 2020-03-06 | End: 2020-03-05 | Stop reason: CLARIF

## 2020-03-05 RX ORDER — POLYETHYLENE GLYCOL 3350 17 G/17G
17 POWDER, FOR SOLUTION ORAL DAILY
Status: DISCONTINUED | OUTPATIENT
Start: 2020-03-05 | End: 2020-03-09 | Stop reason: HOSPADM

## 2020-03-05 RX ORDER — FERROUS SULFATE, DRIED 160(50) MG
1 TABLET, EXTENDED RELEASE ORAL
Status: DISCONTINUED | OUTPATIENT
Start: 2020-03-06 | End: 2020-03-09 | Stop reason: HOSPADM

## 2020-03-05 RX ORDER — ASPIRIN 81 MG/1
81 TABLET ORAL DAILY
Status: DISCONTINUED | OUTPATIENT
Start: 2020-03-05 | End: 2020-03-09 | Stop reason: HOSPADM

## 2020-03-05 RX ORDER — NEOSTIGMINE METHYLSULFATE 1 MG/ML
INJECTION, SOLUTION INTRAVENOUS AS NEEDED
Status: DISCONTINUED | OUTPATIENT
Start: 2020-03-05 | End: 2020-03-05 | Stop reason: HOSPADM

## 2020-03-05 RX ORDER — OXYCODONE HYDROCHLORIDE 5 MG/1
5 TABLET ORAL
Status: DISCONTINUED | OUTPATIENT
Start: 2020-03-05 | End: 2020-03-09 | Stop reason: HOSPADM

## 2020-03-05 RX ORDER — AMOXICILLIN 250 MG
1 CAPSULE ORAL 2 TIMES DAILY
Status: DISCONTINUED | OUTPATIENT
Start: 2020-03-05 | End: 2020-03-09 | Stop reason: HOSPADM

## 2020-03-05 RX ORDER — PROPOFOL 10 MG/ML
INJECTION, EMULSION INTRAVENOUS
Status: DISCONTINUED | OUTPATIENT
Start: 2020-03-05 | End: 2020-03-05 | Stop reason: HOSPADM

## 2020-03-05 RX ORDER — HYDRALAZINE HYDROCHLORIDE 20 MG/ML
10 INJECTION INTRAMUSCULAR; INTRAVENOUS
Status: DISCONTINUED | OUTPATIENT
Start: 2020-03-05 | End: 2020-03-09 | Stop reason: HOSPADM

## 2020-03-05 RX ORDER — ROPIVACAINE HYDROCHLORIDE 5 MG/ML
INJECTION, SOLUTION EPIDURAL; INFILTRATION; PERINEURAL AS NEEDED
Status: DISCONTINUED | OUTPATIENT
Start: 2020-03-05 | End: 2020-03-05 | Stop reason: HOSPADM

## 2020-03-05 RX ORDER — ACETAMINOPHEN 10 MG/ML
INJECTION, SOLUTION INTRAVENOUS AS NEEDED
Status: DISCONTINUED | OUTPATIENT
Start: 2020-03-05 | End: 2020-03-05 | Stop reason: HOSPADM

## 2020-03-05 RX ORDER — MORPHINE SULFATE 10 MG/ML
2 INJECTION, SOLUTION INTRAMUSCULAR; INTRAVENOUS
Status: DISCONTINUED | OUTPATIENT
Start: 2020-03-05 | End: 2020-03-05 | Stop reason: HOSPADM

## 2020-03-05 RX ORDER — FACIAL-BODY WIPES
10 EACH TOPICAL DAILY PRN
Status: DISCONTINUED | OUTPATIENT
Start: 2020-03-05 | End: 2020-03-06

## 2020-03-05 RX ORDER — SODIUM CHLORIDE 0.9 % (FLUSH) 0.9 %
5-40 SYRINGE (ML) INJECTION AS NEEDED
Status: DISCONTINUED | OUTPATIENT
Start: 2020-03-05 | End: 2020-03-06

## 2020-03-05 RX ORDER — SODIUM CHLORIDE 0.9 % (FLUSH) 0.9 %
5-40 SYRINGE (ML) INJECTION EVERY 8 HOURS
Status: DISCONTINUED | OUTPATIENT
Start: 2020-03-05 | End: 2020-03-06

## 2020-03-05 RX ORDER — ONDANSETRON 2 MG/ML
4 INJECTION INTRAMUSCULAR; INTRAVENOUS AS NEEDED
Status: DISCONTINUED | OUTPATIENT
Start: 2020-03-05 | End: 2020-03-05 | Stop reason: HOSPADM

## 2020-03-05 RX ADMIN — PROPOFOL 30 MCG/KG/MIN: 10 INJECTION, EMULSION INTRAVENOUS at 11:10

## 2020-03-05 RX ADMIN — ATORVASTATIN CALCIUM 10 MG: 10 TABLET, FILM COATED ORAL at 21:16

## 2020-03-05 RX ADMIN — KETAMINE HYDROCHLORIDE 10 MG: 10 INJECTION, SOLUTION INTRAMUSCULAR; INTRAVENOUS at 11:09

## 2020-03-05 RX ADMIN — HYDROCHLOROTHIAZIDE 12.5 MG: 12.5 CAPSULE ORAL at 10:02

## 2020-03-05 RX ADMIN — LEVOTHYROXINE SODIUM 50 MCG: 50 TABLET ORAL at 07:18

## 2020-03-05 RX ADMIN — Medication 10 ML: at 10:06

## 2020-03-05 RX ADMIN — HYDRALAZINE HYDROCHLORIDE 10 MG: 20 INJECTION INTRAMUSCULAR; INTRAVENOUS at 01:28

## 2020-03-05 RX ADMIN — PHENYLEPHRINE HYDROCHLORIDE 80 MCG: 10 INJECTION INTRAVENOUS at 11:56

## 2020-03-05 RX ADMIN — ACETAMINOPHEN 650 MG: 325 TABLET ORAL at 18:06

## 2020-03-05 RX ADMIN — ACETAMINOPHEN 650 MG: 325 TABLET ORAL at 23:46

## 2020-03-05 RX ADMIN — WATER 2 G: 1 INJECTION INTRAMUSCULAR; INTRAVENOUS; SUBCUTANEOUS at 18:41

## 2020-03-05 RX ADMIN — PHENYLEPHRINE HYDROCHLORIDE 120 MCG: 10 INJECTION INTRAVENOUS at 12:02

## 2020-03-05 RX ADMIN — OXYCODONE 5 MG: 5 TABLET ORAL at 23:46

## 2020-03-05 RX ADMIN — ACETAMINOPHEN 1000 MG: 10 INJECTION, SOLUTION INTRAVENOUS at 11:16

## 2020-03-05 RX ADMIN — PROPOFOL 30 MG: 10 INJECTION, EMULSION INTRAVENOUS at 11:10

## 2020-03-05 RX ADMIN — GLYCOPYRROLATE 0.4 MG: 0.2 INJECTION, SOLUTION INTRAMUSCULAR; INTRAVENOUS at 12:04

## 2020-03-05 RX ADMIN — SODIUM CHLORIDE 125 ML/HR: 900 INJECTION, SOLUTION INTRAVENOUS at 18:09

## 2020-03-05 RX ADMIN — Medication 2.5 MG: at 12:04

## 2020-03-05 RX ADMIN — KETAMINE HYDROCHLORIDE 10 MG: 10 INJECTION, SOLUTION INTRAMUSCULAR; INTRAVENOUS at 11:25

## 2020-03-05 RX ADMIN — ACETAMINOPHEN 650 MG: 325 TABLET ORAL at 07:18

## 2020-03-05 RX ADMIN — SODIUM CHLORIDE, POTASSIUM CHLORIDE, SODIUM LACTATE AND CALCIUM CHLORIDE: 600; 310; 30; 20 INJECTION, SOLUTION INTRAVENOUS at 11:00

## 2020-03-05 RX ADMIN — PROPOFOL 40 MG: 10 INJECTION, EMULSION INTRAVENOUS at 11:09

## 2020-03-05 RX ADMIN — PHENYLEPHRINE HYDROCHLORIDE 80 MCG: 10 INJECTION INTRAVENOUS at 11:42

## 2020-03-05 RX ADMIN — Medication 10 ML: at 07:19

## 2020-03-05 RX ADMIN — PHENYLEPHRINE HYDROCHLORIDE 80 MCG: 10 INJECTION INTRAVENOUS at 11:54

## 2020-03-05 RX ADMIN — ROCURONIUM BROMIDE 20 MG: 10 INJECTION INTRAVENOUS at 11:10

## 2020-03-05 RX ADMIN — KETAMINE HYDROCHLORIDE 10 MG: 50 INJECTION, SOLUTION INTRAMUSCULAR; INTRAVENOUS at 11:08

## 2020-03-05 RX ADMIN — FENTANYL CITRATE 25 MCG: 50 INJECTION INTRAMUSCULAR; INTRAVENOUS at 13:11

## 2020-03-05 RX ADMIN — FENTANYL CITRATE 25 MCG: 50 INJECTION INTRAMUSCULAR; INTRAVENOUS at 12:46

## 2020-03-05 RX ADMIN — ONDANSETRON HYDROCHLORIDE 4 MG: 2 INJECTION, SOLUTION INTRAMUSCULAR; INTRAVENOUS at 12:03

## 2020-03-05 RX ADMIN — WATER 2 G: 1 INJECTION INTRAMUSCULAR; INTRAVENOUS; SUBCUTANEOUS at 11:19

## 2020-03-05 RX ADMIN — SENNOSIDES AND DOCUSATE SODIUM 1 TABLET: 8.6; 5 TABLET ORAL at 18:06

## 2020-03-05 NOTE — ANESTHESIA PREPROCEDURE EVALUATION
Relevant Problems No relevant active problems Anesthetic History No history of anesthetic complications Review of Systems / Medical History Patient summary reviewed, nursing notes reviewed and pertinent labs reviewed Pulmonary Within defined limits Neuro/Psych CVA TIA Cardiovascular Hypertension Dysrhythmias Hyperlipidemia Exercise tolerance: <4 METS Comments: Palpitations First degree AV block GI/Hepatic/Renal 
  
 
 
Renal disease: CRI Endo/Other Hypothyroidism Other Findings Comments: Post herpetic neuralgia Physical Exam 
 
Airway Mallampati: II 
TM Distance: 4 - 6 cm Neck ROM: decreased range of motion Cardiovascular Rhythm: regular Rate: normal 
 
[facility] 
 
Dentition: Full upper dentures Comments: No lower teeth Pulmonary Breath sounds clear to auscultation Abdominal 
GI exam deferred Other Findings Anesthetic Plan ASA: 3 Anesthesia type: general 
 
Monitoring Plan: BIS Induction: Intravenous Anesthetic plan and risks discussed with: Patient

## 2020-03-05 NOTE — OP NOTES
Date of Procedure: 3/5/2020 PREOPERATIVE DIAGNOSIS: Right intertrochanteric hip fracture. POSTOPERATIVE DIAGNOSIS: Right intertrochanteric hip fracture. OPERATION: Right short cephalomedullary nail. ASSISTANT SURGEON: none ANESTHESIA: general  
COMPLICATIONS: None. SPECIMENS: None. DRAINS: None. ESTIMATED BLOOD LOSS: 50mL. IMPLANTS: Synthes short TFN- A  12mm x 240 mm with a 95mm lag screw and 34mm distal locking screw 125 degree neck angle OPERATIVE INDICATIONS: This is a 80 y.o. female who did sustain an intertrochanteric hip fracture on the right side. We did discuss nonoperative and operative management. For mobilization purposes, pain control, the ability to ambulate and prevent the sequelae of non-weightbearing restrictions for 6-12 weeks, the patient did request surgical intervention. We did discuss the risks of surgery which include but are not limited to infection, nerve or blood vessel damage, need for re-operation, death, disability, DVT, PE, postoperative pain, swelling and stiffness, hardware failure, hardware cut-out, need for subsequent procedures, organ dysfunction of all kinds and wound healing complications. The patient did freely consent for surgery. DESCRIPTION OF PROCEDURE IN DETAIL: After the correct site and side were identified by myself in the holding area, the patient was transported to the surgical suite where after successful induction of anesthesia, was placed on a fracture table where all bony prominences were padded. The contralateral leg was placed in a well leg gunn. The right leg was then placed in a traction boot. After an appropriate timeout and confirmation that antibiotics had been infused prior to any incision, x-rays were taken and a manual reduction was performed with the use of internal rotation as well as adduction of the intertrochanteric hip fracture.  Once I was satisfied with the positioning as well as the reduction, the left leg was then prepped and draped in the usual sterile orthopedic fashion. After a repeat timeout, an incision was made just proximal to the greater trochanter in line with the femur. The deep fascia was then sharply incised. This did allow for palpation of the greater trochanteric tip. I then used a guide pin for the trochanteric set to gain entry into the femoral canal. I did use orthogonal views to confirm good positioning of the start point. Once I was satisfied with the positioning of the pin, I did over-ream with the reamer and a soft tissue protector. With that, a nail was chosen and this was impacted into place with manual impaction only. There was no use of a reamer or mallet to insert the nail. Once I was satisfied with the depth of the nail, I then used the jig to make an incision at the level of the lag screw. I did incise the skin and fascia sharply in a longitudinal incision. The jig was then taken down to bone. A guide pin was then inserted into the femoral head through the nail in excellent tip apex distance. This was satisfactory on orthogonal views. The tip apex distance was well below 25 mm. With that, we measured and then drilled and tapped the femoral neck and head. I then placed the lag screw under direct fluoroscopic visualization. This did again have excellent tip apex distance with no penetration of the joint on orthogonal views. The locking mechanism was engaged at the proximal aspect of the nail. The compression device was used to compress down the intertrochanteric fracture line. The distal locking screw was then placed off of the jig, using a nick and spread method to gain entry into the thigh, and care was taken not to plunge medially with the drill. The depth was then measured and screw placed. Once I was satisfied with the positioning of the component as well as the fixation, final x-rays were taken. I therefore copiously irrigated all wounds with normal saline.  The deep fascia was closed with #1 Vicryl sutures. The skin was closed with 2-0 Vicryl and 3-0 Monocryl. Dermabond and Steri-Strips were applied. The patient was then awoken and taken to PACU in stable condition with no obvious intraoperative complications. POSTOPERATIVE PLAN: The patient will be touch down weight bearing. Anticoagulation will be per the primary team. There will be no hip restrictions. Dressings are to be changed PRN only. He will be seen in 2 weeks with xrays of the hip, then 6 weeks for standard fracture follow up.

## 2020-03-05 NOTE — H&P
Hospitalist Admission Note NAME:  Yovana Mohamud :   1912 MRN:   [id number] Date of admit: 3/4/2020 PCP: Cristofer Moe MD 
 
Assessment/Plan:  
 
Right hip fracture s/p mechanical fall POA Yair Alonzo at home, no LOC Not able to get up, called granddaughter X-rays Orthopedics consult for management decisions Pain control DVT prophylaxis per orthopedics Still lives alone, does her ADLs Pre-op cardiac risk assessment POA Pt does not have a lot of cardiac risk factors, but given her age of 80, I think she is higher risk than the index indicates This has to be balanced against the risk of bedbound status if the fracture is not repaired Pt evaluated using revised cardiac risk index and is felt to be moderate, high cardiovascular risk for intermediate  
risk surgery with a 1-2.4% risk for major complications based on these criteria. This risk has been discussed with the patient 
and pt wishes to proceed. Plan for surgery without further cardiac testing if this risk is acceptable per surgery and anesthesia. Further risk reduction will involve medical management of other comorbid conditions in the perioperative period. Essential HTN POA uncontrolled Likely exacerbated by pain from the fracture Continue HCTZ Add low dose po norvasc PRN hydralazine Stage 3 chronic kidney disease POA Baseline creatinine ~ 1.5, now slightly increased at 1.7 Recheck BMP in AM 
 
Hypothyroidism POA Continue po replacement History of shingles and post-herpetic neuralgia POA Hold nortryptiline for now Body mass index is 18.79 kg/m². Given the patient's current clinical presentation, I have a high level of concern for decompensation if discharged from the emergency department. My assessment of this patient's clinical condition and my plan of care is as noted above. DVT prophylaxis per orthopedics Code status: Full code, limited conversation given her decreased hearing, I had trouble communicating with her She said she would want everything done if we thought it would help Will address again in Am when granddaughter is present NOK: granddaughter History CHIEF COMPLAINT: \"I fell today and hurt my right hip\" HISTORY OF PRESENT ILLNESS: 
 
8 year old WF Alert and oriented, lives by herself and does her own ADLs Very hard of hearing, I had some difficulty communicating with her Says recently she has been feeling well, no recent illnesses or chest pain or shortness of breath Today was walking with some plates from her down to her kitchen Lost her balance and fell to the floor \"I thought I hurt my buttocks the worst\" No headache, significant head trauma, loss of consciousness, palpitations, chest pain. Moderate pain in her right hip after the fall, was not able to get up Able to crawl back to her Nae Jeanette and call her granddaughter Brought to the emergency room Emergency  Room Markedly hypertensive 218/97 Right leg was externally rotated and shortened, pain on motion of the hip Right hip x-ray showed an acute comminuted intertrochanteric fracture of the right femur We were called to admit the patient Past Medical History:  
Diagnosis Date  Chronic kidney disease  CKD (chronic kidney disease) stage 3, GFR 30-59 ml/min ([facility]) 2010  Hypercholesteremia  Hypertension  Post herpetic neuralgia  Stroke ([facility] [address]  Thyroid disease Past Surgical History:  
Procedure Laterality Date  HX APPENDECTOMY  HX HYSTERECTOMY    
 one ovary out Social History Tobacco Use  Smoking status: Never Smoker  Smokeless tobacco: Never Used Substance Use Topics  Alcohol use: No  
  
 
Family History Problem Relation Age of Onset  Cancer Father   
1 daughter who  at age 80 of complications from a paraesophageal hernia repair Allergies Allergen Reactions  Lyrica [Pregabalin] Other (comments) Couldn't function  Macrobid [Nitrofurantoin Monohyd/M-Cryst] Other (comments)  
  sick  Neurontin [Gabapentin] Other (comments) groggy Prior to Admission medications Medication Sig Start Date End Date Taking? Authorizing Provider  
hydroCHLOROthiazide (MICROZIDE) 12.5 mg capsule TAKE 1 CAPSULE BY MOUTH ONCE DAILY 1/15/20   En Stoll MD  
multivit-min-FA-lycopen-lutein (CENTRUM SILVER) 1.8-083-531 mg-mcg-mcg tab Take  by mouth. Provider, Historical  
simvastatin (ZOCOR) 20 mg tablet TAKE ONE TABLET BY MOUTH NIGHTLY 9/20/19   En Stoll MD  
levothyroxine (SYNTHROID) 50 mcg tablet TAKE 2 TABLETS BY MOUTH ON SATURDAY AND SUNDAY AND TAKE 1 TABLET BY MOUTH THE OTHER 5 DAYS 8/22/19   En Stoll MD  
nortriptyline (PAMELOR) 10 mg capsule TAKE 1 TO 2 CAPSULES BY MOUTH AT BEDTIME FOR  SHINGLES  PAIN 6/27/19   Kerry Tinsley, NP  
vit C,W-Rb-ozjai-lutein-zeaxan (PRESERVISION AREDS-2) 100-027-84-1 mg-unit-mg-mg cap capsule Take 1 Cap by mouth two (2) times daily (with meals). Provider, Historical  
aspirin delayed-release 81 mg tablet Take 1 Tab by mouth daily. 5/25/17   En Stoll MD  
polyethylene glycol (MIRALAX) 17 gram/dose powder Take 17 g by mouth two (2) times a day. 1 tablespoon with 8 oz of water daily 10/28/16   Binta Nation, [address]  
acetaminophen (TYLENOL EXTRA STRENGTH) 500 mg tablet Take  by mouth every six (6) hours as needed. Provider, Historical  
 
 
Review of symptoms: 
   POSITIVE= Bold  Negative = not bold General:  fever, chills, sweats Eyes:    blurred vision, eye pain, double vision ENT:    Coryza, sore throat, trouble swallowing Respiratory:   cough, sputum, SOB Cardiology:   chest pain, orthopnea, PND, edema Gastrointestinal:  abdominal pain , N/V, diarrhea, constipation, melena or BRBPR Genitourinary:  Urgency, dysuria, hematuria Muskuloskeletal :  Joint redness, right hip pain , myalgias Hematology:  easy bruising, nose or gum bleeding Dermatological: rash, ulceration Endocrine:   Polyuria or polydipsia, heat or hold intolerance Neurological:  Headache, focal motor or sensory changes Speech difficulties, memory loss Psychological: depression, agitation Objective: VITALS:   
Patient Vitals for the past 24 hrs: 
 Temp Pulse Resp BP SpO2  
20 2230  94 27 (!) 202/92 98 % 20 2148 98.4 °F (36.9 °C) 94 19 (!) 218/97 99 % Temp (24hrs), Av.4 °F (36.9 °C), Min:98.4 °F (36.9 °C), Max:98.4 °F (36.9 °C) O2 Device: Room air Wt Readings from Last 12 Encounters:  
20 54.4 kg (120 lb)  
20 54.9 kg (121 lb) 20 54.9 kg (121 lb) 20 55.6 kg (122 lb 9.6 oz) 20 56.7 kg (125 lb) 19 56.7 kg (125 lb) 10/24/19 55.8 kg (123 lb) 10/17/19 55.8 kg (123 lb)  
19 54.4 kg (120 lb)  
19 57.1 kg (125 lb 12.8 oz)  
18 56.2 kg (124 lb)  
18 57 kg (125 lb 9.6 oz) PHYSICAL EXAM:  
General:    Alert, cooperative in no distress HEENT: Normocephalic, atraumatic    PERRL,  Sclera no icterus Nasal mucosa without masses or discharge  Very [address] Oropharynx without erythema or exudate  No thrush  Pink MM Neck:  No meningismus, trachea midline, no carotid bruits Thyroid not enlarged, no nodules or tenderness Lungs:   Clear to auscultation bilaterally. No wheezing or rales No accessory muscle use or retractions. Heart:   Regular rate and rhythm,  no murmur or gallop. No LE edema Abdomen:   Soft, non-tender. Not distended. Bowel sounds normal.  
  No masses, No Hepatosplenomegaly, No Rebound or guarding Lymph nodes: No cervical or inguinal VIKKI Musculoskeletal:  Right leg shortened and externally rotated. No Cyanosis or clubbing Skin:      No rashes Not Jaundiced   No nodules or thickening Neurologic: Alert and oriented X 3, follows commands Cranial nerves 2 to 12 intact Symmetric motor strength bilaterally LAB DATA REVIEWED:   
Recent Results (from the past 12 hour(s)) SAMPLES BEING HELD Collection Time: 03/04/20 10:04 PM  
Result Value Ref Range SAMPLES BEING HELD  1 LAV, 1 PST, 1 RED, 1 EMILIA   
 COMMENT Add-on orders for these samples will be processed based on acceptable specimen integrity and analyte stability, which may vary by analyte. CBC WITH AUTOMATED DIFF Collection Time: 03/04/20 10:04 PM  
Result Value Ref Range WBC 8.0 3.6 - 11.0 K/uL  
 RBC 3.95 3.80 - 5.20 M/uL  
 HGB 11.9 11.5 - 16.0 g/dL HCT 37.6 35.0 - 47.0 % MCV 95.2 80.0 - 99.0 FL  
 MCH 30.1 26.0 - 34.0 PG  
 MCHC 31.6 30.0 - 36.5 g/dL  
 RDW 13.5 11.5 - 14.5 % PLATELET 183 087 - 065 K/uL MPV 10.8 8.9 - 12.9 FL  
 NRBC 0.0 0  WBC ABSOLUTE NRBC 0.00 0.00 - 0.01 K/uL NEUTROPHILS 77 (H) 32 - 75 % LYMPHOCYTES 14 12 - 49 % MONOCYTES 8 5 - 13 % EOSINOPHILS 1 0 - 7 % BASOPHILS 0 0 - 1 % IMMATURE GRANULOCYTES 0 0.0 - 0.5 % ABS. NEUTROPHILS 6.1 1.8 - 8.0 K/UL  
 ABS. LYMPHOCYTES 1.1 0.8 - 3.5 K/UL  
 ABS. MONOCYTES 0.7 0.0 - 1.0 K/UL  
 ABS. EOSINOPHILS 0.1 0.0 - 0.4 K/UL  
 ABS. BASOPHILS 0.0 0.0 - 0.1 K/UL  
 ABS. IMM. GRANS. 0.0 0.00 - 0.04 K/UL  
 DF AUTOMATED METABOLIC PANEL, COMPREHENSIVE Collection Time: 03/04/20 10:04 PM  
Result Value Ref Range Sodium 137 136 - 145 mmol/L Potassium 4.1 3.5 - 5.1 mmol/L Chloride 102 97 - 108 mmol/L  
 CO2 32 21 - 32 mmol/L Anion gap 3 (L) 5 - 15 mmol/L Glucose 112 (H) 65 - 100 mg/dL BUN 36 (H) 6 - 20 MG/DL Creatinine 1.70 (H) 0.55 - 1.02 MG/DL  
 BUN/Creatinine ratio 21 (H) 12 - 20 GFR est AA 33 (L) >60 ml/min/1.73m2 GFR est non-AA 27 (L) >60 ml/min/1.73m2 Calcium 9.2 8.5 - 10.1 MG/DL Bilirubin, total 0.3 0.2 - 1.0 MG/DL  
 ALT (SGPT) 25 12 - 78 U/L  
 AST (SGOT) 26 15 - 37 U/L Alk. phosphatase 104 45 - 117 U/L Protein, total 7.6 6.4 - 8.2 g/dL Albumin 3.5 3.5 - 5.0 g/dL Globulin 4.1 (H) 2.0 - 4.0 g/dL A-G Ratio 0.9 (L) 1.1 - 2.2 CXR read by radiology and reviewed by myself FINDINGS: 
AP portable view of the chest demonstrates a normal cardiomediastinal 
silhouette. There is no edema, effusion, consolidation, or pneumothorax. The 
osseous structures are unremarkable. IMPRESSION: 
No acute process. Right hip X-rays read by radiology FINDINGS: 
AP view of the pelvis and frog-leg view of the right hip views in conjunction 
with AP and lateral views of the right femur demonstrate an acute comminuted 
intertrochanteric fracture of the right femur. Associated varus angulation and 
slight foreshortening. No dislocation. Pubic symphysis is obscured by stool. Sacroiliac joints grossly intact. Sclerosis in the distal femoral diaphysis 
suggests prior bone infarct. No distal femur fracture. IMPRESSION: 
Acute comminuted intertrochanteric fracture right femur as above. Right femur X-ray read by radiology FINDINGS: 
AP view of the pelvis and frog-leg view of the right hip views in conjunction 
with AP and lateral views of the right femur demonstrate an acute comminuted 
intertrochanteric fracture of the right femur. Associated varus angulation and 
slight foreshortening. No dislocation. Pubic symphysis is obscured by stool. Sacroiliac joints grossly intact. Sclerosis in the distal femoral diaphysis 
suggests prior bone infarct. No distal femur fracture. IMPRESSION: 
Acute comminuted intertrochanteric fracture right femur as above. I saw the patient personally, took a history and did a complete physical exam at the bedside. I performed complex decision making in coming up with a diagnostic and treatment plan for the patient. I reviewed the patient's past medical records, current laboratory and radiology results, and actual Xray films/EKG. I have also discussed this case with the involved ED physician.  
 
Care Plan discussed with: 
 Patient,  ED Doc Risk of deterioration:  High 
 
 
 
Catalina Ball MD

## 2020-03-05 NOTE — PERIOP NOTES
TRANSFER - IN REPORT: 
 
Verbal report received from [facility] on Margarito Morales  being received from [phone] for ordered procedure Report consisted of patients Situation, Background, Assessment and  
Recommendations(SBAR). Information from the following report(s) SBAR, ED Summary, Intake/Output and MAR was reviewed with the receiving nurse. Opportunity for questions and clarification was provided. Assessment completed upon patients arrival to unit and care assumed.

## 2020-03-05 NOTE — PROGRESS NOTES
Bedside and Verbal shift change report given to [address] (oncoming nurse) by Kim Tolbert (offgoing nurse). Report included the following information SBAR, Kardex, Intake/Output, MAR and Recent Results.

## 2020-03-05 NOTE — PROGRESS NOTES
Ortho / Neurosurgery NP Note POD# 0  s/p RIGHT FEMORAL INTERTROCHANTERIC NAIL INSERTION Pt resting in bed, no family presnet. Drowsy but awakes to voice. AOx3 No complaints. Denies pain. VSS Afebrile. 2L O2 
/65, HR 80, sats 100% Voiding status: Chandler Output (mL) Last Bowel Movement Date: 03/04/20 (03/05/20 2161) Labs Lab Results Component Value Date/Time HGB 11.9 03/04/2020 10:04 PM  
  
Lab Results Component Value Date/Time INR 1.0 03/04/2020 10:04 PM  
  
 
Recent Glucose Results:  
Lab Results Component Value Date/Time  (H) 03/05/2020 03:36 AM  
  (H) 03/04/2020 10:04 PM  
 
 
 
Body mass index is 18.79 kg/m². : A BMI > 30 is classified as obesity and > 40 is classified as morbid obesity. AOx3 - drowsy 4x4 and tegaderm dressings c.d.i Cryotherapy in place over incision Calves soft and supple; No pain with passive stretch Sensation and motor intact - PF/DF/EHL intact 4/5 SCDs for mechanical DVT proph while in bed PLAN: 
1) PT BID, OT - TTWB 2) Heparin 5000units BID and ASA 81 Daily for DVT Prophylaxis - anti-coagulation per IM 3) Pain control - limit narcotics given age, scheduled tylenol, utilize ice packs 4) Readniess for discharge: 
   [x] Vital Signs stable  
 [] Hgb stable - will check in am  
 [] + Voiding - chandler [x] Wound intact, drainage minimal  
 [] Tolerating PO intake   
 [] Cleared by PT (OT if applicable) [] Stair training completed (if applicable) [] Independent / Contact Guard Assist (household distance) [] Bed mobility [] Car transfers  
  [] ADLs [x] Adequate pain control on oral medication alone Monitor overnight. Discharge planning pending progress with PT/OT tomorrow.   
 
Kacie Garcia NP

## 2020-03-05 NOTE — ED NOTES
Patient becoming more normotensive and reports feeling better. Will hold amlodipine per orders from Dr. Chapincito Lazo.

## 2020-03-05 NOTE — PROGRESS NOTES
Hospitalist Progress Note NAME: Margarito Morales :  1912 MRN:  [id number] Assessment / Plan: 
 
Acute comminuted intertrochanteric fracture right femur s/p mechanical fall POA 
-Fell at home, no LOC 
-Not able to get up, called granddaughter 
-X-ray of r hip \"Acute comminuted intertrochanteric fracture right femur\" -Orthopedics consult for management decisions -s/p Right short cephalomedullary nail  
-Pain control -DVT prophylaxis per orthopedics -Still lives alone, does her ADLs 
  
Pre-op cardiac risk assessment POA by admitting physician \"Pt does not have a lot of cardiac risk factors, but given her age of 80, I think she is higher risk than the index indicates This has to be balanced against the risk of bedbound status if the fracture is not repaired Pt evaluated using revised cardiac risk index and is felt to be moderate, high cardiovascular risk for intermediate  
risk surgery with a 1-2.4% risk for major complications based on these criteria. This risk has been discussed with the patient 
and pt wishes to proceed. Plan for surgery without further cardiac testing if this risk is acceptable per surgery and anesthesia. Further risk reduction will involve medical management of other comorbid conditions in the perioperative period\". 
  
Essential HTN POA uncontrolled 
-Likely exacerbated by pain from the fracture 
-BP on lower side after OR 
-hold  HCTZ 
-hold norvasc 
-PRN hydralazine 
  
Stage 3 chronic kidney disease POA 
-Baseline creatinine ~ 1.5, now slightly increased at 1.7 
-Recheck BMP in AM 
  
Hypothyroidism POA 
-Continue po replacement 
  
History of shingles and post-herpetic neuralgia POA 
-Hold nortryptiline for now 
  Body mass index is 18.79 kg/m². Code status: DNR Prophylaxis: by ortho Recommended Disposition: TBD Subjective: Chief Complaint / Reason for Physician Visit Acute comminuted intertrochanteric fracture right femur s/p mechanical fall 
Discussed with RN events overnight. Review of Systems: 
Symptom Y/N Comments  Symptom Y/N Comments Fever/Chills n   Chest Pain n   
Poor Appetite n   Edema n   
Cough    Abdominal Pain n   
Sputum    Joint Pain SOB/SMITH n   Pruritis/Rash Nausea/vomit    Tolerating PT/OT Diarrhea    Tolerating Diet Constipation    Other Could NOT obtain due to:   
 
Objective: VITALS:  
Last 24hrs VS reviewed since prior progress note. Most recent are: 
Patient Vitals for the past 24 hrs: 
 Temp Pulse Resp BP SpO2  
03/05/20 0756 97.6 °F (36.4 °C) (!) 114 18 118/56 98 % 03/05/20 0322 97.9 °F (36.6 °C) (!) 104 18 149/68 97 % 03/05/20 0240  (!) 111 26 139/69 100 % 03/05/20 0235  (!) 109 25 126/53 99 % 03/05/20 0225  (!) 111 (!) 31 121/58 100 % 03/05/20 0215  (!) 126 28 114/56 99 % 03/05/20 0206  (!) 132 (!) 34 (!) 84/43 99 % 03/05/20 0200  (!) 134 (!) 32 118/56   
03/05/20 0128  92  (!) 186/98   
03/05/20 0045  97 27 (!) 199/94 97 % 03/05/20 0030  95 26 (!) 166/95 97 % 03/05/20 0016  100 20 184/85 99 % 03/05/20 0000  95 22 168/79 97 % 03/04/20 2345  94 27 (!) 197/107 98 % 03/04/20 2230  94 27 (!) 202/92 98 % 03/04/20 2215  92 22 182/78 97 % 03/04/20 2148 98.4 °F (36.9 °C) 94 19 (!) 218/97 99 % No intake or output data in the 24 hours ending 03/05/20 0908 PHYSICAL EXAM: 
General: WD, WN. Alert, cooperative, no acute distress   
EENT:  EOMI. Anicteric sclerae. MMM Resp:  CTA bilaterally, no wheezing or rales. No accessory muscle use CV:  Regular  rhythm,  No edema GI:  Soft, Non distended, Non tender.  +Bowel sounds Neurologic:  Alert and oriented X 3, normal speech, Psych:   Good insight. Not anxious nor agitated Skin:  No rashes. No jaundice Reviewed most current lab test results and cultures  YES Reviewed most current radiology test results   YES Review and summation of old records today    NO 
 Reviewed patient's current orders and MAR    YES 
PMH/SH reviewed - no change compared to H&P 
________________________________________________________________________ Care Plan discussed with: 
  Comments Patient x Family RN x Care Manager Consultant Multidiciplinary team rounds were held today with , nursing, pharmacist and clinical coordinator. Patient's plan of care was discussed; medications were reviewed and discharge planning was addressed. ________________________________________________________________________ Total NON critical care TIME:  35   Minutes Total CRITICAL CARE TIME Spent:   Minutes non procedure based Comments >50% of visit spent in counseling and coordination of care    
________________________________________________________________________ Lennox Person, MD  
 
Procedures: see electronic medical records for all procedures/Xrays and details which were not copied into this note but were reviewed prior to creation of Plan. LABS: 
I reviewed today's most current labs and imaging studies. Pertinent labs include: 
Recent Labs 03/04/20 
2204 WBC 8.0 HGB 11.9 HCT 37.6  Recent Labs 03/05/20 
0336 03/04/20 
2204  137  
K 4.0 4.1  102 CO2 28 32 * 112* BUN 35* 36* CREA 1.57* 1.70* CA 9.0 9.2 ALB  --  3.5 TBILI  --  0.3 SGOT  --  26 ALT  --  25 INR  --  1.0 Signed: Lennox Person, MD

## 2020-03-05 NOTE — ED NOTES
Pt pants removed and pt placed in a brief with the purewick. Pt repositioned in bed with call bell in reach.

## 2020-03-05 NOTE — ANESTHESIA POSTPROCEDURE EVALUATION
Post-Anesthesia Evaluation and Assessment Patient: Gaye Whalen MRN: [id number]  SSN: [id number] YOB: 1912  Age: 80 y.o. Sex: female I have evaluated the patient and they are stable and ready for discharge from the PACU. Cardiovascular Function/Vital Signs Visit Vitals /49 (BP 1 Location: Right arm, BP Patient Position: At rest) Pulse 85 Temp 35.9 °C (96.6 °F) Resp 25 Ht 5' 7\" (1.702 m) Wt 54.4 kg (120 lb) SpO2 99% BMI 18.79 kg/m² Patient is status post General anesthesia for Procedure(s) with comments: 
RIGHT FEMORAL INTERTROCHANTERIC NAIL INSERTION - HANA FRACTURE TABLE; C-ARM; SYNTHES TFN. Nausea/Vomiting: None Postoperative hydration reviewed and adequate. Pain: 
Pain Scale 1: Visual (03/05/20 1400) Pain Intensity 1: 7 (03/05/20 1030) Managed Neurological Status:  
Neuro (WDL): Exceptions to WDL (03/05/20 1235) Neuro Neurologic State: Drowsy; Eyes open to voice (03/05/20 1235) Orientation Level: Oriented to person;Oriented to place;Oriented to situation (03/05/20 1235) Cognition: Follows commands (03/05/20 1235) Speech: Slurred (03/05/20 1235) LUE Motor Response: Purposeful;Weak (03/05/20 1235) LLE Motor Response: Purposeful;Weak (03/05/20 1235) RUE Motor Response: Purposeful;Weak (03/05/20 1235) RLE Motor Response: Purposeful;Weak (03/05/20 1235) At baseline Mental Status, Level of Consciousness: Alert and  oriented to person, place, and time Pulmonary Status:  
O2 Device: Nasal cannula (03/05/20 1430) Adequate oxygenation and airway patent Complications related to anesthesia: None Post-anesthesia assessment completed. No concerns Signed By: Nalini Culver MD   
 March 5, 2020

## 2020-03-05 NOTE — CONSULTS
Orthopedic CONSULT NOTE Subjective:  
 
Date of Consultation:  March 5, 2020 Arielle Yeboah is a 80 y.o. female who is being seen for right hip pain and inability to ambulate after fall. Injury occurred  yesterday while Pt. was working in her kitchen, lost balance and had glf . Workup has revealed right hip fracture . Patient's ambulatory status includes None and their living environment is home. Pt. Denies head trauma/LOC during injury. Pt. Is right hand dominant. Usually healthy no recent illness Known to DR Matt Steel treated her for recent broken toe Patient Active Problem List  
 Diagnosis Date Noted  Closed right hip fracture ([facility] [address] 03/04/2020  Closed fracture of proximal phalanx of left great toe, initial encounter 02/06/2020  Hypothyroidism due to acquired atrophy of thyroid 01/17/2017  Benign hypertension with chronic kidney disease, stage IV ([facility] [address] 06/18/2015  Palpitations 11/19/2014  Postherpetic neuralgia 05/17/2010  Cerebrovascular disease, arteriosclerotic, post-stroke 05/17/2010  Asymptomatic PVD (peripheral vascular disease) ([facility] [address] 05/17/2010  Hypercholesterolemia 05/17/2010 Family History Problem Relation Age of Onset  Cancer Father Social History Tobacco Use  Smoking status: Never Smoker  Smokeless tobacco: Never Used Substance Use Topics  Alcohol use: No  
 
Past Medical History:  
Diagnosis Date  Chronic kidney disease  CKD (chronic kidney disease) stage 3, GFR 30-59 ml/min ([facility]) 5/17/2010  Hypercholesteremia  Hypertension  Post herpetic neuralgia  Stroke ([facility] [address]  Thyroid disease Past Surgical History:  
Procedure Laterality Date  HX APPENDECTOMY  HX HYSTERECTOMY  1947  
 one ovary out Prior to Admission medications Medication Sig Start Date End Date Taking?  Authorizing Provider  
hydroCHLOROthiazide (MICROZIDE) 12.5 mg capsule TAKE 1 CAPSULE BY MOUTH ONCE DAILY 1/15/20   Dhaval Mercado MD  
multivit-min-FA-lycopen-lutein (CENTRUM SILVER) 5.0-530-971 mg-mcg-mcg tab Take  by mouth. Provider, Historical  
simvastatin (ZOCOR) 20 mg tablet TAKE ONE TABLET BY MOUTH NIGHTLY 9/20/19   Dhaval Mercado MD  
levothyroxine (SYNTHROID) 50 mcg tablet TAKE 2 TABLETS BY MOUTH ON SATURDAY AND SUNDAY AND TAKE 1 TABLET BY MOUTH THE OTHER 5 DAYS 8/22/19   Dhaval Mercado MD  
nortriptyline (PAMELOR) 10 mg capsule TAKE 1 TO 2 CAPSULES BY MOUTH AT BEDTIME FOR  SHINGLES  PAIN 6/27/19   Mireille Booth NP  
vit C,X-Tv-esspy-lutein-zeaxan (PRESERVISION AREDS-2) 596-837-63-1 mg-unit-mg-mg cap capsule Take 1 Cap by mouth two (2) times daily (with meals). Provider, Historical  
aspirin delayed-release 81 mg tablet Take 1 Tab by mouth daily. 5/25/17   Dhaval Mercado MD  
polyethylene glycol (MIRALAX) 17 gram/dose powder Take 17 g by mouth two (2) times a day. 1 tablespoon with 8 oz of water daily 10/28/16   [place]  
acetaminophen (TYLENOL EXTRA STRENGTH) 500 mg tablet Take  by mouth every six (6) hours as needed. Provider, Historical  
 
Current Facility-Administered Medications Medication Dose Route Frequency  sodium chloride (NS) flush 5-40 mL  5-40 mL IntraVENous Q8H  
 sodium chloride (NS) flush 5-40 mL  5-40 mL IntraVENous PRN  
 acetaminophen (TYLENOL) tablet 650 mg  650 mg Oral Q6H PRN  
 oxyCODONE-acetaminophen (PERCOCET) 5-325 mg per tablet 1 Tab  1 Tab Oral Q6H PRN  
 naloxone (NARCAN) injection 0.4 mg  0.4 mg IntraVENous PRN  
 ondansetron (ZOFRAN) injection 4 mg  4 mg IntraVENous Q4H PRN  
 bisacodyL (DULCOLAX) suppository 10 mg  10 mg Rectal DAILY PRN  
 aspirin delayed-release tablet 81 mg  81 mg Oral DAILY  hydroCHLOROthiazide (MICROZIDE) capsule 12.5 mg  12.5 mg Oral DAILY  polyethylene glycol (MIRALAX) packet 17 g  17 g Oral DAILY  atorvastatin (LIPITOR) tablet 10 mg  10 mg Oral QHS  levothyroxine (SYNTHROID) tablet 50 mcg  50 mcg Oral 6am  
 hydrALAZINE (APRESOLINE) 20 mg/mL injection 10 mg  10 mg IntraVENous Q6H PRN  
 amLODIPine (NORVASC) tablet 2.5 mg  2.5 mg Oral DAILY  0.9% sodium chloride infusion  75 mL/hr IntraVENous CONTINUOUS Current Outpatient Medications Medication Sig  
 hydroCHLOROthiazide (MICROZIDE) 12.5 mg capsule TAKE 1 CAPSULE BY MOUTH ONCE DAILY  multivit-min-FA-lycopen-lutein (CENTRUM SILVER) 0.4-300-250 mg-mcg-mcg tab Take  by mouth.  simvastatin (ZOCOR) 20 mg tablet TAKE ONE TABLET BY MOUTH NIGHTLY  levothyroxine (SYNTHROID) 50 mcg tablet TAKE 2 TABLETS BY MOUTH ON SATURDAY AND SUNDAY AND TAKE 1 TABLET BY MOUTH THE OTHER 5 DAYS  nortriptyline (PAMELOR) 10 mg capsule TAKE 1 TO 2 CAPSULES BY MOUTH AT BEDTIME FOR  SHINGLES  PAIN  
 vit C,N-Yu-ebsmn-lutein-zeaxan (PRESERVISION AREDS-2) 826-182-69-1 mg-unit-mg-mg cap capsule Take 1 Cap by mouth two (2) times daily (with meals).  aspirin delayed-release 81 mg tablet Take 1 Tab by mouth daily.  polyethylene glycol (MIRALAX) 17 gram/dose powder Take 17 g by mouth two (2) times a day. 1 tablespoon with 8 oz of water daily  acetaminophen (TYLENOL EXTRA STRENGTH) 500 mg tablet Take  by mouth every six (6) hours as needed. Allergies Allergen Reactions  Lyrica [Pregabalin] Other (comments) Couldn't function  Macrobid [Nitrofurantoin Monohyd/M-Cryst] Other (comments)  
  sick  Neurontin [Gabapentin] Other (comments) groggy Review of Systems:  A comprehensive review of systems was negative except for that written in the HPI. Mental Status: no dementia Objective:  
 
Patient Vitals for the past 8 hrs: 
 BP Temp Pulse Resp SpO2 Height Weight 03/05/20 0045 (!) 199/94  97 27 97 %    
03/05/20 0030 (!) 166/95  95 26 97 %    
03/05/20 0016 184/85  100 20 99 %    
03/05/20 0000 168/79  95 22 97 %    
03/04/20 2345 (!) 197/107  94 27 98 %   20 2230 (!) 202/92  94 27 98 %    
20 2215 182/78  92 22 97 %    
20 2148 (!) 218/97 98.4 °F (36.9 °C) 94 19 99 % 5' 7\" (1.702 m) 54.4 kg (120 lb) Temp (24hrs), Av.4 °F (36.9 °C), Min:98.4 °F (36.9 °C), Max:98.4 °F (36.9 °C) EXAM: alert, cooperative, no distress, oriented, normal, normal mood, clear to auscultation bilaterally, regular rate and rhythm, soft, non-tender. Bowel sounds normal. No masses,  no organomegaly, purpura / ecchymosis noted on face, trunk and extremities, Pt. Is TTP over right hip. Leg is short and rotated. ROM not done due to pain  . Imaging Review: XRStudy Result INDICATION:   Trauma 
  
COMPARISON: None 
  
FINDINGS: 
  
AP view of the pelvis and frog-leg view of the right hip views in conjunction 
with AP and lateral views of the right femur demonstrate an acute comminuted 
intertrochanteric fracture of the right femur. Associated varus angulation and 
slight foreshortening. No dislocation. Pubic symphysis is obscured by stool. Sacroiliac joints grossly intact. Sclerosis in the distal femoral diaphysis 
suggests prior bone infarct. No distal femur fracture. 
  
IMPRESSION IMPRESSION: 
Acute comminuted intertrochanteric fracture right femur as above Labs:  
Recent Results (from the past 24 hour(s)) SAMPLES BEING HELD Collection Time: 20 10:04 PM  
Result Value Ref Range SAMPLES BEING HELD  1 LAV, 1 PST, 1 RED, 1 EMILIA   
 COMMENT Add-on orders for these samples will be processed based on acceptable specimen integrity and analyte stability, which may vary by analyte. TYPE & SCREEN Collection Time: 20 10:04 PM  
Result Value Ref Range Crossmatch Expiration 2020 ABO/Rh(D) O NEGATIVE Antibody screen NEG   
CBC WITH AUTOMATED DIFF Collection Time: 20 10:04 PM  
Result Value Ref Range WBC 8.0 3.6 - 11.0 K/uL  
 RBC 3.95 3.80 - 5.20 M/uL  
 HGB 11.9 11.5 - 16.0 g/dL HCT 37.6 35.0 - 47.0 % MCV 95.2 80.0 - 99.0 FL  
 MCH 30.1 26.0 - 34.0 PG  
 MCHC 31.6 30.0 - 36.5 g/dL  
 RDW 13.5 11.5 - 14.5 % PLATELET 681 760 - 015 K/uL MPV 10.8 8.9 - 12.9 FL  
 NRBC 0.0 0  WBC ABSOLUTE NRBC 0.00 0.00 - 0.01 K/uL NEUTROPHILS 77 (H) 32 - 75 % LYMPHOCYTES 14 12 - 49 % MONOCYTES 8 5 - 13 % EOSINOPHILS 1 0 - 7 % BASOPHILS 0 0 - 1 % IMMATURE GRANULOCYTES 0 0.0 - 0.5 % ABS. NEUTROPHILS 6.1 1.8 - 8.0 K/UL  
 ABS. LYMPHOCYTES 1.1 0.8 - 3.5 K/UL  
 ABS. MONOCYTES 0.7 0.0 - 1.0 K/UL  
 ABS. EOSINOPHILS 0.1 0.0 - 0.4 K/UL  
 ABS. BASOPHILS 0.0 0.0 - 0.1 K/UL  
 ABS. IMM. GRANS. 0.0 0.00 - 0.04 K/UL  
 DF AUTOMATED METABOLIC PANEL, COMPREHENSIVE Collection Time: 03/04/20 10:04 PM  
Result Value Ref Range Sodium 137 136 - 145 mmol/L Potassium 4.1 3.5 - 5.1 mmol/L Chloride 102 97 - 108 mmol/L  
 CO2 32 21 - 32 mmol/L Anion gap 3 (L) 5 - 15 mmol/L Glucose 112 (H) 65 - 100 mg/dL BUN 36 (H) 6 - 20 MG/DL Creatinine 1.70 (H) 0.55 - 1.02 MG/DL  
 BUN/Creatinine ratio 21 (H) 12 - 20 GFR est AA 33 (L) >60 ml/min/1.73m2 GFR est non-AA 27 (L) >60 ml/min/1.73m2 Calcium 9.2 8.5 - 10.1 MG/DL Bilirubin, total 0.3 0.2 - 1.0 MG/DL  
 ALT (SGPT) 25 12 - 78 U/L  
 AST (SGOT) 26 15 - 37 U/L Alk. phosphatase 104 45 - 117 U/L Protein, total 7.6 6.4 - 8.2 g/dL Albumin 3.5 3.5 - 5.0 g/dL Globulin 4.1 (H) 2.0 - 4.0 g/dL A-G Ratio 0.9 (L) 1.1 - 2.2 PROTHROMBIN TIME + INR Collection Time: 03/04/20 10:04 PM  
Result Value Ref Range INR 1.0 0.9 - 1.1 Prothrombin time 10.4 9.0 - 11.1 sec Impression:  
 
Patient Active Problem List  
 Diagnosis Date Noted  Closed right hip fracture ([facility] [address] 03/04/2020  Closed fracture of proximal phalanx of left great toe, initial encounter 02/06/2020  Hypothyroidism due to acquired atrophy of thyroid 01/17/2017  Benign hypertension with chronic kidney disease, stage IV ([facility] [address] 06/18/2015  Palpitations 11/19/2014  Postherpetic neuralgia 05/17/2010  Cerebrovascular disease, arteriosclerotic, post-stroke 05/17/2010  Asymptomatic PVD (peripheral vascular disease) ([facility] [address] 05/17/2010  Hypercholesterolemia 05/17/2010 Active Problems: 
  Closed right hip fracture ([facility] [address] (3/4/2020) Plan:  
Admitted to medicine and cleared for surgery. Surgery is high risk Bedrest 
Npo Patient would like to talk to family later this morning before deciding on surgery. Discussed resks and alternatives Dr. Nahed Mcarthur aware and agree with above plan.  
 
 
Raine Bautista PA-C

## 2020-03-05 NOTE — PROGRESS NOTES
TRANSFER - IN REPORT: 
 
Verbal report received from [place] RN(name) on Henson Siddiqui  being received from ED(unit) for routine progression of care Report consisted of patients Situation, Background, Assessment and  
Recommendations(SBAR). Information from the following report(s) SBAR, Kardex, Intake/Output, MAR and Recent Results was reviewed with the receiving nurse. Opportunity for questions and clarification was provided. Assessment completed upon patients arrival to unit and care assumed.

## 2020-03-05 NOTE — ACP (ADVANCE CARE PLANNING)
Advance Care Planning Note NAME: Judge Dinh :  1912 MRN:  [id number] Date/Time:  3/5/2020 3:04 AM 
 
Active Diagnoses: 
Hospital Problems  Date Reviewed: 2020 Codes Class Noted POA Closed right hip fracture ([facility] [address] ICD-10-CM: S72.001A ICD-9-CM: 820.8  3/4/2020 Unknown These active diagnoses are of sufficient risk especially at age 80 that focused discussion on advance care planning is indicated in order to allow the patient to thoughtfully consider personal goals of care, and if situations arise that prevent the ability to personally give input, to ensure appropriate representation of their personal desires for different levels and aggressiveness of care. Discussion:  
 
Initially spoke with the patient as outlined in the admission H&P for some time about CODE STATUS She initially \"wanted everything done\" Nursing staff and then myself had further discussions with her about her wishes about code status She decided that she would not want to be on machine or get CPR We discussed that her age, likely any advanced life support measures would not help her return to meaningful life She felt it would be a \"easy way to go\" when the time comes and decided she did not want any CPR or cardioversion or intubation I told her I felt this is a reasonable decision given her age She would want other treatments to try and treat underlying medical conditions Code status addressed and wants to be a DNR / DNI. Patient wants central line and vasopressors if needed. Patient  would like to assign grandchildren  as the surrogate decision maker. Persons present and participating in discussion: Maribel Alva MD 
 
 
Time Spent:  
Total time spent face-to-face in education and discussion:   16  minutes.   
 
 
 
Corie Jordan MD  
Hospitalist

## 2020-03-05 NOTE — PROGRESS NOTES
Pharmacy  Enoxaparin (Lovenox®) Monitoring Indication: VTE px 
  
Current Dose: Enoxaparin 40 mg subcutaneously every 24 hours Creatinine Clearance (mL/min): 13 Current Weight: 54 Kg Labs: 
Recent Labs 03/05/20 
0336 03/04/20 
2204 CREA 1.57* 1.70* HGB  --  11.9 PLT  --  265 INR  --  1.0 Wt Readings from Last 1 Encounters:  
03/04/20 54.4 kg (120 lb) Ht Readings from Last 1 Encounters:  
03/04/20 170.2 cm (67\") Impression/Plan:  
Due to compromised renal function, will change enoxaparin to Heparin 5000 units SQ q12h, per protocol Thanks, 
Bhaskar Cazares 
 
  [email or web address]. pdf

## 2020-03-05 NOTE — PROGRESS NOTES
Bedside and Verbal shift change report given to Wyatt Harkins (oncoming nurse) by Ehsan Johnson (offgoing nurse). Report included the following information SBAR, Kardex, Intake/Output, MAR and Recent Results.

## 2020-03-05 NOTE — TELEPHONE ENCOUNTER
Pt's granddaughter, [facility], called to notify Dr. Xander Connolly that the pt fell yesterday and broke her hip. Pt had two titanium rods put into her leg. Pt is currently at [facility]. Please call [facility] with any questions at [phone] prior to 4pm if possible.

## 2020-03-05 NOTE — ED NOTES
Assumed care of pt via EMS. Pt was in the kitchen when she tripped and fell on her right side. Pt denies hitting her head or LOC. Pt the crawled to the living room where she was able to call her granddaughter. Pt has right hip pain with shortening of the right leg. Pt resting comfortably on stretcher in position of comfort. Pt in no apparent distress at this time. Call bell within reach. Side rails x2. Connected to monitor x3. Pt a/o x4. Stretcher locked in lowest position. Pt aware of plan to await for MD/PA-C/NP assessment, and pt/family verbalizes understanding. Will continue to monitor pt condition.

## 2020-03-05 NOTE — ED PROVIDER NOTES
EMERGENCY DEPARTMENT HISTORY AND PHYSICAL EXAM  
 
---------------------------------------------------------------------------- Please note that this dictation was completed with triptap, the computer voice recognition software. Quite often unanticipated grammatical, syntax, homophones, and other interpretive errors are inadvertently transcribed by the computer software. Please disregard these errors. Please excuse any errors that have escaped final proofreading 
---------------------------------------------------------------------------- 
 
 
Date: 3/4/2020 Patient Name: Nay Mcpherson History of Presenting Illness Chief Complaint Patient presents with  Fall Pt had a GLF tonight. Pt tripped and fell in the kitchen and crawled to the living room to call her granddaughter. Pt denies hitting her head or LOC.  Hip Pain Pt having right hip pain with shortening of the right leg per EMS History Provided By:  Patient, and family, EMS 
 
HPI: Nay Mcpherson is a 80 y.o. female, with significant pmhx of HTN, macular degeneration, XOL, thyroid dysfunction, who presents via EMS to the ED with c/o level fall just prior to presenting to emergency department. Patient lives at home by herself and notes that she was taking her dishes into the kitchen from the den when she suddenly fell. She typically uses a cane or walker to assist with ambulation but was not using it the time of her fall. Patient with complaint of left hip/thigh pain. Denies having hit her head or loss of consciousness. Patient was able to pull herself into the other room to call for assistance from her family that lives across the street. Patient denies any associated fevers, chills, CP, SOB, nausea, vomiting, diarrhea, abd pain, changes in BM, urinary sxs, or headache. Social Hx: denies tobacco  denies EtOH , denies Illicit Drugs There are no other complaints, changes, or physical findings at this time. PCP: Bisi Mansfield MD 
 
Allergies Allergen Reactions  Lyrica [Pregabalin] Other (comments) Couldn't function  Macrobid [Nitrofurantoin Monohyd/M-Cryst] Other (comments)  
  sick  Neurontin [Gabapentin] Other (comments) groggy Current Facility-Administered Medications Medication Dose Route Frequency Provider Last Rate Last Dose  sodium chloride (NS) flush 5-40 mL  5-40 mL IntraVENous Q8H Vlad Page MD      
 sodium chloride (NS) flush 5-40 mL  5-40 mL IntraVENous PRN Russell Hines MD      
 acetaminophen (TYLENOL) tablet 650 mg  650 mg Oral Q6H PRN Russell Hines MD      
 oxyCODONE-acetaminophen (PERCOCET) 5-325 mg per tablet 1 Tab  1 Tab Oral Q6H PRN Russell Hines MD      
 naloxone [facility]) injection 0.4 mg  0.4 mg IntraVENous PRN Russell Hines MD      
 ondansetron [facility]) injection 4 mg  4 mg IntraVENous Q4H PRN Russell Hines MD      
 bisacodyL (DULCOLAX) suppository 10 mg  10 mg Rectal DAILY PRN Russell Hines MD      
 aspirin delayed-release tablet 81 mg  81 mg Oral DAILY Vlad Page MD      
 hydroCHLOROthiazide (MICROZIDE) capsule 12.5 mg  12.5 mg Oral DAILY Vlad Page MD      
 polyethylene glycol [facility]) packet 17 g  17 g Oral DAILY Russell Hines MD      
 atorvastatin (LIPITOR) tablet 10 mg  10 mg Oral QHS Russell Hines MD      
 levothyroxine (SYNTHROID) tablet 50 mcg  50 mcg Oral 6am Vlad Page MD      
 hydrALAZINE (APRESOLINE) 20 mg/mL injection 10 mg  10 mg IntraVENous Q6H PRN Russell Hines MD   10 mg at 03/05/20 0128  amLODIPine (NORVASC) tablet 2.5 mg  2.5 mg Oral DAILY Vlad Page MD   Stopped at 03/05/20 1129  ceFAZolin (ANCEF) 2 g in sterile water (preservative free) 20 mL IV syringe  2 g IntraVENous ON CALL TO OR Helen, Yayo, PA-C      
 0.9% sodium chloride infusion  75 mL/hr IntraVENous CONTINUOUS Ciesla, Alma Trujillo MD 75 mL/hr at 03/05/20 0131 75 mL/hr at 03/05/20 0131 Current Outpatient Medications Medication Sig Dispense Refill  hydroCHLOROthiazide (MICROZIDE) 12.5 mg capsule TAKE 1 CAPSULE BY MOUTH ONCE DAILY 90 Cap 3  
 multivit-min-FA-lycopen-lutein (CENTRUM SILVER) 0.4-300-250 mg-mcg-mcg tab Take  by mouth.  simvastatin (ZOCOR) 20 mg tablet TAKE ONE TABLET BY MOUTH NIGHTLY 30 Tab 3  
 levothyroxine (SYNTHROID) 50 mcg tablet TAKE 2 TABLETS BY MOUTH ON SATURDAY AND SUNDAY AND TAKE 1 TABLET BY MOUTH THE OTHER 5 DAYS 40 Tab 11  
 nortriptyline (PAMELOR) 10 mg capsule TAKE 1 TO 2 CAPSULES BY MOUTH AT BEDTIME FOR  SHINGLES  PAIN 60 Cap 5  
 vit C,W-Cu-losur-lutein-zeaxan (PRESERVISION AREDS-2) [id number] mg-unit-mg-mg cap capsule Take 1 Cap by mouth two (2) times daily (with meals).  aspirin delayed-release 81 mg tablet Take 1 Tab by mouth daily. 90 Tab 3  polyethylene glycol (MIRALAX) 17 gram/dose powder Take 17 g by mouth two (2) times a day. 1 tablespoon with 8 oz of water daily 510 g 0  
 acetaminophen (TYLENOL EXTRA STRENGTH) 500 mg tablet Take  by mouth every six (6) hours as needed. Past History Past Medical History: 
Past Medical History:  
Diagnosis Date  Chronic kidney disease  CKD (chronic kidney disease) stage 3, GFR 30-59 ml/min ([facility]) 5/17/2010  Hypercholesteremia  Hypertension  Post herpetic neuralgia  Stroke ([facility] [address]  Thyroid disease Past Surgical History: 
Past Surgical History:  
Procedure Laterality Date  HX APPENDECTOMY  HX HYSTERECTOMY  1947  
 one ovary out Family History: 
Family History Problem Relation Age of Onset  Cancer Father Social History: 
Social History Tobacco Use  Smoking status: Never Smoker  Smokeless tobacco: Never Used Substance Use Topics  Alcohol use: No  
 Drug use: No  
 
 
Allergies: Allergies Allergen Reactions  Lyrica [Pregabalin] Other (comments) Couldn't function  Macrobid [Nitrofurantoin Monohyd/M-Cryst] Other (comments)  
  sick  Neurontin [Gabapentin] Other (comments) groggy Review of Systems Review of Systems Constitutional: Negative. Negative for fever. Eyes: Negative. Respiratory: Negative. Negative for shortness of breath. Cardiovascular: Negative for chest pain. Gastrointestinal: Negative for abdominal pain, nausea and vomiting. Endocrine: Negative. Genitourinary: Negative. Negative for difficulty urinating, dysuria and hematuria. Musculoskeletal: Positive for arthralgias. Skin: Negative. Neurological: Negative. Psychiatric/Behavioral: Negative for suicidal ideas. All other systems reviewed and are negative. Physical Exam  
Physical Exam 
Vitals signs and nursing note reviewed. Constitutional:   
   General: She is not in acute distress. Appearance: She is well-developed. She is not diaphoretic. HENT:  
   Head: Normocephalic and atraumatic. Ears:  
   Comments: Patient is hard of hearing Nose: Nose normal.  
Eyes:  
   General: No scleral icterus. Conjunctiva/sclera: Conjunctivae normal.  
Neck: Musculoskeletal: Normal range of motion. Trachea: No tracheal deviation. Cardiovascular:  
   Rate and Rhythm: Normal rate and regular rhythm. Heart sounds: Normal heart sounds. No murmur. No friction rub. Pulmonary:  
   Effort: Pulmonary effort is normal. No respiratory distress. Breath sounds: Normal breath sounds. No stridor. No wheezing or rales. Abdominal:  
   General: Bowel sounds are normal. There is no distension. Palpations: Abdomen is soft. Tenderness: There is no abdominal tenderness. There is no rebound. Musculoskeletal:  
   Right hip: She exhibits decreased range of motion, tenderness and bony tenderness.   
     Legs: 
 
   Comments: Patient currently holding her right lower extremity in a flexed, externally rotated position Skin: 
   General: Skin is warm and dry. Findings: No rash. Neurological:  
   Mental Status: She is alert and oriented to person, place, and time. Cranial Nerves: No cranial nerve deficit. Psychiatric:     
   Speech: Speech normal.     
   Behavior: Behavior normal.     
   Thought Content: Thought content normal.     
   Judgment: Judgment normal.  
 
 
 
 
Diagnostic Study Results Labs - Recent Results (from the past 12 hour(s)) SAMPLES BEING HELD Collection Time: 03/04/20 10:04 PM  
Result Value Ref Range SAMPLES BEING HELD  1 LAV, 1 PST, 1 RED, 1 EMILIA   
 COMMENT Add-on orders for these samples will be processed based on acceptable specimen integrity and analyte stability, which may vary by analyte. TYPE & SCREEN Collection Time: 03/04/20 10:04 PM  
Result Value Ref Range Crossmatch Expiration 03/07/2020 ABO/Rh(D) O NEGATIVE Antibody screen NEG   
CBC WITH AUTOMATED DIFF Collection Time: 03/04/20 10:04 PM  
Result Value Ref Range WBC 8.0 3.6 - 11.0 K/uL  
 RBC 3.95 3.80 - 5.20 M/uL  
 HGB 11.9 11.5 - 16.0 g/dL HCT 37.6 35.0 - 47.0 % MCV 95.2 80.0 - 99.0 FL  
 MCH 30.1 26.0 - 34.0 PG  
 MCHC 31.6 30.0 - 36.5 g/dL  
 RDW 13.5 11.5 - 14.5 % PLATELET 899 903 - 275 K/uL MPV 10.8 8.9 - 12.9 FL  
 NRBC 0.0 0  WBC ABSOLUTE NRBC 0.00 0.00 - 0.01 K/uL NEUTROPHILS 77 (H) 32 - 75 % LYMPHOCYTES 14 12 - 49 % MONOCYTES 8 5 - 13 % EOSINOPHILS 1 0 - 7 % BASOPHILS 0 0 - 1 % IMMATURE GRANULOCYTES 0 0.0 - 0.5 % ABS. NEUTROPHILS 6.1 1.8 - 8.0 K/UL  
 ABS. LYMPHOCYTES 1.1 0.8 - 3.5 K/UL  
 ABS. MONOCYTES 0.7 0.0 - 1.0 K/UL  
 ABS. EOSINOPHILS 0.1 0.0 - 0.4 K/UL  
 ABS. BASOPHILS 0.0 0.0 - 0.1 K/UL  
 ABS. IMM. GRANS. 0.0 0.00 - 0.04 K/UL  
 DF AUTOMATED METABOLIC PANEL, COMPREHENSIVE Collection Time: 03/04/20 10:04 PM  
Result Value Ref Range  Sodium 137 136 - 145 mmol/L  
 Potassium 4.1 3.5 - 5.1 mmol/L Chloride 102 97 - 108 mmol/L  
 CO2 32 21 - 32 mmol/L Anion gap 3 (L) 5 - 15 mmol/L Glucose 112 (H) 65 - 100 mg/dL BUN 36 (H) 6 - 20 MG/DL Creatinine 1.70 (H) 0.55 - 1.02 MG/DL  
 BUN/Creatinine ratio 21 (H) 12 - 20 GFR est AA 33 (L) >60 ml/min/1.73m2 GFR est non-AA 27 (L) >60 ml/min/1.73m2 Calcium 9.2 8.5 - 10.1 MG/DL Bilirubin, total 0.3 0.2 - 1.0 MG/DL  
 ALT (SGPT) 25 12 - 78 U/L  
 AST (SGOT) 26 15 - 37 U/L Alk. phosphatase 104 45 - 117 U/L Protein, total 7.6 6.4 - 8.2 g/dL Albumin 3.5 3.5 - 5.0 g/dL Globulin 4.1 (H) 2.0 - 4.0 g/dL A-G Ratio 0.9 (L) 1.1 - 2.2 PROTHROMBIN TIME + INR Collection Time: 03/04/20 10:04 PM  
Result Value Ref Range INR 1.0 0.9 - 1.1 Prothrombin time 10.4 9.0 - 11.1 sec URINALYSIS W/ REFLEX CULTURE Collection Time: 03/05/20  1:54 AM  
Result Value Ref Range Color YELLOW/STRAW Appearance CLEAR CLEAR Specific gravity 1.014 1.003 - 1.030    
 pH (UA) 7.0 5.0 - 8.0 Protein NEGATIVE  NEG mg/dL Glucose NEGATIVE  NEG mg/dL Ketone TRACE (A) NEG mg/dL Bilirubin NEGATIVE  NEG Blood NEGATIVE  NEG Urobilinogen 0.2 0.2 - 1.0 EU/dL Nitrites NEGATIVE  NEG Leukocyte Esterase NEGATIVE  NEG    
 WBC 0-4 0 - 4 /hpf  
 RBC 0-5 0 - 5 /hpf Epithelial cells FEW FEW /lpf Bacteria NEGATIVE  NEG /hpf  
 UA:UC IF INDICATED CULTURE NOT INDICATED BY UA RESULT CNI Hyaline cast 0-2 0 - 5 /lpf Radiologic Studies -  
XR CHEST SNGL V Final Result XR HIP RT W OR WO PELV 2-3 VWS Final Result IMPRESSION:  
Acute comminuted intertrochanteric fracture right femur as above. XR FEMUR RT 2 VS  
Final Result IMPRESSION:  
Acute comminuted intertrochanteric fracture right femur as above. CT Results  (Last 48 hours) None CXR Results  (Last 48 hours) 03/05/20 0012  XR CHEST SNGL V Final result  Impression:  IMPRESSION:  
 No acute process. Narrative:  INDICATION: Chest pain EXAM:  AP CHEST RADIOGRAPH  
   
COMPARISON: October 28, 2016 FINDINGS:  
   
AP portable view of the chest demonstrates a normal cardiomediastinal  
silhouette. There is no edema, effusion, consolidation, or pneumothorax. The  
osseous structures are unremarkable. Medical Decision Making I am the first provider for this patient. I reviewed the vital signs, available nursing notes, past medical history, past surgical history, family history and social history. Vital Signs-Reviewed the patient's vital signs. Patient Vitals for the past 12 hrs: 
 Temp Pulse Resp BP SpO2  
03/05/20 0240  (!) 111 26 139/69 100 % 03/05/20 0235  (!) 109 25 126/53 99 % 03/05/20 0225  (!) 111 (!) 31 121/58 100 % 03/05/20 0215  (!) 126 28 114/56 99 % 03/05/20 0206  (!) 132 (!) 34 (!) 84/43 99 % 03/05/20 0200  (!) 134 (!) 32 118/56   
03/05/20 0128  92  (!) 186/98   
03/05/20 0045  97 27 (!) 199/94 97 % 03/05/20 0030  95 26 (!) 166/95 97 % 03/05/20 0016  100 20 184/85 99 % 03/05/20 0000  95 22 168/79 97 % 03/04/20 2345  94 27 (!) 197/107 98 % 03/04/20 2230  94 27 (!) 202/92 98 % 03/04/20 2215  92 22 182/78 97 % 03/04/20 2148 98.4 °F (36.9 °C) 94 19 (!) 218/97 99 % Pulse Oximetry Analysis - 99% on RA Cardiac Monitor:  
Rate: 94 bpm 
Rhythm: nsr Records Reviewed: Nursing Notes, Old Medical Records and Ambulance Run Sheet Provider Notes (Medical Decision Making): DDX: 
Hip fracture, dislocation, femur fracture Plan: 
X-rays, analgesic Impression: 
Right-sided comminuted intratrochanteric fracture ED Course:  
Initial assessment performed. The patients presenting problems have been discussed, and they are in agreement with the care plan formulated and outlined with them. I have encouraged them to ask questions as they arise throughout their visit. I reviewed our electronic medical record system for any past medical records that were available that may contribute to the patients current condition, the nursing notes and and vital signs from today's visit Nursing notes will be reviewed as they become available in realtime while the pt has been in the ED. Yonatan Sabillon MD 
 
HYPERTENSION COUNSELING: 
Patient made aware of their elevated blood pressure and is instructed to follow up this week [facility] (should they be discharged.) Patient is counseled regarding consequences of chronic, uncontrolled hypertension including kidney disease, heart disease, stroke or even death. Patient states their understanding I personally reviewed pt's imaging. Official read by radiology noted above. Yonatan Sabillon MD 
 
PROGRESS NOTE: 
6707 Pt did have significant findings on x-ray for comminuted intertrochanteric fracture. Will consult with orthopedics and plan for admission with hospitalist. 
Yonatan Sabillno MD 
 
CONSULT NOTE:  
3:10 Monique Obando MD spoke with JAYCEE Cervantes, Specialty: Orthopedics Consulted Orthopedic team by perfect serve, Discussed pt's HPI and available diagnostic results thus far. Consultant recommends admission to hospitalist service with orthopedics consult. Yonatan Sabillon MD 
 
CONSULT NOTE:  
5521 Yonatan Sabillon MD spoke with Dr. Jewels Mccracken, Specialty: Lucius Mccracken due to right hip fracture. Discussed pt's HPI and available diagnostic results thus far. Expressed concerns for needed admission. Consultant will evaluate for admission. Yonatan Sabillon MD 
 
ADMISSION NOTE: 
5048 Patient is being admitted to the hospital by Dr. Jewels Mccracken. The results of their tests and reasons for their admission have been discussed with them and/or available family.   They convey agreement and understanding for the need to be admitted and for their admission diagnosis. Cody Anguiano MD 
 
 
 
  
 
Critical Care Time:  
 
none Diagnosis Clinical Impression: 1. Closed displaced intertrochanteric fracture of right femur, initial encounter ([facility] [address] PLAN: 
1. Admit to hospitalist service This note will not be viewable in [address]

## 2020-03-05 NOTE — ED NOTES
Patient reports feeling lightheaded after placing Martínez and emptying bladder. BP low on multiple readings. Dr. Qian Abrams made aware. Will provide supportive care and monitor VS.

## 2020-03-05 NOTE — PERIOP NOTES
TRANSFER - OUT REPORT: 
 
Verbal report given to [place], RN on Marylen Finders  being transferred to [facility], [address] [place] [address] for routine post - op Report consisted of patients Situation, Background, Assessment and  
Recommendations(SBAR). Information from the following report(s) SBAR, Kardex, OR Summary, Intake/Output, MAR and Alarm Parameters  was reviewed with the receiving nurse. Opportunity for questions and clarification was provided. Patient transported with: 
 O2 @ 2 liters Tech

## 2020-03-05 NOTE — CONSULTS
Date of Consult: 3/5/2020 CC: Right hip pain HPI:   This is a(n) 80 y.o. female who is status post fall from standing. The patient had the immediate onset of pain as well as inability to ambulate. Complains of right hip and groin pain. Does not complain of any open wounds or head trauma. No loss of consciousness. No other musculoskeletal complaints. The patient denies any other history of fevers, chills, nausea, vomiting, no other numbness, weakness, or tingling. PMH: 
Past Medical History:  
Diagnosis Date  Chronic kidney disease  CKD (chronic kidney disease) stage 3, GFR 30-59 ml/min ([facility]) 5/17/2010  Hypercholesteremia  Hypertension  Post herpetic neuralgia  Stroke ([facility] [address]  Thyroid disease PSxHx: 
Past Surgical History:  
Procedure Laterality Date  HX APPENDECTOMY  HX HYSTERECTOMY  1947  
 one ovary out Meds: 
 
Current Facility-Administered Medications:  
  sodium chloride (NS) flush 5-40 mL, 5-40 mL, IntraVENous, Q8H, Jazmyne Page MD, 10 mL at 03/05/20 0719 
  sodium chloride (NS) flush 5-40 mL, 5-40 mL, IntraVENous, PRN, Jazmyne Page MD 
  acetaminophen (TYLENOL) tablet 650 mg, 650 mg, Oral, Q6H PRN, Juanita Becerra MD, 650 mg at 03/05/20 0718 
  oxyCODONE-acetaminophen (PERCOCET) 5-325 mg per tablet 1 Tab, 1 Tab, Oral, Q6H PRN, Jazmyne Page MD 
  [facility]) injection 0.4 mg, 0.4 mg, IntraVENous, PRN, Jazmyne Page MD 
  ondansetron [facility]) injection 4 mg, 4 mg, IntraVENous, Q4H PRN, Jazmyne Page MD 
  bisacodyL (DULCOLAX) suppository 10 mg, 10 mg, Rectal, DAILY PRN, Jazmyne Page MD 
  aspirin delayed-release tablet 81 mg, 81 mg, Oral, DAILY, Jazmyne Page MD 
  hydroCHLOROthiazide (MICROZIDE) capsule 12.5 mg, 12.5 mg, Oral, DAILY, Jazmyne Page MD 
  polyethylene glycol (MIRALAX) packet 17 g, 17 g, Oral, DAILY, Jazmyne Page MD 
   atorvastatin (LIPITOR) tablet 10 mg, 10 mg, Oral, QHS, Zulema Page MD 
  levothyroxine (SYNTHROID) tablet 50 mcg, 50 mcg, Oral, 6am, Zulema Page MD, 50 mcg at 03/05/20 5007   hydrALAZINE (APRESOLINE) 20 mg/mL injection 10 mg, 10 mg, IntraVENous, Q6H PRN, Dinesh Burton MD, 10 mg at 03/05/20 0128   amLODIPine (NORVASC) tablet 2.5 mg, 2.5 mg, Oral, DAILY, Zulema Page MD, Stopped at 03/05/20 2246   ceFAZolin (ANCEF) 2 g in sterile water (preservative free) 20 mL IV syringe, 2 g, IntraVENous, ON CALL TO OR, Yayo Cervantes PA-C 
  0.9% sodium chloride infusion, 75 mL/hr, IntraVENous, CONTINUOUS, Zuleam Page MD, Last Rate: 75 mL/hr at 03/05/20 0131, 75 mL/hr at 03/05/20 0131 All: Allergies Allergen Reactions  Lyrica [Pregabalin] Other (comments) Couldn't function  Macrobid [Nitrofurantoin Monohyd/M-Cryst] Other (comments)  
  sick  Neurontin [Gabapentin] Other (comments) groggy Social Hx: 
Social History Socioeconomic History  Marital status:  Spouse name: Not on file  Number of children: Not on file  Years of education: Not on file  Highest education level: Not on file Tobacco Use  Smoking status: Never Smoker  Smokeless tobacco: Never Used Substance and Sexual Activity  Alcohol use: No  
 Drug use: No  
 
 
Family Hx: 
Family History Problem Relation Age of Onset  Cancer Father Review of Systems:  
 
 
General: Denies headache, lethargy, fever, weight loss Ears/Nose/Throat: Denies ear discharge, drainage, nosebleeds, hoarse voice, dental problems Cardiovascular: Denies chest pain, shortness of breath Lungs: Denies chest pain, breathing problems, wheezing, pneumonia Stomach: Denies stomach pain, heartburn, constipation, irritable bowel Skin: Denies rash, sores, open wounds Musculoskeletal: right hip and groin pain Genitourinary: Denies dysuria, hematuria, polyuria Gastrointestinal: Denies constipation, obstipation, diarrhea Neurological: Denies changes in sight, smell, hearing, taste, seizures. Denies loss of consciousness. Psychiatric: Denies depression, sleep pattern changes, anxiety, change in personality Endocrine: Denies mood swings, heat or cold intolerance Hematologic/Lymphatic: Denies anemia, purpura, petechia Allergic/Immunologic: Denies swelling of throat, pain or swelling at lymph nodes Physical Examination: 
 
Visit Vitals /68 (BP 1 Location: Right arm, BP Patient Position: At rest) Pulse (!) 104 Temp 97.9 °F (36.6 °C) Resp 18 Ht 5' 7\" (1.702 m) Wt 120 lb (54.4 kg) SpO2 97% BMI 18.79 kg/m² General: AOX3, no apparent distress Psychiatric: mood and affect appropriate Lungs: breathing is symmetric and unlabored bilaterally Heart: regular rate and rhythm Abdomen: no guarding Head: normocephalic, atraumatic Skin: No significant abnormalities, good turgor Sensation intact to light touch: C5-T1 dermatomes Muscular exam: 5/5 strength in all major muscle groups unless noted in specialty exam. 
 
Extremities Right upper extremity: Full active and passive range of motion without pain, deformity, no open wound, strength 5/5 in all major muscle groups. Left upper extremity:  Full active and passive range of motion without pain, deformity, no open wound, strength 5/5 in all major muscle groups. Left lower extremity: Full active and passive range of motion without pain, deformity, no open wound, strength 5/5 in all major muscle groups. Right lower extremity:  Focused exam of the right hip demonstrates pain with circumduction of the hip which refers to the groin. This does reproduce the symptoms of the chief complaint. Leg is shortened and externally rotated. Sensation is intact to light touch in the L1-S1 distributions bilaterally.   Strength is 5 out of 5 strength at tibialis anterior, EHL and, FHL. Capillary refill is less than 2 seconds in the toes. Head is normocephalic and atraumatic, heart has a regular rate and rhythm, and breathing is symmetric and unlabored bilaterally. Imaging: 
X-rays are available of the pelvis and right hip which demonstrate a displaced intertrochanteric femoral fracture. Diffuse osteopenia is noted. No other osseous abnormalities are noted. Assessment: 
 
Right intertrochanteric femoral fracture, displaced. Plan: 
 
We did discuss the treatment options for this right hip fracture, we did discuss that for early mobilization purposes, general hygiene, comfort, early ambulation, that surgical intervention in the form of fracture stabilization is indicated. We did discuss the risks of surgery which include but are not limited to infection, nerve or blood vessel damage, femoral, sciatic, and lateral femoral cutaneous nerve injuries, need for reoperation, postoperative pain and swelling, intra-or postoperative fracture, postoperative dislocation, leg length inequality, need for reoperation, implant failure, death, disability, organ dysfunction, wound healing issues, DVT, PE, and the need for further procedures. The patient did freely state their understanding and satisfaction with our discussion. We will proceed after medical clearances. Plan for ORIF right hip fracture.

## 2020-03-05 NOTE — ED NOTES
Bedside and Verbal shift change report given to [address] (oncoming nurse) by Genna Garcia RN (offgoing nurse). Report included the following information SBAR, ED Summary, MAR and Recent Results.

## 2020-03-06 LAB
ALBUMIN SERPL-MCNC: 2.4 G/DL (ref 3.5–5)
ALBUMIN/GLOB SERPL: 0.8 {RATIO} (ref 1.1–2.2)
ALP SERPL-CCNC: 70 U/L (ref 45–117)
ALT SERPL-CCNC: 15 U/L (ref 12–78)
ANION GAP SERPL CALC-SCNC: 3 MMOL/L (ref 5–15)
AST SERPL-CCNC: 21 U/L (ref 15–37)
BASOPHILS # BLD: 0 K/UL (ref 0–0.1)
BASOPHILS NFR BLD: 0 % (ref 0–1)
BILIRUB SERPL-MCNC: 0.9 MG/DL (ref 0.2–1)
BUN SERPL-MCNC: 32 MG/DL (ref 6–20)
BUN/CREAT SERPL: 20 (ref 12–20)
CALCIUM SERPL-MCNC: 8.1 MG/DL (ref 8.5–10.1)
CHLORIDE SERPL-SCNC: 105 MMOL/L (ref 97–108)
CO2 SERPL-SCNC: 28 MMOL/L (ref 21–32)
CREAT SERPL-MCNC: 1.63 MG/DL (ref 0.55–1.02)
DIFFERENTIAL METHOD BLD: ABNORMAL
EOSINOPHIL # BLD: 0 K/UL (ref 0–0.4)
EOSINOPHIL NFR BLD: 1 % (ref 0–7)
ERYTHROCYTE [DISTWIDTH] IN BLOOD BY AUTOMATED COUNT: 13.7 % (ref 11.5–14.5)
GLOBULIN SER CALC-MCNC: 3 G/DL (ref 2–4)
GLUCOSE SERPL-MCNC: 100 MG/DL (ref 65–100)
HCT VFR BLD AUTO: 25.7 % (ref 35–47)
HGB BLD-MCNC: 8.1 G/DL (ref 11.5–16)
IMM GRANULOCYTES # BLD AUTO: 0 K/UL (ref 0–0.04)
IMM GRANULOCYTES NFR BLD AUTO: 0 % (ref 0–0.5)
LYMPHOCYTES # BLD: 1 K/UL (ref 0.8–3.5)
LYMPHOCYTES NFR BLD: 15 % (ref 12–49)
MCH RBC QN AUTO: 30.2 PG (ref 26–34)
MCHC RBC AUTO-ENTMCNC: 31.5 G/DL (ref 30–36.5)
MCV RBC AUTO: 95.9 FL (ref 80–99)
MONOCYTES # BLD: 0.8 K/UL (ref 0–1)
MONOCYTES NFR BLD: 12 % (ref 5–13)
NEUTS SEG # BLD: 5.2 K/UL (ref 1.8–8)
NEUTS SEG NFR BLD: 73 % (ref 32–75)
NRBC # BLD: 0 K/UL (ref 0–0.01)
NRBC BLD-RTO: 0 PER 100 WBC
PLATELET # BLD AUTO: 197 K/UL (ref 150–400)
PMV BLD AUTO: 10.7 FL (ref 8.9–12.9)
POTASSIUM SERPL-SCNC: 3.9 MMOL/L (ref 3.5–5.1)
PROT SERPL-MCNC: 5.4 G/DL (ref 6.4–8.2)
RBC # BLD AUTO: 2.68 M/UL (ref 3.8–5.2)
SODIUM SERPL-SCNC: 136 MMOL/L (ref 136–145)
WBC # BLD AUTO: 7.1 K/UL (ref 3.6–11)

## 2020-03-06 PROCEDURE — 77010033678 HC OXYGEN DAILY

## 2020-03-06 PROCEDURE — 97530 THERAPEUTIC ACTIVITIES: CPT | Performed by: OCCUPATIONAL THERAPIST

## 2020-03-06 PROCEDURE — 97165 OT EVAL LOW COMPLEX 30 MIN: CPT | Performed by: OCCUPATIONAL THERAPIST

## 2020-03-06 PROCEDURE — 65270000029 HC RM PRIVATE

## 2020-03-06 PROCEDURE — 85025 COMPLETE CBC W/AUTO DIFF WBC: CPT

## 2020-03-06 PROCEDURE — 74011250636 HC RX REV CODE- 250/636: Performed by: ORTHOPAEDIC SURGERY

## 2020-03-06 PROCEDURE — 74011250637 HC RX REV CODE- 250/637: Performed by: ORTHOPAEDIC SURGERY

## 2020-03-06 PROCEDURE — 74011250637 HC RX REV CODE- 250/637: Performed by: INTERNAL MEDICINE

## 2020-03-06 PROCEDURE — 94760 N-INVAS EAR/PLS OXIMETRY 1: CPT

## 2020-03-06 PROCEDURE — 36415 COLL VENOUS BLD VENIPUNCTURE: CPT

## 2020-03-06 PROCEDURE — 74011000250 HC RX REV CODE- 250: Performed by: ORTHOPAEDIC SURGERY

## 2020-03-06 PROCEDURE — 80053 COMPREHEN METABOLIC PANEL: CPT

## 2020-03-06 PROCEDURE — 97162 PT EVAL MOD COMPLEX 30 MIN: CPT

## 2020-03-06 PROCEDURE — 97530 THERAPEUTIC ACTIVITIES: CPT

## 2020-03-06 RX ORDER — LEVOTHYROXINE SODIUM 50 UG/1
50 TABLET ORAL
Status: DISCONTINUED | OUTPATIENT
Start: 2020-03-09 | End: 2020-03-09 | Stop reason: HOSPADM

## 2020-03-06 RX ORDER — LEVOTHYROXINE SODIUM 100 UG/1
100 TABLET ORAL
Status: DISCONTINUED | OUTPATIENT
Start: 2020-03-07 | End: 2020-03-09 | Stop reason: HOSPADM

## 2020-03-06 RX ADMIN — Medication 10 ML: at 21:16

## 2020-03-06 RX ADMIN — ACETAMINOPHEN 650 MG: 325 TABLET ORAL at 23:20

## 2020-03-06 RX ADMIN — Medication 10 ML: at 06:19

## 2020-03-06 RX ADMIN — Medication 1 TABLET: at 08:50

## 2020-03-06 RX ADMIN — WATER 2 G: 1 INJECTION INTRAMUSCULAR; INTRAVENOUS; SUBCUTANEOUS at 03:11

## 2020-03-06 RX ADMIN — SENNOSIDES AND DOCUSATE SODIUM 1 TABLET: 8.6; 5 TABLET ORAL at 15:48

## 2020-03-06 RX ADMIN — HEPARIN SODIUM 5000 UNITS: 5000 INJECTION INTRAVENOUS; SUBCUTANEOUS at 21:16

## 2020-03-06 RX ADMIN — ACETAMINOPHEN 650 MG: 325 TABLET ORAL at 15:47

## 2020-03-06 RX ADMIN — ATORVASTATIN CALCIUM 10 MG: 10 TABLET, FILM COATED ORAL at 21:16

## 2020-03-06 RX ADMIN — HEPARIN SODIUM 5000 UNITS: 5000 INJECTION INTRAVENOUS; SUBCUTANEOUS at 08:50

## 2020-03-06 RX ADMIN — Medication 1 TABLET: at 15:48

## 2020-03-06 RX ADMIN — ACETAMINOPHEN 650 MG: 325 TABLET ORAL at 06:17

## 2020-03-06 RX ADMIN — ACETAMINOPHEN 650 MG: 325 TABLET ORAL at 12:20

## 2020-03-06 RX ADMIN — Medication 10 ML: at 14:00

## 2020-03-06 RX ADMIN — POLYETHYLENE GLYCOL (3350) 17 G: 17 POWDER, FOR SOLUTION ORAL at 08:50

## 2020-03-06 RX ADMIN — SENNOSIDES AND DOCUSATE SODIUM 1 TABLET: 8.6; 5 TABLET ORAL at 08:50

## 2020-03-06 RX ADMIN — Medication 1 TABLET: at 12:20

## 2020-03-06 RX ADMIN — ASPIRIN 81 MG: 81 TABLET ORAL at 08:50

## 2020-03-06 RX ADMIN — LEVOTHYROXINE SODIUM 50 MCG: 50 TABLET ORAL at 06:16

## 2020-03-06 NOTE — PROGRESS NOTES
Bedside and Verbal shift change report given to [address] [place] [address] (oncoming nurse) by Kayli Ashley RN (offgoing nurse). Report included the following information SBAR, Kardex, OR Summary, Procedure Summary, Intake/Output, MAR and Recent Results.

## 2020-03-06 NOTE — PROGRESS NOTES
Hospitalist Progress Note NAME: Elba Patterson :  1912 MRN:  [id number] Assessment / Plan: 
Acute comminuted intertrochanteric fracture right femur s/p  IM Nailing 
mechanical fall POA 
-s/p Right short cephalomedullary nail  
-Pain control with Tylenol and oxycodone 
-On heparin for DVT prophylaxis. Please note aspirin is her home medication. Will consider Eliquis at the time of discharge. -PT notes reviewed and recommends SNF placement 
-Case management is in touch with family to arrange SNF 
  
Essential HTN POA 
-BP is borderline but gradually going up 
-Consider resuming hydrochlorothiazide soon if BP goes up 
-On PRN hydralazine Stage 3 chronic kidney disease POA 
-Creatinine is close to baseline of around 1.5. Hypothyroidism POA 
-Continue po levothyroxine History of shingles and post-herpetic neuralgia POA 
-Hold nortryptiline for now Dyslipidemia 
-Continue Lipitor Hard of hearing 
  
Body mass index is 18.79 kg/m². Code status: DNR Prophylaxis: by ortho Recommended Disposition: TBD Subjective: Chief Complaint / Reason for Physician Visit 
poor historian due to hard of hearing She still reports pain in her right hip No evidence of active bleeding Reports dizziness Objective: VITALS:  
Last 24hrs VS reviewed since prior progress note. Most recent are: 
Patient Vitals for the past 24 hrs: 
 Temp Pulse Resp BP SpO2  
20 1234 98.1 °F (36.7 °C) (!) 102 18 159/69 93 % 20 0919  94  (!) 87/41 94 % 20 0904  (!) 104  102/58 92 % 20 0902  (!) 101  106/52 92 % 20 0857  (!) 102  126/58 95 % 20 0801 98.5 °F (36.9 °C) 95 16 135/47 99 % 20 0400 98.6 °F (37 °C) 94 16 138/55 99 % 20 0000 98.5 °F (36.9 °C) 92 16 150/58 99 % 20 98.5 °F (36.9 °C) 90  123/59 100 % 20 1914 98.3 °F (36.8 °C) 88  125/53 99 % 20 1812 97.7 °F (36.5 °C) 92  122/58 99 % 03/05/20 1710 97.9 °F (36.6 °C) 87  131/59 100 % 03/05/20 1610 97.5 °F (36.4 °C) 90  143/65 100 % 03/05/20 1510 97.3 °F (36.3 °C) 76 16 (!) 88/48 100 % 03/05/20 1445  73 17  95 % 03/05/20 1444 97.3 °F (36.3 °C)      
03/05/20 1430  85 25 135/49 99 % Intake/Output Summary (Last 24 hours) at 3/6/2020 1423 Last data filed at 3/6/2020 0500 Gross per 24 hour Intake 1141.67 ml Output 450 ml Net 691.67 ml PHYSICAL EXAM: 
General: cooperative, no acute distress   
EENT:  EOMI. Anicteric sclerae. MMM Resp:  CTA bilaterally, no wheezing or rales. No accessory muscle use CV:  Regular  rhythm,  No edema GI:  Soft, Non distended, Non tender.  +Bowel sounds Neurologic:  Alert and awake, normal speech, Psych:   Good insight. Not anxious nor agitated Skin:  Right hip incision is CDI Reviewed most current lab test results and cultures  YES Reviewed most current radiology test results   YES Review and summation of old records today    NO Reviewed patient's current orders and MAR    YES 
PMH/ reviewed - no change compared to H&P 
________________________________________________________________________ Care Plan discussed with: 
  Comments Patient x Family RN x Care Manager Consultant Multidiciplinary team rounds were held today with , nursing, pharmacist and clinical coordinator. Patient's plan of care was discussed; medications were reviewed and discharge planning was addressed. ________________________________________________________________________ Total NON critical care TIME:  35   Minutes Total CRITICAL CARE TIME Spent:   Minutes non procedure based Comments >50% of visit spent in counseling and coordination of care    
________________________________________________________________________ Chapis Tsang MD  
 
Procedures: see electronic medical records for all procedures/Xrays and details which were not copied into this note but were reviewed prior to creation of Plan. LABS: 
I reviewed today's most current labs and imaging studies. Pertinent labs include: 
Recent Labs 03/06/20 
7075 03/04/20 
2204 WBC 7.1 8.0 HGB 8.1* 11.9 HCT 25.7* 37.6  265 Recent Labs 03/06/20 
9341 03/05/20 
4758 03/04/20 
2204  136 137  
K 3.9 4.0 4.1  103 102 CO2 28 28 32  130* 112* BUN 32* 35* 36* CREA 1.63* 1.57* 1.70* CA 8.1* 9.0 9.2 ALB 2.4*  --  3.5 TBILI 0.9  --  0.3 SGOT 21  --  26 ALT 15  --  25 INR  --   --  1.0 Signed: Apollo Chu MD

## 2020-03-06 NOTE — PROGRESS NOTES
Initial Nutrition Assessment: 
 
INTERVENTIONS/RECOMMENDATIONS:  
· Continue regular diet · Ensure TID (vanilla or strawberry) · Please document % meals and supplements consumed in flowsheet I/O's under intake ASSESSMENT:  
Chart reviewed, medically noted for Right intertrochanteric hip fracture s/p Right short cephalomedullary nail and PMH shown below. We discussed the role that nutrition plays in healing after a fracture/surgery, specifically focusing on protein sources at each meal as well as nutrition supplements to help meet pt protein needs. She agreed to receive ensure. Her usual appetite is good. Weight history shows no significant weight loss over the past year. Past Medical History:  
Diagnosis Date  Chronic kidney disease  CKD (chronic kidney disease) stage 3, GFR 30-59 ml/min ([facility] 5/17/2010  Hypercholesteremia  Hypertension  Post herpetic neuralgia  Stroke ([facility] [address]  Thyroid disease Diet Order: Regular 
% Eaten:   
Patient Vitals for the past 72 hrs: 
 % Diet Eaten 03/05/20 2229 100 % 03/05/20 1000 0 % Pertinent Medications: [x]Reviewed: os-du, miralax, pericolace, Pertinent Labs: [x]Reviewed:  
Food Allergies: [x]NKFA  []Other Last BM: 3/4 Edema:  n/a      []RUE   []LUE   []RLE   []LLE Pressure Injury:   n/a   [] Stage I   [] Stage II   [] Stage III   [] Stage IV Wt Readings from Last 30 Encounters:  
03/04/20 54.4 kg (120 lb)  
02/27/20 54.9 kg (121 lb) 02/18/20 54.9 kg (121 lb) 02/06/20 55.6 kg (122 lb 9.6 oz) 02/04/20 56.7 kg (125 lb) 11/06/19 56.7 kg (125 lb) 10/24/19 55.8 kg (123 lb) 10/17/19 55.8 kg (123 lb)  
06/17/19 54.4 kg (120 lb)  
02/28/19 57.1 kg (125 lb 12.8 oz)  
11/13/18 56.2 kg (124 lb)  
07/13/18 57 kg (125 lb 9.6 oz) 02/02/18 58.3 kg (128 lb 8 oz)  
11/20/17 62.6 kg (138 lb)  
09/18/17 59.9 kg (132 lb) 08/04/17 60.3 kg (133 lb) 05/25/17 61.7 kg (136 lb)  
01/17/17 61.2 kg (135 lb) 12/19/16 62.4 kg (137 lb 8 oz) 12/15/16 62.4 kg (137 lb 8 oz) 10/28/16 65.8 kg (145 lb) 08/30/16 64.4 kg (142 lb) 04/12/16 65.3 kg (144 lb)  
03/28/16 65.8 kg (145 lb)  
01/21/16 63.5 kg (140 lb) 12/11/15 63.5 kg (140 lb)  
11/27/15 62.5 kg (137 lb 12.6 oz) 06/18/15 62.1 kg (137 lb) 04/24/15 63 kg (139 lb) 12/17/14 63 kg (139 lb) Anthropometrics:  
Height: 5' 7\" (170.2 cm) Weight: 54.4 kg (120 lb) IBW (%IBW):   ( ) UBW (%UBW):   (  %) Last Weight Metrics: 
Weight Loss Metrics 3/4/2020 2/27/2020 2/18/2020 2/6/2020 2/4/2020 11/6/2019 10/24/2019 Today's Wt 120 lb 121 lb 121 lb 122 lb 9.6 oz 125 lb 125 lb 123 lb BMI 18.79 kg/m2 18.95 kg/m2 18.95 kg/m2 19.2 kg/m2 19.58 kg/m2 19.58 kg/m2 19.26 kg/m2 BMI: Body mass index is 18.79 kg/m². This BMI is indicative of: 
 []Underweight    [x]Normal    []Overweight    [] Obesity   [] Extreme Obesity (BMI>40) Estimated Nutrition Needs (Based on):  
1200 Kcals/day(BMR: 900 x 1.3) , 65 g(1.2 g/kg) Protein Carbohydrate: At Least 130 g/day  Fluids: 1200 mL/day (1ml/kcal) or per primary team 
 
NUTRITION DIAGNOSES:  
Problem:  Increased nutrient needs Etiology: related to fracture and post-op healing Signs/Symptoms: as evidenced by Right intertrochanteric hip fracture s/p Right short cephalomedullary nail NUTRITION INTERVENTIONS: 
Meals/Snacks: General/healthful diet   Supplements: Commercial supplement GOAL:  
consume >50% of meals and ONS in 3-5 days LEARNING NEEDS (Diet, Food/Nutrient-Drug Interaction):  
 [x] None Identified 
 [] Identified and Education Provided/Documented 
 [] Identified and Pt declined/was not appropriate Cultureal, Synagogue, OR Ethnic Dietary Needs:  
 [x] None Identified 
 [] Identified and Addressed 
 
 [x] Interdisciplinary Care Plan Reviewed/Documented  
 [x] Discharge Planning:  General healthy diet with adequate kcal and protein intake to promote fracture and post op healing. ONS as need to meet kcal and protein needs. MONITORING /EVALUATION:  
  
Food/Nutrient Intake Outcomes: Total energy intake Physical Signs/Symptoms Outcomes: Weight/weight change, Electrolyte and renal profile, Glucose profile, GI 
 
NUTRITION RISK:  
 [] High              [x] Moderate           []  Low  []  Minimal/Uncompromised PT SEEN FOR:  
 [x]  MD Consult: []Calorie Count []Diabetic Diet Education []Diet Education []Electrolyte Management 
   [x]General Nutrition Management and Supplements []Management of Tube Feeding []TPN Recommendations []  RN Referral:  []MST score >=2 
   []Enteral/Parenteral Nutrition PTA []Pregnant: Gestational DM or Multigestation 
   []Pressure Ulcer/Wound Care needs 
     
[]  Low BMI 
[]  LOS Referral  
 
 
Kailey Brooke RDN Pager [phone] Weekend Pager [phone]

## 2020-03-06 NOTE — PROGRESS NOTES
S: No complaints, no acute events. O:  
Visit Vitals /47 (BP 1 Location: Right arm, BP Patient Position: At rest) Pulse 95 Temp 98.5 °F (36.9 °C) Resp 16 Ht 5' 7\" (1.702 m) Wt 120 lb (54.4 kg) SpO2 99% BMI 18.79 kg/m² Dressing C/D/I Sensation intact L1-S1 
TA/GCS/EHL: 5/5 Capillary refill less than 2 seconds. A: POD1 IMN right hip P: 
PT,TTWB Pain control DVT prophylaxis D/C planning

## 2020-03-06 NOTE — PROGRESS NOTES
Ortho / Neurosurgery NP Note POD# 1  s/p RIGHT FEMORAL INTERTROCHANTERIC NAIL INSERTION Pt resting in chair, assisted by PT - tolerated well Pain well controlled. Denies cp, sob, dizziness. Tolerating food. VSS Afebrile. Room air - will have nursing d/c forehead pulse ox to prevent pressure injury /47, HR 95, RR 16 O2 99% Voiding status: chandler d/c this morning- due to void Output (mL) Last Bowel Movement Date: 03/04/20 (03/06/20 0000) Labs Lab Results Component Value Date/Time HGB 8.1 (L) 03/06/2020 03:13 AM  
  
Lab Results Component Value Date/Time INR 1.0 03/04/2020 10:04 PM  
  
 
Recent Glucose Results:  
Lab Results Component Value Date/Time  03/06/2020 03:13 AM  
 
Cr 1.63 - pt's baseline Body mass index is 18.79 kg/m². : A BMI > 30 is classified as obesity and > 40 is classified as morbid obesity. AOx3 4x4 and tegaderm dressing c.d.i 
S1, S2 - no extra heart sounds Lungs clear bilaterally Cryotherapy in place over incision Calves soft and supple; No pain with passive stretch Sensation and motor intact - PF/DF/EHL intact 5/5 SCDs for mechanical DVT proph while in bed PLAN: 
1) PT BID, OT - TTWB 
2) Aspirin 81 mg Daily, Heparin 5000 units q 12h for DVT Prophylaxis - managed by IM 3) Pain control - limit narcotics given age, scheduled tylenol, utilize ice packs 4) Nutrition - wears upper dentures only, some difficulty chewing but tolerates regular diet. Ensure supplements. 4) Readniess for discharge: 
   [x] Vital Signs stable  
 [x] Hgb stable  
 [] + Voiding - due to void 
 [x] Wound intact, drainage minimal  
 [x] Tolerating PO intake   
 [] Cleared by PT (OT if applicable) [] Stair training completed (if applicable) [] Independent / Contact Guard Assist (household distance) [] Bed mobility [] Car transfers  
  [] ADLs [x] Adequate pain control on oral medication alone Discharge pending PT progress and voiding status. Lives alone with strong family support. May need SNF. Josue Herman NP

## 2020-03-06 NOTE — PROGRESS NOTES
Problem: Self Care Deficits Care Plan (Adult) Goal: *Acute Goals and Plan of Care (Insert Text) Description FUNCTIONAL STATUS PRIOR TO ADMISSION: pt lives alone-she reports that her family lives close by. Pt reports independence in adls. She grocery shops (a family member drives) and she uses the motorized (?) cart. Pt reports that she cooks and does housekeeping. She reports that she takes rest breaks while performing adls/IADLs at home. 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 Family is close by Occupational Therapy Goals Initiated 3/6/2020 1. Patient will walk into the bathroom to perform grooming with minimal assistance within 7 day(s). 2.  Patient will perform bathing with moderate assistance  within 7 day(s). 3.  Patient will perform upper body dressing and lower body dressing with moderate assistance, using adaptive aids, prn,  within 7 day(s). 4.  Patient will perform toilet transfers with moderate assistance  within 7 day(s). 5.  Patient will perform all aspects of toileting with moderate assistance  within 7 day(s). 6.  Patient will participate in upper extremity therapeutic exercise/activities with supervision/set-up for 5 minutes within 7 day(s). Outcome: Not Met OCCUPATIONAL THERAPY EVALUATION Patient: Torito Kaiser (89 y.o. female) Date: 3/6/2020 Primary Diagnosis: Closed right hip fracture ([facility] [address] [S72.001A] Procedure(s) (LRB): 
RIGHT FEMORAL INTERTROCHANTERIC NAIL INSERTION (Right) 1 Day Post-Op Precautions:   TTWB, Fall ASSESSMENT Based on the objective data described below, the patient presents with advanced age, [place], impaired vision, pain in R hip (tho' minimal complaints this session) s/p IM nail R hip post fall at home. Pt is oriented and very cooperative and appropriate. Pt performed bed mobility and was seated EOB prior to transfer, VS generally stable.   Pt demonstrates good use of BUEs for maneuvering the RW and adhering to TTWB precautions requiring assist X2 for functional transfer. Pt is functioning well below her independent baseline and will benefit from acute OT services and then rehab at discharge. . 
 
Current Level of Function Impacting Discharge (ADLs/self-care): min. To maximal  assistance Functional Outcome Measure: The patient scored Total: 40/100 on the Barthel Index outcome measure which is indicative of 60% impaired ability to care for basic self needs/dependency on others; inferred  dependency on others for instrumental ADLs. Other factors to consider for discharge: advanced age, lives alone Patient will benefit from skilled therapy intervention to address the above noted impairments. PLAN : 
Recommendations and Planned Interventions: self care training, functional mobility training, therapeutic exercise, balance training, visual/perceptual training, therapeutic activities,  endurance activities, neuromuscular re-education, patient education, home safety training, and family training/education Frequency/Duration: Patient will be followed by occupational therapy 5 times a week to address goals. Recommendation for discharge: (in order for the patient to meet his/her long term goals) Rehab This discharge recommendation: 
Has not yet been discussed the attending provider and/or case management IF patient discharges home will need the following DME: tbd SUBJECTIVE:  
Patient stated My family lives around me.  OBJECTIVE DATA SUMMARY:  
HISTORY:  
Past Medical History:  
Diagnosis Date  Chronic kidney disease  CKD (chronic kidney disease) stage 3, GFR 30-59 ml/min ([facility]) 5/17/2010  Hypercholesteremia  Hypertension  Post herpetic neuralgia  Stroke ([facility] [address]  Thyroid disease Past Surgical History:  
Procedure Laterality Date  HX APPENDECTOMY  HX HYSTERECTOMY  1947  
 one ovary out Expanded or extensive additional review of patient history:   
 
Home Situation Home Environment: Private residence One/Two Story Residence: Other (Comment) Living Alone: Yes Support Systems: Family member(s), Child(nan) Patient Expects to be Discharged to[de-identified] Private residence Current DME Used/Available at Home: Other (comment) Tub or Shower Type: Shower Hand dominance: Right EXAMINATION OF PERFORMANCE DEFICITS: 
Cognitive/Behavioral Status: 
Neurologic State: Alert;Eyes open spontaneously Orientation Level: Oriented X4 Cognition: Follows commands Perception: Appears intact Perseveration: No perseveration noted Safety/Judgement: Awareness of environment; Fall prevention; Insight into deficits Skin: intact-incisions dry. General fragile skin Edema: surgical site, ice packs Hearing: Auditory Auditory Impairment: Hard of hearing, bilateral 
 
Vision/Perceptual:   
    
    
    
  
Diplopia: No   
Acuity: Impaired near vision; Impaired far vision Corrective Lenses: (none present) Range of Motion: BUEs:  
AROM: Generally decreased, functional 
PROM: Generally decreased, functional 
  
  
  
  
  
  
 
Strength: BUEs:   
Strength: Generally decreased, functional 
  
  
  
  
 
Coordination: 
Coordination: Within functional limits Fine Motor Skills-Upper: Left Intact; Right Intact Gross Motor Skills-Upper: Left Intact; Right Intact Tone & Sensation: 
 
Tone: Normal 
Sensation: Intact Balance: 
Sitting: Intact Standing: Impaired Standing - Static: Fair;Constant support Standing - Dynamic : Poor;Constant support Functional Mobility and Transfers for ADLs: 
Bed Mobility: 
Rolling: Moderate assistance Supine to Sit: Moderate assistance Sit to Supine: Moderate assistance Scooting: Moderate assistance Transfers: 
Sit to Stand: Moderate assistance;Assist x2 Stand to Sit: Moderate assistance;Assist x2 
 Bed to Chair: Moderate assistance; Additional time;Assist x2 Assistive Device : Luellen Canavan ADL Assessment: 
Feeding: Setup Oral Facial Hygiene/Grooming: Stand-by assistance;Setup Bathing: Maximum assistance Upper Body Dressing: Moderate assistance Lower Body Dressing: Maximum assistance Toileting: Maximum assistance ADL Intervention and task modifications: 
 Pt educated on role of OT. Pt able to perform bed mobility and adl mobility to initiate transfer training post surgery R hip--TTWB precautions. Cognitive Retraining Safety/Judgement: Awareness of environment; Fall prevention; Insight into deficits Therapeutic Exercise: 
Encouraged OOB for meals Functional Measure: 
Barthel Index: 
 
Bathin Bladder: 5 Bowels: 10 
Groomin Dressin Feeding: 10 Mobility: 0 Stairs: 0 Toilet Use: 5 Transfer (Bed to Chair and Back): 5 Total: 40/100 The Barthel ADL Index: Guidelines 1. The index should be used as a record of what a patient does, not as a record of what a patient could do. 2. The main aim is to establish degree of independence from any help, physical or verbal, however minor and for whatever reason. 3. The need for supervision renders the patient not independent. 4. A patient's performance should be established using the best available evidence. Asking the patient, friends/relatives and nurses are the usual sources, but direct observation and common sense are also important. However direct testing is not needed. 5. Usually the patient's performance over the preceding 24-48 hours is important, but occasionally longer periods will be relevant. 6. Middle categories imply that the patient supplies over 50 per cent of the effort. 7. Use of aids to be independent is allowed. Myke Perez, Barthel, D.W. (1054). Functional evaluation: the Barthel Index. [facility] (14)2. Ella Breenr GERRY Montoya, Iza Liang.[facility]., [place], [address]so (1999). Measuring the change indisability after inpatient rehabilitation; comparison of the responsiveness of the Barthel Index and Functional [place] Measure. Journal of Neurology, Neurosurgery, and Psychiatry, 66(4), 488-679. MORGAN Dubois, SHIRAZ Maldonado, & Tomasa Prasad M.A. (2004.) Assessment of post-stroke quality of life in cost-effectiveness studies: The usefulness of the Barthel Index and the EuroQoL-5D. [facility], 13, 902-91 Occupational Therapy Evaluation Charge Determination History Examination Decision-Making MEDIUM Complexity : Expanded review of history including physical, cognitive and psychosocial  history  MEDIUM Complexity : 3-5 performance deficits relating to physical, cognitive , or psychosocial skils that result in activity limitations and / or participation restrictions MEDIUM Complexity : Patient may present with comorbidities that affect occupational performnce. Miniml to moderate modification of tasks or assistance (eg, physical or verbal ) with assesment(s) is necessary to enable patient to complete evaluation Based on the above components, the patient evaluation is determined to be of the following complexity level: MEDIUM Pain Rating: 
Did not rate pain. Activity Tolerance:  
Good Please refer to the flowsheet for vital signs taken during this treatment. After treatment patient left in no apparent distress:   
Sitting in chair, Call bell within reach, and nursing notified COMMUNICATION/EDUCATION:  
The patients plan of care was discussed with: Physical therapist and Registered nurse. Patient/family have participated as able in goal setting and plan of care. This patients plan of care is appropriate for delegation to [facility]. Thank you for this referral. 
Viviana Marshall, OTR/L 
  45 minutes

## 2020-03-06 NOTE — PROGRESS NOTES
Problem: Mobility Impaired (Adult and Pediatric) Goal: *Acute Goals and Plan of Care (Insert Text) Description FUNCTIONAL STATUS PRIOR TO ADMISSION: Patient was modified independent using a rollator for functional mobility. HOME SUPPORT PRIOR TO ADMISSION: The patient lived alone with family to provide assistance. Physical Therapy Goals Initiated 3/6/2020 1. Patient will move from supine to sit and sit to supine  in bed with minimal assistance/contact guard assist within 7 day(s). 2.  Patient will transfer from bed to chair and chair to bed with minimal assistance/contact guard assist using the least restrictive device within 7 day(s). 3.  Patient will perform sit to stand with minimal assistance/contact guard assist within 7 day(s). 4.  Patient will ambulate with moderate assistance  for 5 feet with the least restrictive device within 7 day(s). 0/3/4898 [id number] by Tommy Ruth PT Outcome: Progressing Towards Goal 
PHYSICAL THERAPY TREATMENT Patient: Gaudencio Vera (89 y.o. female) Date: 3/6/2020 Diagnosis: Closed right hip fracture ([address] Sandra Mckeon <principal problem not specified> Procedure(s) (LRB): 
RIGHT FEMORAL INTERTROCHANTERIC NAIL INSERTION (Right) 1 Day Post-Op Precautions: TTWB, Fall Chart, physical therapy assessment, plan of care and goals were reviewed. ASSESSMENT Patient continues with skilled PT services and is progressing towards goals. Pt was received still sitting up in the chair since the morning session and cleared by nursing to mobilize. She needed increased assistance this session. She was able to stand with RW and pivot over to a BSC. She was able to void and needed assistance with hygiene. She stood and pivoted over to the bed and was returned to supine. BP more stable during this session and denied any dizziness. Ice applied at the end of the session. She would benefit from waffle cushion.  Pt has difficult time maintaining TTWB at times and author put foot under pt's to monitor pressure being placed through RLE. Current Level of Function Impacting Discharge (mobility/balance): mod A x 2 Other factors to consider for discharge: lives alone and TTWB PLAN : 
Patient continues to benefit from skilled intervention to address the above impairments. Continue treatment per established plan of care. to address goals. Recommendation for discharge: (in order for the patient to meet his/her long term goals) Therapy up to 5 days/week in SNF setting This discharge recommendation: 
Has been made in collaboration with the attending provider and/or case management IF patient discharges home will need the following DME: to be determined (TBD) SUBJECTIVE:  
Patient stated ew something smells bad in here.  pt was passing gas while on the [facility] OBJECTIVE DATA SUMMARY:  
Critical Behavior: 
Neurologic State: Appropriate for age Orientation Level: Disoriented to person Cognition: Follows commands Functional Mobility Training: 
Bed Mobility: 
Rolling: Moderate assistance Supine to Sit: Moderate assistance Sit to Supine: Moderate assistance Scooting: Moderate assistance Transfers: 
Sit to Stand: Moderate assistance;Assist x2 Stand to Sit: Moderate assistance;Assist x2 Stand Pivot Transfers: Moderate assistance Balance: 
Sitting: Intact Standing: Impaired Standing - Static: Fair;Constant support Standing - Dynamic : Poor;Constant support Therapeutic Exercises: Ankle pumps Pain Rating: No complaints Activity Tolerance:  
Good Please refer to the flowsheet for vital signs taken during this treatment. After treatment patient left in no apparent distress:  
Supine in bed and Call bell within reach COMMUNICATION/COLLABORATION:  
The patients plan of care was discussed with: Registered nurse. Ralph Sanders PT, DPT Time Calculation: 35 mins

## 2020-03-06 NOTE — PROGRESS NOTES
DOETTE: SNF 
1) pending SNF choice from patient and family Reason for Admission:  Right hip fracture RUR Score:  14% Plan for utilizing home health:  Patient will need rehab prior to returning home PCP: First and Last name: Dr. Lester Joyner Name of Practice: [facility] Are you a current patient: Yes/No: yes Approximate date of last visit: 2/18/2020 Current Advanced Directive/Advance Care Plan: not on file, requested to bring into hospital if has one at home Transition of Care Plan:                   
 
Patient is a 81 y/o female who was admitted to [facility] on 03/04/2020 for a right hip fracture and under went surgery to repair. CM made room visit with patient who was alert and oriented with no visitors present at bedside. Patient confirmed demographics, insurance, and emergency contact on file. Patient lives alone in a single level home with 4 MÓNICA. Patient's granddaughter, granddaughter's , great grandson, and many other family members and friends live nearby for additional support. Patient has DME including rollator, cane, and shower bars. Prior to patient falling and fracturing her hip she was completely independent with ADLS and IADLs. Patient reported \"I do all my cooking and cleaning\". Patient relies on family for transportation. Patient goes to the grocery story and does her own shopping by riding in one of the electric scooters at the store, with family supervision. Patient denied any history of HH, SNF, or IPR. Plan is for patient to d/c to SNF. Patient aware and understands this and requested CM to contact her granddaughter, Ramila Kenyon [phone] to discuss facility choice. CM left SNF list on patient's windowsill for family to review. DEE DEE contacted patient's granddaughter Ramila Kenyon and left a voice mail requesting a return phone call.  DEE DEE missed April's return phone call but received a message from Juanpablo requesting to call her cell phone [phone]. CM updated April's contact information under emergency contact with cell phone number as well. CM contacted Juanpablo and spoke to her regarding SNF. Juanpablo reported she and her family would look over list and determine SNF facility. CM requested to be contacted when that was done. Care Management Interventions PCP Verified by CM: Yes Last Visit to PCP: 02/18/20 Mode of Transport at Discharge: BLS Transition of Care Consult (CM Consult): Discharge Planning, SNF Discharge Durable Medical Equipment: No 
Physical Therapy Consult: Yes Occupational Therapy Consult: Yes Speech Therapy Consult: No 
Current Support Network: Lives Alone, Own Home, Family Lives Nearby(patient lives home alone in a single level home 4 MÓNICA family lives nearby for additional support) Confirm Follow Up Transport: Family Discharge Location Discharge Placement: Skilled nursing facility [facility], [place], [address]

## 2020-03-06 NOTE — PROGRESS NOTES
CM verified patient will have a qualifying hospital stay for [facility] starting 3/7/2020. Initial Inpatient Order was submitted on 3/4/2020. Chester Montes [facility] Care Manager - [phone] [place] [address] [employer] ACP Facilitator Zone Phone: [phone] Mobile: [phone]

## 2020-03-06 NOTE — PROGRESS NOTES
Problem: Pressure Injury - Risk of 
Goal: *Prevention of pressure injury Description Document Tevin Scale and appropriate interventions in the flowsheet. Outcome: Progressing Towards Goal 
Note: Pressure Injury Interventions: 
Sensory Interventions: Assess changes in LOC, Assess need for specialty bed, Avoid rigorous massage over bony prominences, Chair cushion, Check visual cues for pain, Discuss PT/OT consult with provider Moisture Interventions: Apply protective barrier, creams and emollients, Assess need for specialty bed, Check for incontinence Q2 hours and as needed, Minimize layers, Offer toileting Q_hr, Moisture barrier Activity Interventions: Assess need for specialty bed, Chair cushion, Increase time out of bed, Pressure redistribution bed/mattress(bed type), PT/OT evaluation Mobility Interventions: Assess need for specialty bed, Chair cushion, HOB 30 degrees or less, Pressure redistribution bed/mattress (bed type), PT/OT evaluation, Float heels Nutrition Interventions: Document food/fluid/supplement intake, Discuss nutritional consult with provider, Offer support with meals,snacks and hydration Friction and Shear Interventions: Apply protective barrier, creams and emollients, Feet elevated on foot rest, Foam dressings/transparent film/skin sealants, HOB 30 degrees or less, Lift sheet Problem: Patient Education: Go to Patient Education Activity Goal: Patient/Family Education Outcome: Progressing Towards Goal 
  
Problem: Falls - Risk of 
Goal: *Absence of Falls Description Document Santiago Valencia Fall Risk and appropriate interventions in the flowsheet.  
Outcome: Progressing Towards Goal 
Note: Fall Risk Interventions: 
Mobility Interventions: Assess mobility with egress test, Communicate number of staff needed for ambulation/transfer, OT consult for ADLs, Patient to call before getting OOB, PT Consult for assist device competence, Strengthening exercises (ROM-active/passive), PT Consult for mobility concerns, Utilize walker, cane, or other assistive device Mentation Interventions: Adequate sleep, hydration, pain control, Door open when patient unattended, Evaluate medications/consider consulting pharmacy, Increase mobility, More frequent rounding, Eyeglasses and hearing aids Medication Interventions: Assess postural VS orthostatic hypotension, Evaluate medications/consider consulting pharmacy, Patient to call before getting OOB, Teach patient to arise slowly, Utilize gait belt for transfers/ambulation Elimination Interventions: Call light in reach, Elevated toilet seat, Patient to call for help with toileting needs, Stay With Me (per policy), Toilet paper/wipes in reach, Toileting schedule/hourly rounds History of Falls Interventions: Consult care management for discharge planning, Door open when patient unattended, Evaluate medications/consider consulting pharmacy, Room close to nurse's station, Utilize gait belt for transfer/ambulation, Assess for delayed presentation/identification of injury for 48 hrs (comment for end date), Vital signs minimum Q4HRs X 24 hrs (comment for end date) Problem: Patient Education: Go to Patient Education Activity Goal: Patient/Family Education Outcome: Progressing Towards Goal 
  
Problem: Patient Education: Go to Patient Education Activity Goal: Patient/Family Education Outcome: Progressing Towards Goal 
  
Problem: Patient Education: Go to Patient Education Activity Goal: Patient/Family Education Outcome: Progressing Towards Goal 
  
Problem: Hip Fracture:Day of Admission Pre-op Goal: Off Pathway (Use only if patient is Off Pathway) Outcome: Resolved/Met Goal: Activity/Safety Outcome: Resolved/Met Goal: Consults, if ordered Outcome: Resolved/Met Goal: Diagnostic Test/Procedures Outcome: Resolved/Met Goal: Nutrition/Diet Outcome: Resolved/Met Goal: Medications Outcome: Resolved/Met Goal: Respiratory Outcome: Resolved/Met Goal: Treatments/Interventions/Procedures Outcome: Resolved/Met Goal: Psychosocial 
Outcome: Resolved/Met Goal: *Labs/Tests Within Defined Limits in Preparation for Surgery Outcome: Resolved/Met Goal: *Optimal pain control at patient's stated goal 
Outcome: Resolved/Met Goal: *Hemodynamically stable Outcome: Resolved/Met Problem: Patient Education: Go to Patient Education Activity Goal: Patient/Family Education Outcome: Progressing Towards Goal

## 2020-03-06 NOTE — PROGRESS NOTES
Spiritual Care Partner Volunteer visited patient in Ortho unit on 3/6/2020. Documented by: 
Rev. Hardin Bumpers, EdD MDiv  For [place] Page [phone])

## 2020-03-06 NOTE — PROGRESS NOTES
Bedside shift change report given to ISHAN Mcwilliams (oncoming nurse) by Jordan Chapa RN (offgoing nurse). Report included the following information SBAR, Kardex, Procedure Summary, Intake/Output, MAR, Accordion, Recent Results and Med Rec Status.

## 2020-03-06 NOTE — PROGRESS NOTES
Problem: Mobility Impaired (Adult and Pediatric) Goal: *Acute Goals and Plan of Care (Insert Text) Description FUNCTIONAL STATUS PRIOR TO ADMISSION: Patient was modified independent using a rollator for functional mobility. HOME SUPPORT PRIOR TO ADMISSION: The patient lived alone with family to provide assistance. Physical Therapy Goals Initiated 3/6/2020 1. Patient will move from supine to sit and sit to supine  in bed with minimal assistance/contact guard assist within 7 day(s). 2.  Patient will transfer from bed to chair and chair to bed with minimal assistance/contact guard assist using the least restrictive device within 7 day(s). 3.  Patient will perform sit to stand with minimal assistance/contact guard assist within 7 day(s). 4.  Patient will ambulate with moderate assistance  for 5 feet with the least restrictive device within 7 day(s). Outcome: Progressing Towards Goal 
 PHYSICAL THERAPY EVALUATION Patient: Aliza Witt (89 y.o. female) Date: 3/6/2020 Primary Diagnosis: Closed right hip fracture ([facility] [address] [S72.001A] Procedure(s) (LRB): 
RIGHT FEMORAL INTERTROCHANTERIC NAIL INSERTION (Right) 1 Day Post-Op Precautions:   TTWB, Fall ASSESSMENT Based on the objective data described below, the patient presents with generalized weakness, decreased activity tolerance, impaired balance, altered gait and overall decreased functional mobility. Pt was received in supine and cleared by nursing to mobilize. She was able to come to the EOB with minimal assistance. She did have a slight drop in BP. Stood with RW and able to slide her foot and pivot to the chair. She was left sitting up in the chair with feet elevated and ice applied. Will trial BID to see if pt can tolerate due to high PLOF. Current Level of Function Impacting Discharge (mobility/balance): mod A Functional Outcome Measure:   The patient scored 40/100 on the barthel outcome measure which is indicative of 60% impaired. Other factors to consider for discharge: lives alone Patient will benefit from skilled therapy intervention to address the above noted impairments. PLAN : 
Recommendations and Planned Interventions: bed mobility training, transfer training, gait training, therapeutic exercises, patient and family training/education and therapeutic activities Frequency/Duration: Patient will be followed by physical therapy:  twice daily to address goals. Recommendation for discharge: (in order for the patient to meet his/her long term goals) Therapy up to 5 days/week in SNF setting This discharge recommendation: 
Has been made in collaboration with the attending provider and/or case management IF patient discharges home will need the following DME: to be determined (TBD) SUBJECTIVE:  
Patient stated yall are such sweet nurses.  OBJECTIVE DATA SUMMARY:  
HISTORY:   
Past Medical History:  
Diagnosis Date  Chronic kidney disease  CKD (chronic kidney disease) stage 3, GFR 30-59 ml/min ([facility]) 5/17/2010  Hypercholesteremia  Hypertension  Post herpetic neuralgia  Stroke ([facility] [address]  Thyroid disease Past Surgical History:  
Procedure Laterality Date  HX APPENDECTOMY  HX HYSTERECTOMY  1947  
 one ovary out Personal factors and/or comorbidities impacting plan of care: lives alone Home Situation Home Environment: Private residence EXAMINATION/PRESENTATION/DECISION MAKING:  
Critical Behavior: 
Neurologic State: Appropriate for age Orientation Level: Disoriented to person Cognition: Follows commands Hearing: Auditory Auditory Impairment: Hard of hearing, bilateral 
Skin:  Dressing intact Edema: WDL Range Of Motion: 
AROM: Generally decreased, functional 
  
  
  
PROM: Generally decreased, functional 
  
  
  
Strength:   
Strength: Generally decreased, functional 
  
  
  
  
  
  
 Tone & Sensation:  
Tone: Normal 
  
  
  
  
Sensation: Intact Coordination: 
Coordination: Within functional limits Vision:  
  
Functional Mobility: 
Bed Mobility: 
Rolling: Moderate assistance Supine to Sit: Moderate assistance Scooting: Moderate assistance Transfers: 
Sit to Stand: Moderate assistance Stand to Sit: Moderate assistance Stand Pivot Transfers: Moderate assistance Balance:  
Sitting: Intact Standing: Impaired Standing - Static: Fair;Constant support Standing - Dynamic : Fair;Constant support Functional Measure: 
Barthel Index: 
 
Bathin Bladder: 5 Bowels: 10 
Groomin Dressin Feeding: 10 Mobility: 0 Stairs: 0 Toilet Use: 5 Transfer (Bed to Chair and Back): 5 Total: 40/100 The Barthel ADL Index: Guidelines 1. The index should be used as a record of what a patient does, not as a record of what a patient could do. 2. The main aim is to establish degree of independence from any help, physical or verbal, however minor and for whatever reason. 3. The need for supervision renders the patient not independent. 4. A patient's performance should be established using the best available evidence. Asking the patient, friends/relatives and nurses are the usual sources, but direct observation and common sense are also important. However direct testing is not needed. 5. Usually the patient's performance over the preceding 24-48 hours is important, but occasionally longer periods will be relevant. 6. Middle categories imply that the patient supplies over 50 per cent of the effort. 7. Use of aids to be independent is allowed. Luis Oliveira., Barthel, D.W. (0928). Functional evaluation: the Barthel Index. [address] [facility] (14)2. GERRY Gaspar, Domonique Villeda., Quinn Jo., [place], [address] [place] [address] Ave ().  Measuring the change indisability after inpatient rehabilitation; comparison of the responsiveness of the Barthel Index and Functional [place] Measure. Journal of Neurology, Neurosurgery, and Psychiatry, 66(4), 948-881. MORGAN Martin, SHIRAZ Maldonado, & Mariah Aragon M.A. (2004.) Assessment of post-stroke quality of life in cost-effectiveness studies: The usefulness of the Barthel Index and the EuroQoL-5D. [facility], 13, 093-02 Physical Therapy Evaluation Charge Determination History Examination Presentation Decision-Making MEDIUM  Complexity : 1-2 comorbidities / personal factors will impact the outcome/ POC  MEDIUM Complexity : 3 Standardized tests and measures addressing body structure, function, activity limitation and / or participation in recreation  MEDIUM Complexity : Evolving with changing characteristics  Other outcome measures barthel  MEDIUM Based on the above components, the patient evaluation is determined to be of the following complexity level: MEDIUM Pain Rating: No complaints Activity Tolerance:  
Fair Please refer to the flowsheet for vital signs taken during this treatment. After treatment patient left in no apparent distress:  
Sitting in chair and Call bell within reach COMMUNICATION/EDUCATION:  
The patients plan of care was discussed with: Occupational therapist and Registered nurse. Fall prevention education was provided and the patient/caregiver indicated understanding. and Patient/family agree to work toward stated goals and plan of care. Thank you for this referral. 
Justina Tan, PT, DPT Time Calculation: 45 mins

## 2020-03-07 LAB — HGB BLD-MCNC: 7.8 G/DL (ref 11.5–16)

## 2020-03-07 PROCEDURE — 74011250637 HC RX REV CODE- 250/637: Performed by: ORTHOPAEDIC SURGERY

## 2020-03-07 PROCEDURE — 74011250636 HC RX REV CODE- 250/636: Performed by: ORTHOPAEDIC SURGERY

## 2020-03-07 PROCEDURE — 85018 HEMOGLOBIN: CPT

## 2020-03-07 PROCEDURE — 36415 COLL VENOUS BLD VENIPUNCTURE: CPT

## 2020-03-07 PROCEDURE — 94760 N-INVAS EAR/PLS OXIMETRY 1: CPT

## 2020-03-07 PROCEDURE — 74011250637 HC RX REV CODE- 250/637: Performed by: INTERNAL MEDICINE

## 2020-03-07 PROCEDURE — 65270000029 HC RM PRIVATE

## 2020-03-07 PROCEDURE — 97530 THERAPEUTIC ACTIVITIES: CPT

## 2020-03-07 RX ADMIN — Medication 1 TABLET: at 17:34

## 2020-03-07 RX ADMIN — HEPARIN SODIUM 5000 UNITS: 5000 INJECTION INTRAVENOUS; SUBCUTANEOUS at 08:12

## 2020-03-07 RX ADMIN — Medication 1 TABLET: at 11:48

## 2020-03-07 RX ADMIN — ACETAMINOPHEN 650 MG: 325 TABLET ORAL at 06:11

## 2020-03-07 RX ADMIN — LEVOTHYROXINE SODIUM 100 MCG: 100 TABLET ORAL at 06:11

## 2020-03-07 RX ADMIN — TRAMADOL HYDROCHLORIDE 50 MG: 50 TABLET, FILM COATED ORAL at 02:14

## 2020-03-07 RX ADMIN — POLYETHYLENE GLYCOL (3350) 17 G: 17 POWDER, FOR SOLUTION ORAL at 08:12

## 2020-03-07 RX ADMIN — ACETAMINOPHEN 650 MG: 325 TABLET ORAL at 17:34

## 2020-03-07 RX ADMIN — Medication 1 TABLET: at 08:12

## 2020-03-07 RX ADMIN — ASPIRIN 81 MG: 81 TABLET ORAL at 08:11

## 2020-03-07 RX ADMIN — SENNOSIDES AND DOCUSATE SODIUM 1 TABLET: 8.6; 5 TABLET ORAL at 08:12

## 2020-03-07 RX ADMIN — SENNOSIDES AND DOCUSATE SODIUM 1 TABLET: 8.6; 5 TABLET ORAL at 17:34

## 2020-03-07 RX ADMIN — Medication 10 ML: at 21:39

## 2020-03-07 RX ADMIN — Medication 10 ML: at 06:11

## 2020-03-07 RX ADMIN — ACETAMINOPHEN 650 MG: 325 TABLET ORAL at 11:48

## 2020-03-07 RX ADMIN — Medication 10 ML: at 11:48

## 2020-03-07 RX ADMIN — HEPARIN SODIUM 5000 UNITS: 5000 INJECTION INTRAVENOUS; SUBCUTANEOUS at 21:30

## 2020-03-07 RX ADMIN — ACETAMINOPHEN 650 MG: 325 TABLET ORAL at 23:02

## 2020-03-07 RX ADMIN — TRAMADOL HYDROCHLORIDE 50 MG: 50 TABLET, FILM COATED ORAL at 08:13

## 2020-03-07 RX ADMIN — ATORVASTATIN CALCIUM 10 MG: 10 TABLET, FILM COATED ORAL at 21:38

## 2020-03-07 NOTE — PROGRESS NOTES
Orthopedic NP Progress Note Post Op day: 2 Days Post-Op March 7, 2020 10:17 AM  
 
Gino Barnett Attending Physician: Treatment Team: Attending Provider: Amber Ramos MD; Consulting Provider: Guy Heredia DO; Utilization Review: Rupinder Alvarez, ISHAN; Care Manager: Quinn Webb Vital Signs:   
Patient Vitals for the past 8 hrs: 
 BP Temp Pulse Resp SpO2  
03/07/20 0743 128/58 98.2 °F (36.8 °C) 89 18 97 % 03/07/20 0354 132/66 98.2 °F (36.8 °C) 87 16 93 % BMI (calculated): 18.8 (03/04/20 2148) Intake/Output: 
No intake/output data recorded. 03/05 1901 - 03/07 0700 In: 1600 [P.O.:600; I.V.:1000] Out: 925 [Urine:925] Pain Control:  
Pain Assessment Pain Scale 1: Numeric (0 - 10) Pain Intensity 1: 2 Pain Onset 1: postop Pain Location 1: Hip Pain Orientation 1: Right Pain Description 1: Aching Pain Intervention(s) 1: Declines LAB:   
Recent Labs 03/07/20 
9769 03/06/20 
1014 HCT  --  25.7* HGB 7.8* 8.1* Lab Results Component Value Date/Time Sodium 136 03/06/2020 03:13 AM  
 Potassium 3.9 03/06/2020 03:13 AM  
 Chloride 105 03/06/2020 03:13 AM  
 CO2 28 03/06/2020 03:13 AM  
 Glucose 100 03/06/2020 03:13 AM  
 BUN 32 (H) 03/06/2020 03:13 AM  
 Creatinine 1.63 (H) 03/06/2020 03:13 AM  
 Calcium 8.1 (L) 03/06/2020 03:13 AM  
 
 
Subjective: Gino Barnett is a 80 y.o. female s/p a  Procedure(s) with comments: 
RIGHT FEMORAL INTERTROCHANTERIC NAIL INSERTION - HANA FRACTURE TABLE; C-ARM; SYNTHES TFN Procedure(s): RIGHT FEMORAL INTERTROCHANTERIC NAIL INSERTION. Tolerating diet. Resting in bed, has been up with PT  
  
 
Objective: General: alert, cooperative, no distress. .  
Neuro/Vascular: CNS Intact. Sensation stable. Brisk cap refill, 2+ pulses UE/LE Musculoskeletal:  + ROM UE/LE Skin:warm and dry Martínez - n Drain - n 
  
 
PT/OT:  
Gait:    
            
 
 
Assessment:  
 s/p Procedure(s): RIGHT FEMORAL INTERTROCHANTERIC NAIL INSERTION 
 
 Active Problems: 
  Closed right hip fracture ([facility] [address] (3/4/2020) Plan:  
-  Continue PT/OT - TTWB  
-  Continue established methods of pain control 
-  VTE Prophylaxes - TEDS &/or SCDs with ASA and heparin  
-  GI Prophylaxis - pepcid Signed By: Ralph Seras NP Orthopedic Nurse Practitioner

## 2020-03-07 NOTE — PROGRESS NOTES
Problem: Mobility Impaired (Adult and Pediatric) Goal: *Acute Goals and Plan of Care (Insert Text) Description FUNCTIONAL STATUS PRIOR TO ADMISSION: Patient was modified independent using a rollator for functional mobility. HOME SUPPORT PRIOR TO ADMISSION: The patient lived alone with family to provide assistance. Physical Therapy Goals Initiated 3/6/2020 1. Patient will move from supine to sit and sit to supine  in bed with minimal assistance/contact guard assist within 7 day(s). 2.  Patient will transfer from bed to chair and chair to bed with minimal assistance/contact guard assist using the least restrictive device within 7 day(s). 3.  Patient will perform sit to stand with minimal assistance/contact guard assist within 7 day(s). 4.  Patient will ambulate with moderate assistance  for 5 feet with the least restrictive device within 7 day(s). Outcome: Not Progressing Towards Goal 
 PHYSICAL THERAPY TREATMENT Patient: Rossy Guthrie (89 y.o. female) Date: 3/7/2020 Diagnosis: Closed right hip fracture ([address] [facility] <principal problem not specified> Procedure(s) (LRB): 
RIGHT FEMORAL INTERTROCHANTERIC NAIL INSERTION (Right) 2 Days Post-Op Precautions: TTWB, Fall Chart, physical therapy assessment, plan of care and goals were reviewed. ASSESSMENT Patient continues with skilled PT services and is not progressing towards goals. Pt presents with decreased strength and increased pain with mobility. Pt performed bed mobility at mod A x2. Pt performed x2 sit to stand transfer at mod A x2. Pt with difficulty maintaining TTWB  when swiveling with BSC and recliner. Pt educated visually how to perform correctly. Pt reported it being very difficult after using both your legs for so long. Pts BP stable with activity. Pt   
 
Current Level of Function Impacting Discharge (mobility/balance): mobility at mod A x2 Other factors to consider for discharge: TTWB, pain PLAN : 
 Patient continues to benefit from skilled intervention to address the above impairments. Continue treatment per established plan of care. to address goals. Recommendation for discharge: (in order for the patient to meet his/her long term goals) Therapy up to 5 days/week in SNF setting This discharge recommendation: 
Has been made in collaboration with the attending provider and/or case management IF patient discharges home will need the following DME: to be determined (TBD) SUBJECTIVE:  
Patient stated  Its so hard after you use your right leg for everything.  OBJECTIVE DATA SUMMARY:  
Critical Behavior: 
Neurologic State: Alert, Eyes open spontaneously Orientation Level: Oriented X4 Cognition: Follows commands Safety/Judgement: Awareness of environment, Fall prevention, Insight into deficits Functional Mobility Training: 
Bed Mobility: 
Rolling: Moderate assistance;Assist x2 Supine to Sit: Moderate assistance;Assist x2 Scooting: Moderate assistance;Assist x2 Transfers: 
Sit to Stand: Moderate assistance;Assist x2 Stand to Sit: Moderate assistance;Assist x2 Bed to Chair: Moderate assistance;Assist x2; Additional time Balance: 
Sitting: Intact Standing: Impaired; With support Standing - Static: Fair;Constant support Standing - Dynamic : Poor;Constant support Therapeutic Exercises:  
Seated LAQ x10 Pain Rating: 
Pt reported pain with mobility. Activity Tolerance:  
Fair, Poor, and observed SOB with activity Please refer to the flowsheet for vital signs taken during this treatment. After treatment patient left in no apparent distress:  
Sitting in chair and Call bell within reach COMMUNICATION/COLLABORATION:  
The patients plan of care was discussed with: Registered nurse. Rosalba Adair PTA Time Calculation: 27 mins

## 2020-03-07 NOTE — PROGRESS NOTES
Bedside and Verbal shift change report given to ISHAN Mcwilliams  (oncoming nurse) by González Linder RN  (offgoing nurse). Report included the following information SBAR, Kardex, Procedure Summary, Intake/Output, MAR, Accordion and Recent Results.

## 2020-03-07 NOTE — PROGRESS NOTES
Problem: Pressure Injury - Risk of 
Goal: *Prevention of pressure injury Description Document Tevin Scale and appropriate interventions in the flowsheet. 3/7/2020 0949 by Shamar Overton RN Outcome: Progressing Towards Goal 
Note: Pressure Injury Interventions: 
Sensory Interventions: Assess changes in LOC, Assess need for specialty bed, Avoid rigorous massage over bony prominences, Chair cushion, Check visual cues for pain, Discuss PT/OT consult with provider Moisture Interventions: Absorbent underpads, Apply protective barrier, creams and emollients, Assess need for specialty bed, Check for incontinence Q2 hours and as needed, Contain wound drainage Activity Interventions: Assess need for specialty bed, Pressure redistribution bed/mattress(bed type), PT/OT evaluation, Increase time out of bed, Chair cushion Mobility Interventions: Assess need for specialty bed, Chair cushion, Float heels, Pressure redistribution bed/mattress (bed type), PT/OT evaluation, HOB 30 degrees or less Nutrition Interventions: Document food/fluid/supplement intake, Discuss nutritional consult with provider, Offer support with meals,snacks and hydration Friction and Shear Interventions: Apply protective barrier, creams and emollients, Feet elevated on foot rest, Foam dressings/transparent film/skin sealants, HOB 30 degrees or less, Lift sheet 3/7/2020 0948 by Shamar Overton RN Outcome: Progressing Towards Goal 
Note: Pressure Injury Interventions: 
Sensory Interventions: Assess changes in LOC, Assess need for specialty bed, Avoid rigorous massage over bony prominences, Chair cushion, Check visual cues for pain, Discuss PT/OT consult with provider Moisture Interventions: Absorbent underpads, Apply protective barrier, creams and emollients, Assess need for specialty bed, Check for incontinence Q2 hours and as needed, Contain wound drainage Activity Interventions: Assess need for specialty bed, Pressure redistribution bed/mattress(bed type), PT/OT evaluation, Increase time out of bed, Chair cushion Mobility Interventions: Assess need for specialty bed, Chair cushion, Float heels, Pressure redistribution bed/mattress (bed type), PT/OT evaluation, HOB 30 degrees or less Nutrition Interventions: Document food/fluid/supplement intake, Discuss nutritional consult with provider, Offer support with meals,snacks and hydration Friction and Shear Interventions: Apply protective barrier, creams and emollients, Feet elevated on foot rest, Foam dressings/transparent film/skin sealants, HOB 30 degrees or less, Lift sheet Problem: Patient Education: Go to Patient Education Activity Goal: Patient/Family Education 3/7/2020 0949 by Margot Cole RN Outcome: Progressing Towards Goal 
3/7/2020 0948 by Margot Cole RN Outcome: Progressing Towards Goal 
  
Problem: Falls - Risk of 
Goal: *Absence of Falls Description Document Orestes Schneider Fall Risk and appropriate interventions in the flowsheet. 3/7/2020 0949 by Margot Cole RN Outcome: Progressing Towards Goal 
Note: Fall Risk Interventions: 
Mobility Interventions: Assess mobility with egress test, Communicate number of staff needed for ambulation/transfer, OT consult for ADLs, Patient to call before getting OOB, PT Consult for mobility concerns, PT Consult for assist device competence, Strengthening exercises (ROM-active/passive), Utilize walker, cane, or other assistive device, Utilize gait belt for transfers/ambulation Mentation Interventions: Adequate sleep, hydration, pain control, Door open when patient unattended, Evaluate medications/consider consulting pharmacy, Eyeglasses and hearing aids, Reorient patient, More frequent rounding Medication Interventions: Assess postural VS orthostatic hypotension, Evaluate medications/consider consulting pharmacy, Patient to call before getting OOB, Utilize gait belt for transfers/ambulation, Teach patient to arise slowly Elimination Interventions: Call light in reach, Elevated toilet seat, Stay With Me (per policy), Toilet paper/wipes in reach, Toileting schedule/hourly rounds, Patient to call for help with toileting needs History of Falls Interventions: Consult care management for discharge planning, Door open when patient unattended, Evaluate medications/consider consulting pharmacy, Room close to nurse's station, Utilize gait belt for transfer/ambulation, Assess for delayed presentation/identification of injury for 48 hrs (comment for end date), Vital signs minimum Q4HRs X 24 hrs (comment for end date) 3/7/2020 0948 by iNcholas Henry RN Outcome: Progressing Towards Goal 
Note: Fall Risk Interventions: 
Mobility Interventions: Assess mobility with egress test, Communicate number of staff needed for ambulation/transfer, OT consult for ADLs, Patient to call before getting OOB, PT Consult for mobility concerns, PT Consult for assist device competence, Strengthening exercises (ROM-active/passive), Utilize walker, cane, or other assistive device, Utilize gait belt for transfers/ambulation Mentation Interventions: Adequate sleep, hydration, pain control, Door open when patient unattended, Evaluate medications/consider consulting pharmacy, Eyeglasses and hearing aids, Reorient patient, More frequent rounding Medication Interventions: Assess postural VS orthostatic hypotension, Evaluate medications/consider consulting pharmacy, Patient to call before getting OOB, Utilize gait belt for transfers/ambulation, Teach patient to arise slowly Elimination Interventions: Call light in reach, Elevated toilet seat, Stay With Me (per policy), Toilet paper/wipes in reach, Toileting schedule/hourly rounds, Patient to call for help with toileting needs History of Falls Interventions: Consult care management for discharge planning, Door open when patient unattended, Evaluate medications/consider consulting pharmacy, Room close to nurse's station, Utilize gait belt for transfer/ambulation, Assess for delayed presentation/identification of injury for 48 hrs (comment for end date), Vital signs minimum Q4HRs X 24 hrs (comment for end date) Problem: Patient Education: Go to Patient Education Activity Goal: Patient/Family Education 3/7/2020 0949 by Paola Giordano RN Outcome: Progressing Towards Goal 
3/7/2020 0948 by Paola Giordano RN Outcome: Progressing Towards Goal 
  
Problem: Patient Education: Go to Patient Education Activity Goal: Patient/Family Education 3/7/2020 0949 by Paola Giordano RN Outcome: Progressing Towards Goal 
3/7/2020 0948 by Paola Giordano RN Outcome: Progressing Towards Goal 
  
Problem: Patient Education: Go to Patient Education Activity Goal: Patient/Family Education 3/7/2020 0949 by Paola Giordano RN Outcome: Progressing Towards Goal 
3/7/2020 0948 by Paola Giordano RN Outcome: Progressing Towards Goal 
  
Problem: Patient Education: Go to Patient Education Activity Goal: Patient/Family Education 3/7/2020 0949 by Paola Giordano RN Outcome: Progressing Towards Goal 
3/7/2020 0948 by Paola Giordano RN Outcome: Progressing Towards Goal

## 2020-03-07 NOTE — PROGRESS NOTES
Problem: Mobility Impaired (Adult and Pediatric) Goal: *Acute Goals and Plan of Care (Insert Text) Description FUNCTIONAL STATUS PRIOR TO ADMISSION: Patient was modified independent using a rollator for functional mobility. HOME SUPPORT PRIOR TO ADMISSION: The patient lived alone with family to provide assistance. Physical Therapy Goals Initiated 3/6/2020 1. Patient will move from supine to sit and sit to supine  in bed with minimal assistance/contact guard assist within 7 day(s). 2.  Patient will transfer from bed to chair and chair to bed with minimal assistance/contact guard assist using the least restrictive device within 7 day(s). 3.  Patient will perform sit to stand with minimal assistance/contact guard assist within 7 day(s). 4.  Patient will ambulate with moderate assistance  for 5 feet with the least restrictive device within 7 day(s). 3/7/2020 1450 by Joaquin Harrison, PTA Outcome: Not Progressing Towards Goal 
3/7/2020 1239 by Joaquin Harrison, PTA Outcome: Not Progressing Towards Goal 
 PHYSICAL THERAPY TREATMENT Patient: Cynthia Farris (89 y.o. female) Date: 3/7/2020 Diagnosis: Closed right hip fracture ([address] [facility] <principal problem not specified> Procedure(s) (LRB): 
RIGHT FEMORAL INTERTROCHANTERIC NAIL INSERTION (Right) 2 Days Post-Op Precautions: TTWB, Fall Chart, physical therapy assessment, plan of care and goals were reviewed. ASSESSMENT Patient continues with skilled PT services and is not progressing towards goals. Pt presents with decreased strength and endurance. Pt performed stand pivot transfer from recliner to bed at mod A x2. Pt able to stand with ability to maintain TTWB but unable to mobilize/swivel maintaining TTWB. Pt educated on use of UEs but pt with difficulty.      
 
Current Level of Function Impacting Discharge (mobility/balance): bed mobility at mod A x2, stand pivot transfer at mod A x2 with RW  
 
 Other factors to consider for discharge: pain, TTWB PLAN : 
Patient continues to benefit from skilled intervention to address the above impairments. Continue treatment per established plan of care. to address goals. Recommendation for discharge: (in order for the patient to meet his/her long term goals) Therapy up to 5 days/week in SNF setting This discharge recommendation: 
Has been made in collaboration with the attending provider and/or case management IF patient discharges home will need the following DME: to be determined (TBD) SUBJECTIVE:  
Patient stated  I'm not getting this right this time.  OBJECTIVE DATA SUMMARY:  
Critical Behavior: 
Neurologic State: Alert, Eyes open spontaneously Orientation Level: Oriented X4 Cognition: Follows commands Safety/Judgement: Awareness of environment, Fall prevention, Insight into deficits Functional Mobility Training: 
Bed Mobility: 
Rolling: Moderate assistance;Assist x2 Supine to Sit: Moderate assistance;Assist x2 Sit to Supine: Moderate assistance;Assist x2 Scooting: Moderate assistance;Assist x2 Transfers: 
Sit to Stand: Moderate assistance;Assist x2 Stand to Sit: Moderate assistance;Assist x2 Bed to Chair: Moderate assistance;Maximum assistance;Assist x2 Balance: 
Sitting: Intact Standing: Impaired; With support Standing - Static: Fair;Constant support Standing - Dynamic : Poor;Constant support Pain Rating: Pt with increased pain with mobility. Activity Tolerance:  
Poor and requires rest breaks Please refer to the flowsheet for vital signs taken during this treatment. After treatment patient left in no apparent distress:  
Supine in bed and Call bell within reach COMMUNICATION/COLLABORATION:  
The patients plan of care was discussed with: Registered nurse. Elder Nova PTA Time Calculation: 13 mins

## 2020-03-07 NOTE — PROGRESS NOTES
Problem: Pressure Injury - Risk of 
Goal: *Prevention of pressure injury Description Document Tevin Scale and appropriate interventions in the flowsheet. Outcome: Progressing Towards Goal 
Note: Pressure Injury Interventions: 
Sensory Interventions: Assess changes in LOC, Assess need for specialty bed, Avoid rigorous massage over bony prominences, Chair cushion, Check visual cues for pain, Discuss PT/OT consult with provider Moisture Interventions: Absorbent underpads, Apply protective barrier, creams and emollients, Assess need for specialty bed, Check for incontinence Q2 hours and as needed, Contain wound drainage Activity Interventions: Assess need for specialty bed, Pressure redistribution bed/mattress(bed type), PT/OT evaluation, Increase time out of bed, Chair cushion Mobility Interventions: Assess need for specialty bed, Chair cushion, Float heels, Pressure redistribution bed/mattress (bed type), PT/OT evaluation, HOB 30 degrees or less Nutrition Interventions: Document food/fluid/supplement intake, Discuss nutritional consult with provider, Offer support with meals,snacks and hydration Friction and Shear Interventions: Apply protective barrier, creams and emollients, Feet elevated on foot rest, Foam dressings/transparent film/skin sealants, HOB 30 degrees or less, Lift sheet Problem: Patient Education: Go to Patient Education Activity Goal: Patient/Family Education Outcome: Progressing Towards Goal 
  
Problem: Falls - Risk of 
Goal: *Absence of Falls Description Document Dorat President Fall Risk and appropriate interventions in the flowsheet.  
Outcome: Progressing Towards Goal 
Note: Fall Risk Interventions: 
Mobility Interventions: Assess mobility with egress test, Communicate number of staff needed for ambulation/transfer, OT consult for ADLs, Patient to call before getting OOB, PT Consult for mobility concerns, PT Consult for assist device competence, Strengthening exercises (ROM-active/passive), Utilize walker, cane, or other assistive device, Utilize gait belt for transfers/ambulation Mentation Interventions: Adequate sleep, hydration, pain control, Door open when patient unattended, Evaluate medications/consider consulting pharmacy, Eyeglasses and hearing aids, Reorient patient, More frequent rounding Medication Interventions: Assess postural VS orthostatic hypotension, Evaluate medications/consider consulting pharmacy, Patient to call before getting OOB, Utilize gait belt for transfers/ambulation, Teach patient to arise slowly Elimination Interventions: Call light in reach, Elevated toilet seat, Stay With Me (per policy), Toilet paper/wipes in reach, Toileting schedule/hourly rounds, Patient to call for help with toileting needs History of Falls Interventions: Consult care management for discharge planning, Door open when patient unattended, Evaluate medications/consider consulting pharmacy, Room close to nurse's station, Utilize gait belt for transfer/ambulation, Assess for delayed presentation/identification of injury for 48 hrs (comment for end date), Vital signs minimum Q4HRs X 24 hrs (comment for end date) Problem: Patient Education: Go to Patient Education Activity Goal: Patient/Family Education Outcome: Progressing Towards Goal 
  
Problem: Patient Education: Go to Patient Education Activity Goal: Patient/Family Education Outcome: Progressing Towards Goal 
  
Problem: Patient Education: Go to Patient Education Activity Goal: Patient/Family Education Outcome: Progressing Towards Goal 
  
Problem: Patient Education: Go to Patient Education Activity Goal: Patient/Family Education Outcome: Progressing Towards Goal

## 2020-03-08 LAB
ANION GAP SERPL CALC-SCNC: 3 MMOL/L (ref 5–15)
BUN SERPL-MCNC: 32 MG/DL (ref 6–20)
BUN/CREAT SERPL: 26 (ref 12–20)
CALCIUM SERPL-MCNC: 8.5 MG/DL (ref 8.5–10.1)
CHLORIDE SERPL-SCNC: 105 MMOL/L (ref 97–108)
CO2 SERPL-SCNC: 29 MMOL/L (ref 21–32)
CREAT SERPL-MCNC: 1.22 MG/DL (ref 0.55–1.02)
ERYTHROCYTE [DISTWIDTH] IN BLOOD BY AUTOMATED COUNT: 13.6 % (ref 11.5–14.5)
GLUCOSE SERPL-MCNC: 97 MG/DL (ref 65–100)
HCT VFR BLD AUTO: 25.9 % (ref 35–47)
HGB BLD-MCNC: 8.1 G/DL (ref 11.5–16)
MCH RBC QN AUTO: 30.6 PG (ref 26–34)
MCHC RBC AUTO-ENTMCNC: 31.3 G/DL (ref 30–36.5)
MCV RBC AUTO: 97.7 FL (ref 80–99)
NRBC # BLD: 0 K/UL (ref 0–0.01)
NRBC BLD-RTO: 0 PER 100 WBC
PLATELET # BLD AUTO: 193 K/UL (ref 150–400)
PMV BLD AUTO: 10.8 FL (ref 8.9–12.9)
POTASSIUM SERPL-SCNC: 4.1 MMOL/L (ref 3.5–5.1)
RBC # BLD AUTO: 2.65 M/UL (ref 3.8–5.2)
SODIUM SERPL-SCNC: 137 MMOL/L (ref 136–145)
WBC # BLD AUTO: 7.9 K/UL (ref 3.6–11)

## 2020-03-08 PROCEDURE — 74011250637 HC RX REV CODE- 250/637: Performed by: INTERNAL MEDICINE

## 2020-03-08 PROCEDURE — 74011250636 HC RX REV CODE- 250/636: Performed by: ORTHOPAEDIC SURGERY

## 2020-03-08 PROCEDURE — 36415 COLL VENOUS BLD VENIPUNCTURE: CPT

## 2020-03-08 PROCEDURE — 80048 BASIC METABOLIC PNL TOTAL CA: CPT

## 2020-03-08 PROCEDURE — 94760 N-INVAS EAR/PLS OXIMETRY 1: CPT

## 2020-03-08 PROCEDURE — 74011250637 HC RX REV CODE- 250/637: Performed by: ORTHOPAEDIC SURGERY

## 2020-03-08 PROCEDURE — 97530 THERAPEUTIC ACTIVITIES: CPT | Performed by: PHYSICAL THERAPIST

## 2020-03-08 PROCEDURE — 85027 COMPLETE CBC AUTOMATED: CPT

## 2020-03-08 PROCEDURE — 65270000029 HC RM PRIVATE

## 2020-03-08 RX ADMIN — Medication 10 ML: at 06:22

## 2020-03-08 RX ADMIN — LEVOTHYROXINE SODIUM 100 MCG: 100 TABLET ORAL at 06:22

## 2020-03-08 RX ADMIN — SENNOSIDES AND DOCUSATE SODIUM 1 TABLET: 8.6; 5 TABLET ORAL at 09:03

## 2020-03-08 RX ADMIN — TRAMADOL HYDROCHLORIDE 50 MG: 50 TABLET, FILM COATED ORAL at 07:03

## 2020-03-08 RX ADMIN — Medication 1 TABLET: at 09:03

## 2020-03-08 RX ADMIN — HEPARIN SODIUM 5000 UNITS: 5000 INJECTION INTRAVENOUS; SUBCUTANEOUS at 09:03

## 2020-03-08 RX ADMIN — Medication 1 TABLET: at 13:35

## 2020-03-08 RX ADMIN — HEPARIN SODIUM 5000 UNITS: 5000 INJECTION INTRAVENOUS; SUBCUTANEOUS at 21:08

## 2020-03-08 RX ADMIN — ACETAMINOPHEN 650 MG: 325 TABLET ORAL at 23:36

## 2020-03-08 RX ADMIN — ACETAMINOPHEN 650 MG: 325 TABLET ORAL at 13:35

## 2020-03-08 RX ADMIN — POLYETHYLENE GLYCOL (3350) 17 G: 17 POWDER, FOR SOLUTION ORAL at 09:03

## 2020-03-08 RX ADMIN — Medication 10 ML: at 21:11

## 2020-03-08 RX ADMIN — TRAMADOL HYDROCHLORIDE 50 MG: 50 TABLET, FILM COATED ORAL at 19:28

## 2020-03-08 RX ADMIN — HYDROCHLOROTHIAZIDE 12.5 MG: 12.5 CAPSULE ORAL at 09:03

## 2020-03-08 RX ADMIN — ASPIRIN 81 MG: 81 TABLET ORAL at 09:03

## 2020-03-08 RX ADMIN — ACETAMINOPHEN 650 MG: 325 TABLET ORAL at 06:22

## 2020-03-08 RX ADMIN — ATORVASTATIN CALCIUM 10 MG: 10 TABLET, FILM COATED ORAL at 21:07

## 2020-03-08 RX ADMIN — Medication 10 ML: at 13:36

## 2020-03-08 NOTE — PROGRESS NOTES
Po hip fracture. Sore but manageable pain. 
hgb stable Patient Vitals for the past 24 hrs: 
 Temp Pulse Resp BP SpO2  
03/08/20 0846    137/51   
03/08/20 0844    (!) 143/38   
03/08/20 0756 98.3 °F (36.8 °C) 95 18 166/63 95 % 03/08/20 0322 98.2 °F (36.8 °C) 81 18 167/60 100 % 03/07/20 2302 98.6 °F (37 °C) 69 16 150/68 100 % 03/07/20 1901 98.4 °F (36.9 °C) 84 18 152/55 98 % 03/07/20 1532 97.5 °F (36.4 °C) 75 18 121/53 100 % 03/07/20 1129 98.1 °F (36.7 °C) 84 18 123/63 95 % Dressing clean and dry Musculoskeletal: John's sign negative in bilateral lower extremities. Calves soft, supple, non-tender upon palpation or with passive stretch. PT: 
 
PLAN : 
Patient continues to benefit from skilled intervention to address the above impairments. Continue treatment per established plan of care. to address goals. 
  
Recommendation for discharge: (in order for the patient to meet his/her long term goals) Therapy up to 5 days/week in SNF setting 
  
This discharge recommendation: 
Has been made in collaboration with the attending provider and/or case management 
  
Continue PT Dc planning Asa for dvt

## 2020-03-08 NOTE — PROGRESS NOTES
Problem: Mobility Impaired (Adult and Pediatric) Goal: *Acute Goals and Plan of Care (Insert Text) Description FUNCTIONAL STATUS PRIOR TO ADMISSION: Patient was modified independent using a rollator for functional mobility. HOME SUPPORT PRIOR TO ADMISSION: The patient lived alone with family to provide assistance. Physical Therapy Goals Initiated 3/6/2020 1. Patient will move from supine to sit and sit to supine  in bed with minimal assistance/contact guard assist within 7 day(s). 2.  Patient will transfer from bed to chair and chair to bed with minimal assistance/contact guard assist using the least restrictive device within 7 day(s). 3.  Patient will perform sit to stand with minimal assistance/contact guard assist within 7 day(s). 4.  Patient will ambulate with moderate assistance  for 5 feet with the least restrictive device within 7 day(s). Note: PHYSICAL THERAPY TREATMENT Patient: Elba Patterson (89 y.o. female) Date: 3/8/2020 Diagnosis: Closed right hip fracture ([address] [facility] <principal problem not specified> Procedure(s) (LRB): 
RIGHT FEMORAL INTERTROCHANTERIC NAIL INSERTION (Right) 3 Days Post-Op Precautions: TTWB, Fall Chart, physical therapy assessment, plan of care and goals were reviewed. ASSESSMENT Patient continues with skilled PT services and is progressing towards goals. Patient with significant pain this morning but willing to attempt transfers. Bed mobility with mod assist x 2. Patient with initial lean posteriorly but improves with time. Patient sits EOB 5 minutes with pain decreasing. Standpivot transfer with mod assist x 2 with therapist's foot under patient's right foot to ensure TTWB. Once up in chair, patient more comfortable. Recommend continued therapy at discharge. Current Level of Function Impacting Discharge (mobility/balance): mod assist x 2 Other factors to consider for discharge: lives alone PLAN : 
 Patient continues to benefit from skilled intervention to address the above impairments. Continue treatment per established plan of care. to address goals. Recommendation for discharge: (in order for the patient to meet his/her long term goals) Therapy up to 5 days/week in SNF setting This discharge recommendation: 
Has been made in collaboration with the attending provider and/or case management IF patient discharges home will need the following DME: to be determined (TBD) SUBJECTIVE:  
Patient stated I'm hurting too much.  OBJECTIVE DATA SUMMARY:  
Critical Behavior: 
Neurologic State: Alert Orientation Level: Oriented X4 Cognition: Follows commands, Appropriate decision making, Appropriate for age attention/concentration, Appropriate safety awareness Safety/Judgement: Awareness of environment, Fall prevention, Insight into deficits Functional Mobility Training: 
Bed Mobility: 
  
Supine to Sit: Moderate assistance;Assist x2 Scooting: Moderate assistance Transfers: 
Sit to Stand: Moderate assistance;Assist x2 Stand to Sit: Moderate assistance;Assist x2 Bed to Chair: Moderate assistance;Assist x2 Balance: 
Sitting: Impaired Sitting - Static: Fair (occasional)(improves to good) Sitting - Dynamic: Fair (occasional) Standing: Impaired Standing - Static: Constant support; Fair 
Standing - Dynamic : Poor Ambulation/Gait Training: 
  
  
  
  
  
  
  
  
  
  
  
  
  
  
  
  
  
  
 
  
  
   
 
Therapeutic Exercises: Ankle pumps only due to pain Pain Rating: No number given but 7/10 faces Activity Tolerance:  
Fair Please refer to the flowsheet for vital signs taken during this treatment. After treatment patient left in no apparent distress:  
Sitting in chair and Call bell within reach COMMUNICATION/COLLABORATION:  
The patients plan of care was discussed with: Registered nurse. Jolene Santo, PT Time Calculation: 21 mins

## 2020-03-08 NOTE — PROGRESS NOTES
Bedside and Verbal shift change report given to [facility] RN (oncoming nurse) by Jacqulynn Buerger RN (offgoing nurse). Report included the following information SBAR, Kardex, OR Summary, Procedure Summary, Intake/Output, MAR and Recent Results.

## 2020-03-08 NOTE — PROGRESS NOTES
Bedside and Verbal shift change report given to [facility] (oncoming nurse) by Sherwin Castro (offgoing nurse). Report included the following information SBAR, Kardex, Procedure Summary, Intake/Output, MAR, Accordion and Recent Results.

## 2020-03-08 NOTE — PROGRESS NOTES
CM spoke with the patient's granddaughter, Jose Mccauley (cell phone: [phone]), to discuss her preference for SNF placement for the patient. She would like for the patient to go to [address] [place] [address] upon d/c. CM sent the patient's referral to [address] [place] [address] via [facility]. The earliest the patient could d/c to [address] [place] [address] would be Tuesday, 3/10/20, as this is when she will meet her 3 night LOS. CM will follow-up with admissions at [address] [place] [address] regarding the patient's referral.  
 
Shay Hassan, HELLEN

## 2020-03-08 NOTE — PROGRESS NOTES
Problem: Mobility Impaired (Adult and Pediatric) Goal: *Acute Goals and Plan of Care (Insert Text) Description FUNCTIONAL STATUS PRIOR TO ADMISSION: Patient was modified independent using a rollator for functional mobility. HOME SUPPORT PRIOR TO ADMISSION: The patient lived alone with family to provide assistance. Physical Therapy Goals Initiated 3/6/2020 1. Patient will move from supine to sit and sit to supine  in bed with minimal assistance/contact guard assist within 7 day(s). 2.  Patient will transfer from bed to chair and chair to bed with minimal assistance/contact guard assist using the least restrictive device within 7 day(s). 3.  Patient will perform sit to stand with minimal assistance/contact guard assist within 7 day(s). 4.  Patient will ambulate with moderate assistance  for 5 feet with the least restrictive device within 7 day(s). 3/8/2020 1312 by Haley Rogers PT Note: PHYSICAL THERAPY TREATMENT Patient: Elmira Scott (89 y.o. female) Date: 3/8/2020 Diagnosis: Closed right hip fracture ([address] Svetlana Alanis <principal problem not specified> Procedure(s) (LRB): 
RIGHT FEMORAL INTERTROCHANTERIC NAIL INSERTION (Right) 3 Days Post-Op Precautions: TTWB, Fall Chart, physical therapy assessment, plan of care and goals were reviewed. ASSESSMENT Patient continues with skilled PT services and is progressing towards goals. Patient performed right LE ex with mod assist.  Bed mobility with mod assist x 2. Fair to good sitting balance EOB. Sit to stand with mod assist x 2. Able to stand with walker x 30 seconds while maintaining TTWB right LE but unable to advance either LE. Patient is able to lift left heel off floor. Returned to supine at end of session. Current Level of Function Impacting Discharge (mobility/balance): mod assist x 2 Other factors to consider for discharge: lives alone PLAN : 
 Patient continues to benefit from skilled intervention to address the above impairments. Continue treatment per established plan of care. to address goals. Recommendation for discharge: (in order for the patient to meet his/her long term goals) Therapy up to 5 days/week in SNF setting or intensive home health therapy program 
 
This discharge recommendation: 
Has been made in collaboration with the attending provider and/or case management IF patient discharges home will need the following DME: to be determined (TBD) SUBJECTIVE:  
Patient stated It feels better.  OBJECTIVE DATA SUMMARY:  
Critical Behavior: 
Neurologic State: Alert Orientation Level: Oriented X4 Cognition: Appropriate decision making, Appropriate for age attention/concentration, Appropriate safety awareness Safety/Judgement: Awareness of environment, Fall prevention, Insight into deficits Functional Mobility Training: 
Bed Mobility: 
  
Supine to Sit: Moderate assistance Sit to Supine: Moderate assistance;Assist x2 Scooting: Moderate assistance Transfers: 
Sit to Stand: Moderate assistance;Assist x2 Stand to Sit: Moderate assistance;Assist x2 Bed to Chair: Moderate assistance;Assist x2 Balance: 
Sitting: Impaired Sitting - Static: Fair (occasional)(fair to good) Sitting - Dynamic: Fair (occasional) Standing: Impaired Standing - Static: Constant support; Fair 
Standing - Dynamic : Poor Ambulation/Gait Training: 
  
  
  
  
  
  
  
  
  
  
  
  
  
  
  
  
  
  
 
  
  
   
 
Therapeutic Exercises: Ankle pumps, heel slides, hip abd/adduction, 10 reps each with mod assist 
Pain Rating: No number given Activity Tolerance:  
Fair Please refer to the flowsheet for vital signs taken during this treatment.  
 
After treatment patient left in no apparent distress:  
Supine in bed, Heels elevated for pressure relief, Call bell within reach, and Side rails x 3 
 
 COMMUNICATION/COLLABORATION:  
The patients plan of care was discussed with: Registered nurse. Chanelle Monroy, PT Time Calculation: 18 mins 3/8/2020 0956 by Caron Miranda PT Note: PHYSICAL THERAPY TREATMENT Patient: Tye Coleman (89 y.o. female) Date: 3/8/2020 Diagnosis: Closed right hip fracture ([address] Robyn Laughlin <principal problem not specified> Procedure(s) (LRB): 
RIGHT FEMORAL INTERTROCHANTERIC NAIL INSERTION (Right) 3 Days Post-Op Precautions: TTWB, Fall Chart, physical therapy assessment, plan of care and goals were reviewed. ASSESSMENT Patient continues with skilled PT services and is progressing towards goals. Patient with significant pain this morning but willing to attempt transfers. Bed mobility with mod assist x 2. Patient with initial lean posteriorly but improves with time. Patient sits EOB 5 minutes with pain decreasing. Standpivot transfer with mod assist x 2 with therapist's foot under patient's right foot to ensure TTWB. Once up in chair, patient more comfortable. Recommend continued therapy at discharge. Current Level of Function Impacting Discharge (mobility/balance): mod assist x 2 Other factors to consider for discharge: lives alone PLAN : 
Patient continues to benefit from skilled intervention to address the above impairments. Continue treatment per established plan of care. to address goals. Recommendation for discharge: (in order for the patient to meet his/her long term goals) Therapy up to 5 days/week in SNF setting This discharge recommendation: 
Has been made in collaboration with the attending provider and/or case management IF patient discharges home will need the following DME: to be determined (TBD) SUBJECTIVE:  
Patient stated I'm hurting too much.  OBJECTIVE DATA SUMMARY:  
Critical Behavior: 
Neurologic State: Alert Orientation Level: Oriented X4 
 Cognition: Follows commands, Appropriate decision making, Appropriate for age attention/concentration, Appropriate safety awareness Safety/Judgement: Awareness of environment, Fall prevention, Insight into deficits Functional Mobility Training: 
Bed Mobility: 
  
Supine to Sit: Moderate assistance;Assist x2 Scooting: Moderate assistance Transfers: 
Sit to Stand: Moderate assistance;Assist x2 Stand to Sit: Moderate assistance;Assist x2 Bed to Chair: Moderate assistance;Assist x2 Balance: 
Sitting: Impaired Sitting - Static: Fair (occasional)(improves to good) Sitting - Dynamic: Fair (occasional) Standing: Impaired Standing - Static: Constant support; Fair 
Standing - Dynamic : Poor Ambulation/Gait Training: 
  
  
  
  
  
  
  
  
  
  
  
  
  
  
  
  
  
  
 
  
  
   
 
Therapeutic Exercises: Ankle pumps only due to pain Pain Rating: No number given but 7/10 faces Activity Tolerance:  
Fair Please refer to the flowsheet for vital signs taken during this treatment. After treatment patient left in no apparent distress:  
Sitting in chair and Call bell within reach COMMUNICATION/COLLABORATION:  
The patients plan of care was discussed with: Registered nurse. Cielo Vera, PT Time Calculation: 21 mins

## 2020-03-08 NOTE — PROGRESS NOTES
Bedside shift change report given to Yamilet French RN  (oncoming nurse) by Amber Trinidad  (offgoing nurse). Report included the following information SBAR and Kardex.

## 2020-03-08 NOTE — PROGRESS NOTES
Hospitalist Progress Note NAME: Marylen Finders :  1912 MRN:  [id number] Assessment / Plan: 
Acute comminuted intertrochanteric fracture right femur s/p  IM Nailing 
mechanical fall POA 
-s/p Right short cephalomedullary nail  
-Pain control with Tylenol and oxycodone 
-On heparin for DVT prophylaxis. Please note aspirin is her home medication. Will consider Eliquis at the time of discharge. 
-Patient is medically cleared for discharge and pending placement in SNF Acute postoperative blood loss anemia 
-Baseline hemoglobin is around 12 - Hb 8.1 today, no need for PRBC Essential HTN POA 
-Resume hydrochlorothiazide Stage 3 chronic kidney disease POA 
-Creatinine is close to baseline of around 1.5. Hypothyroidism POA 
-Continue po levothyroxine History of shingles and post-herpetic neuralgia POA 
-Hold nortryptiline for now Dyslipidemia 
-Continue Lipitor Hard of hearing 
  
Body mass index is 18.79 kg/m². Code status: DNR Prophylaxis: Heparin Recommended Disposition: SNF placement, waiting on family to decide on SNF and case management to arrange Subjective: Chief Complaint / Reason for Physician Visit 
poor historian due to hard of hearing She reports pain is adequately controlled with current regimen Overnight events noted and discussed with nursing No evidence of bleeding Objective: VITALS:  
Last 24hrs VS reviewed since prior progress note. Most recent are: 
Patient Vitals for the past 24 hrs: 
 Temp Pulse Resp BP SpO2  
20 0322 98.2 °F (36.8 °C) 81 18 167/60 100 % 20 2302 98.6 °F (37 °C) 69 16 150/68 100 % 20 1901 98.4 °F (36.9 °C) 84 18 152/55 98 % 20 1532 97.5 °F (36.4 °C) 75 18 121/53 100 % 20 1129 98.1 °F (36.7 °C) 84 18 123/63 95 % 20 0743 98.2 °F (36.8 °C) 89 18 128/58 97 % Intake/Output Summary (Last 24 hours) at 3/8/2020 6662 Last data filed at 3/8/2020 1183 Gross per 24 hour Intake  Output 400 ml Net -400 ml PHYSICAL EXAM: 
General: cooperative, no acute distress   
EENT:  EOMI. Anicteric sclerae. MMM Resp:  CTA bilaterally, no wheezing or rales. No accessory muscle use CV:  Regular  rhythm,  No edema GI:  Soft, Non distended, Non tender.  +Bowel sounds Neurologic:  Alert and awake, normal speech, Psych:   GNot anxious nor agitated Skin:  Right hip incision is CDI Reviewed most current lab test results and cultures  YES Reviewed most current radiology test results   YES Review and summation of old records today    NO Reviewed patient's current orders and MAR    YES 
PMH/SH reviewed - no change compared to H&P 
________________________________________________________________________ Care Plan discussed with: 
  Comments Patient x Family RN x Care Manager Consultant Multidiciplinary team rounds were held today with , nursing, pharmacist and clinical coordinator. Patient's plan of care was discussed; medications were reviewed and discharge planning was addressed. ________________________________________________________________________ Total NON critical care TIME:  35   Minutes Total CRITICAL CARE TIME Spent:   Minutes non procedure based Comments >50% of visit spent in counseling and coordination of care    
________________________________________________________________________ Lucia Meraz MD  
 
Procedures: see electronic medical records for all procedures/Xrays and details which were not copied into this note but were reviewed prior to creation of Plan. LABS: 
I reviewed today's most current labs and imaging studies. Pertinent labs include: 
Recent Labs 03/08/20 
5993 03/07/20 
9758 03/06/20 
6347 WBC 7.9  --  7.1 HGB 8.1* 7.8* 8.1* HCT 25.9*  --  25.7*  
  --  197 Recent Labs 03/08/20 
6725 03/06/20 
4343  136  
K 4.1 3.9  105 CO2 29 28  
GLU 97 100 BUN 32* 32* CREA 1.22* 1.63* CA 8.5 8.1* ALB  --  2.4* TBILI  --  0.9 SGOT  --  21 ALT  --  15 Signed: Cornelio Arroyo MD

## 2020-03-08 NOTE — PROGRESS NOTES
Hospitalist Progress Note NAME: Torito Kaiser :  1912 MRN:  [id number] Assessment / Plan: 
Acute comminuted intertrochanteric fracture right femur s/p  IM Nailing 
mechanical fall POA 
-s/p Right short cephalomedullary nail  
-Pain control with Tylenol and oxycodone 
-On heparin for DVT prophylaxis. Please note aspirin is her home medication. Will consider Eliquis at the time of discharge. 
-Patient is medically cleared for discharge and pending placement in SNF Acute postoperative blood loss anemia 
-Baseline hemoglobin is around 12 and currently hemoglobin is 7.8 
-Check CBC in a.m. 
-Consider PRBC transfusion if hemoglobin is less than 7 Essential HTN POA 
-Resume hydrochlorothiazide Stage 3 chronic kidney disease POA 
-Creatinine is close to baseline of around 1.5. Hypothyroidism POA 
-Continue po levothyroxine History of shingles and post-herpetic neuralgia POA 
-Hold nortryptiline for now Dyslipidemia 
-Continue Lipitor Hard of hearing 
  
Body mass index is 18.79 kg/m². Code status: DNR Prophylaxis: Heparin Recommended Disposition: SNF placement, waiting on family to decide on SNF and case management to arrange Subjective: Chief Complaint / Reason for Physician Visit 
poor historian due to hard of hearing She reports pain is adequately controlled Overnight events noted and discussed with nursing No evidence of bleeding Hemoglobin is 7.8 this morning Objective: VITALS:  
Last 24hrs VS reviewed since prior progress note. Most recent are: 
Patient Vitals for the past 24 hrs: 
 Temp Pulse Resp BP SpO2  
20 2302 98.6 °F (37 °C) 69 16 150/68 100 % 20 1901 98.4 °F (36.9 °C) 84 18 152/55 98 % 20 1532 97.5 °F (36.4 °C) 75 18 121/53 100 % 20 1129 98.1 °F (36.7 °C) 84 18 123/63 95 % 20 0743 98.2 °F (36.8 °C) 89 18 128/58 97 % 20 0354 98.2 °F (36.8 °C) 87 16 132/66 93 % No intake or output data in the 24 hours ending 03/07/20 6946 PHYSICAL EXAM: 
General: cooperative, no acute distress   
EENT:  EOMI. Anicteric sclerae. MMM Resp:  CTA bilaterally, no wheezing or rales. No accessory muscle use CV:  Regular  rhythm,  No edema GI:  Soft, Non distended, Non tender.  +Bowel sounds Neurologic:  Alert and awake, normal speech, Psych:   GNot anxious nor agitated Skin:  Right hip incision is CDI Reviewed most current lab test results and cultures  YES Reviewed most current radiology test results   YES Review and summation of old records today    NO Reviewed patient's current orders and MAR    YES 
PMH/ reviewed - no change compared to H&P 
________________________________________________________________________ Care Plan discussed with: 
  Comments Patient x Family RN x Care Manager Consultant Multidiciplinary team rounds were held today with , nursing, pharmacist and clinical coordinator. Patient's plan of care was discussed; medications were reviewed and discharge planning was addressed. ________________________________________________________________________ Total NON critical care TIME:  35   Minutes Total CRITICAL CARE TIME Spent:   Minutes non procedure based Comments >50% of visit spent in counseling and coordination of care    
________________________________________________________________________ Erika Silver MD  
 
Procedures: see electronic medical records for all procedures/Xrays and details which were not copied into this note but were reviewed prior to creation of Plan. LABS: 
I reviewed today's most current labs and imaging studies. Pertinent labs include: 
Recent Labs 03/07/20 
7799 03/06/20 
8430 WBC  --  7.1 HGB 7.8* 8.1* HCT  --  25.7*  
PLT  --  197 Recent Labs 03/06/20 
3399 03/05/20 
7787  136  
K 3.9 4.0  
 103 CO2 28 28  130* BUN 32* 35* CREA 1.63* 1.57* CA 8.1* 9.0 ALB 2.4*  --   
TBILI 0.9  --   
SGOT 21  --   
ALT 15  --   
 
 
Signed: Alverto Stoll MD

## 2020-03-09 VITALS
WEIGHT: 136.5 LBS | OXYGEN SATURATION: 96 % | TEMPERATURE: 98.2 F | DIASTOLIC BLOOD PRESSURE: 72 MMHG | BODY MASS INDEX: 21.43 KG/M2 | HEIGHT: 67 IN | RESPIRATION RATE: 16 BRPM | HEART RATE: 82 BPM | SYSTOLIC BLOOD PRESSURE: 160 MMHG

## 2020-03-09 LAB
ERYTHROCYTE [DISTWIDTH] IN BLOOD BY AUTOMATED COUNT: 13.6 % (ref 11.5–14.5)
HCT VFR BLD AUTO: 22.9 % (ref 35–47)
HGB BLD-MCNC: 7.4 G/DL (ref 11.5–16)
MCH RBC QN AUTO: 31.1 PG (ref 26–34)
MCHC RBC AUTO-ENTMCNC: 32.3 G/DL (ref 30–36.5)
MCV RBC AUTO: 96.2 FL (ref 80–99)
NRBC # BLD: 0 K/UL (ref 0–0.01)
NRBC BLD-RTO: 0 PER 100 WBC
PLATELET # BLD AUTO: 224 K/UL (ref 150–400)
PMV BLD AUTO: 10.3 FL (ref 8.9–12.9)
RBC # BLD AUTO: 2.38 M/UL (ref 3.8–5.2)
WBC # BLD AUTO: 6.5 K/UL (ref 3.6–11)

## 2020-03-09 PROCEDURE — 74011250637 HC RX REV CODE- 250/637: Performed by: INTERNAL MEDICINE

## 2020-03-09 PROCEDURE — 36415 COLL VENOUS BLD VENIPUNCTURE: CPT

## 2020-03-09 PROCEDURE — 74011250636 HC RX REV CODE- 250/636: Performed by: ORTHOPAEDIC SURGERY

## 2020-03-09 PROCEDURE — 94760 N-INVAS EAR/PLS OXIMETRY 1: CPT

## 2020-03-09 PROCEDURE — 97530 THERAPEUTIC ACTIVITIES: CPT

## 2020-03-09 PROCEDURE — 85027 COMPLETE CBC AUTOMATED: CPT

## 2020-03-09 PROCEDURE — 74011250637 HC RX REV CODE- 250/637: Performed by: ORTHOPAEDIC SURGERY

## 2020-03-09 RX ORDER — OXYCODONE HYDROCHLORIDE 5 MG/1
5 TABLET ORAL
Qty: 15 TAB | Refills: 0 | Status: SHIPPED | OUTPATIENT
Start: 2020-03-09 | End: 2020-03-14

## 2020-03-09 RX ADMIN — ACETAMINOPHEN 650 MG: 325 TABLET ORAL at 05:32

## 2020-03-09 RX ADMIN — HEPARIN SODIUM 5000 UNITS: 5000 INJECTION INTRAVENOUS; SUBCUTANEOUS at 09:11

## 2020-03-09 RX ADMIN — Medication 10 ML: at 05:32

## 2020-03-09 RX ADMIN — LEVOTHYROXINE SODIUM 50 MCG: 50 TABLET ORAL at 05:36

## 2020-03-09 RX ADMIN — SENNOSIDES AND DOCUSATE SODIUM 1 TABLET: 8.6; 5 TABLET ORAL at 09:06

## 2020-03-09 RX ADMIN — ACETAMINOPHEN 650 MG: 325 TABLET ORAL at 12:11

## 2020-03-09 RX ADMIN — Medication 1 TABLET: at 09:06

## 2020-03-09 RX ADMIN — ASPIRIN 81 MG: 81 TABLET ORAL at 09:05

## 2020-03-09 RX ADMIN — POLYETHYLENE GLYCOL (3350) 17 G: 17 POWDER, FOR SOLUTION ORAL at 09:10

## 2020-03-09 RX ADMIN — HYDROCHLOROTHIAZIDE 12.5 MG: 12.5 CAPSULE ORAL at 09:05

## 2020-03-09 RX ADMIN — Medication 1 TABLET: at 12:11

## 2020-03-09 NOTE — DISCHARGE SUMMARY
Hospitalist Discharge Summary Patient ID: Aubrey Gorman [id number] 
20 y.o. 
2/14/1912 
3/4/2020 PCP on record: Eneida Julio MD 
 
Admit date: 3/4/2020 Discharge date and time: 3/9/2020 DISCHARGE DIAGNOSIS: 
 
Acute comminuted intertrochanteric fracture right femur s/p  IM Nailing 
mechanical fall POA Acute postoperative blood loss anemia Essential HTN POA Stage 3 chronic kidney disease POA Hypothyroidism POA History of shingles and post-herpetic neuralgia POA Dyslipidemia Hard of hearing CONSULTATIONS: 
IP CONSULT TO ORTHOPEDIC SURGERY Excerpted HPI from H&P of Jenn Cummings MD: 
 
8 year old WF 
  
Alert and oriented, lives by herself and does her own ADLs Very hard of hearing, I had some difficulty communicating with her 
  
Says recently she has been feeling well, no recent illnesses or chest pain or shortness of breath Today was walking with some plates from her down to her kitchen Lost her balance and fell to the floor \"I thought I hurt my buttocks the worst\" No headache, significant head trauma, loss of consciousness, palpitations, chest pain. 
  
Moderate pain in her right hip after the fall, was not able to get up Able to crawl back to her Geralynn Cork and call her granddaughter Brought to the emergency room 
  
Emergency  Room Markedly hypertensive 218/97 Right leg was externally rotated and shortened, pain on motion of the hip Right hip x-ray showed an acute comminuted intertrochanteric fracture of the right femur We were called to admit the patient 
  
 
______________________________________________________________________ DISCHARGE SUMMARY/HOSPITAL COURSE:  for full details see H&P, daily progress notes, labs, consult notes. Hospital course problem wise Acute comminuted intertrochanteric fracture right femur s/p  IM Nailing 
mechanical fall POA Acute postoperative blood loss anemia Patient underwent a left lower medullary nailing on 3/5. She was started on Tylenol and oxycodone for pain control which he tolerated well. She received heparin for DVT prophylaxis and was started on Eliquis at the time of discharge. She was cleared by orthopedics to be discharged for outpatient follow-up. She did develop postoperative anemia and her hemoglobin today 7.4 and she is being advised to recheck her hemoglobin in about 2 days time and consider transfusion if hemoglobin is less than 7. 
 
 Essential HTN POA Continue hydrochlorothiazide 
  
Stage 3 chronic kidney disease POA 
-Creatinine is close to baseline of around 1.5. 
  
Hypothyroidism POA 
-Continue po levothyroxine History of shingles and post-herpetic neuralgia POA 
 
  
Dyslipidemia 
-Continue Lipitor 
  
Hard of hearing 
  
Patient was seen and examined today. Vital signs are stable. Lab work is close to baseline except for hemoglobin. She was cleared by orthopedics to be discharged for outpatient follow-up. 
 
 
_______________________________________________________________________ Patient seen and examined by me on discharge day. Pertinent Findings: 
Gen:    Not in distress Chest: Clear lungs CVS:   Regular rhythm. No edema Abd:  Soft, not distended, not tender Neuro:  Alert, awake 
_______________________________________________________________________ DISCHARGE MEDICATIONS:  
Current Discharge Medication List  
  
START taking these medications Details  
oxyCODONE IR (ROXICODONE) 5 mg immediate release tablet Take 1 Tab by mouth every six (6) hours as needed for Pain for up to 5 days. Max Daily Amount: 20 mg. 
Qty: 15 Tab, Refills: 0 Associated Diagnoses: Closed displaced intertrochanteric fracture of right femur, initial encounter ([facility])  
  
apixaban (ELIQUIS) 2.5 mg tablet Take 1 Tab by mouth two (2) times a day for 30 days. Qty: 60 Tab, Refills: 0 CONTINUE these medications which have NOT CHANGED Details  
hydroCHLOROthiazide (MICROZIDE) 12.5 mg capsule TAKE 1 CAPSULE BY MOUTH ONCE DAILY Qty: 90 Cap, Refills: 3  
  
multivit-min-FA-lycopen-lutein (CENTRUM SILVER) 0.4-300-250 mg-mcg-mcg tab Take  by mouth. simvastatin (ZOCOR) 20 mg tablet TAKE ONE TABLET BY MOUTH NIGHTLY Qty: 30 Tab, Refills: 3 Comments: Please consider 90 day supplies to promote better adherence  
  
levothyroxine (SYNTHROID) 50 mcg tablet TAKE 2 TABLETS BY MOUTH ON SATURDAY AND SUNDAY AND TAKE 1 TABLET BY MOUTH THE OTHER 5 DAYS Qty: 40 Tab, Refills: 11 Comments: Please consider 90 day supplies to promote better adherence Associated Diagnoses: Hypothyroidism due to acquired atrophy of thyroid  
  
nortriptyline (PAMELOR) 10 mg capsule TAKE 1 TO 2 CAPSULES BY MOUTH AT BEDTIME FOR  SHINGLES  PAIN Qty: 60 Cap, Refills: 5 Comments: Please consider 90 day supplies to promote better adherence  
  
vit C,Z-Pl-ytbqz-lutein-zeaxan (PRESERVISION AREDS-2) 572-592-71-1 mg-unit-mg-mg cap capsule Take 1 Cap by mouth two (2) times daily (with meals). aspirin delayed-release 81 mg tablet Take 1 Tab by mouth daily. Qty: 90 Tab, Refills: 3 Associated Diagnoses: Asymptomatic PVD (peripheral vascular disease) (HCC)  
  
polyethylene glycol (MIRALAX) 17 gram/dose powder Take 17 g by mouth two (2) times a day. 1 tablespoon with 8 oz of water daily 
Qty: 510 g, Refills: 0  
  
acetaminophen (TYLENOL EXTRA STRENGTH) 500 mg tablet Take  by mouth every six (6) hours as needed. Patient Follow Up Instructions: 1. Recommended diet: Cardiac 2. Recommended activity: Activity as tolerated 3. If you experience any of the following symptoms then please call your primary care physician or return to the emergency room if you cannot get hold of your doctor: 
 
4. Wound Care: Keep incision clean and Dry and Call Dr Anna Patel at phone below if you have any problems 5. Lab work: Cbc on 3/12/20 with results faxed to Dr Sia Bennett and Dr Gabe Kellogg 6. Stop taking blood thinner if you have any bleeding and call Dr Sia Bennett 7. Bring these papers with you to your follow up appointments. The papers will help your doctors be sure to continue the care plan from the hospital. 
 
 
 
 
Follow-up Information Follow up With Specialties Details Why Contact Info [address] [place] [address] [address]   [address] [address] 
[phone] Heaven Crooks MD Internal Medicine   [address] [address] [phone] Heaven Crooks MD Internal Medicine Schedule an appointment as soon as possible for a visit in 1 week  [address] [address] [phone] Nicholas Liang DO Orthopedic Surgery Schedule an appointment as soon as possible for a visit in 1 week  [address] [facility] [address] [facility] 
[phone] 
  
  
 
________________________________________________________________ Risk of deterioration: High 
 
Condition at Discharge:  Stable 
__________________________________________________________________ Disposition SNF/LTC 
 
____________________________________________________________________ Code Status: DNR/DNI 
___________________________________________________________________ Total time in minutes spent coordinating this discharge (includes going over instructions, follow-up, prescriptions, and preparing report for sign off to her PCP) :  35  minutes Signed: 
Cheyenne Xie MD

## 2020-03-09 NOTE — PROGRESS NOTES
Problem: Mobility Impaired (Adult and Pediatric) Goal: *Acute Goals and Plan of Care (Insert Text) Description FUNCTIONAL STATUS PRIOR TO ADMISSION: Patient was modified independent using a rollator for functional mobility. HOME SUPPORT PRIOR TO ADMISSION: The patient lived alone with family to provide assistance. Physical Therapy Goals Initiated 3/6/2020 1. Patient will move from supine to sit and sit to supine  in bed with minimal assistance/contact guard assist within 7 day(s). 2.  Patient will transfer from bed to chair and chair to bed with minimal assistance/contact guard assist using the least restrictive device within 7 day(s). 3.  Patient will perform sit to stand with minimal assistance/contact guard assist within 7 day(s). 4.  Patient will ambulate with moderate assistance  for 5 feet with the least restrictive device within 7 day(s). Outcome: Not Progressing Towards Goal 
 PHYSICAL THERAPY TREATMENT Patient: Gino Barnett (89 y.o. female) Date: 3/9/2020 Diagnosis: Closed right hip fracture ([address] Brandt Sonia <principal problem not specified> Procedure(s) (LRB): 
RIGHT FEMORAL INTERTROCHANTERIC NAIL INSERTION (Right) 4 Days Post-Op Precautions: TTWB, Fall Chart, physical therapy assessment, plan of care and goals were reviewed. ASSESSMENT Patient continues with skilled PT services and is progressing towards goals. Pt presents with decreased strength and increased pain with mobility. Pt performed bed mobility at min A/mod A x2. Pt with increased pain sitting EOB. Pt performed sit to stand transfer at mod A/min A x2. Pt able to swivel to [facility] with RW at [facility] x2. Pt requiring constant cueing to maintain TTWB. Pt unable to stand for hygiene requiring rolling in bed at mod A for hygiene.      
 
Current Level of Function Impacting Discharge (mobility/balance): bed mobility at min A/mod A x2 ,sit to stand transfer at mod A/min A x2  
 
 Other factors to consider for discharge: pain, TTWB PLAN : 
Patient continues to benefit from skilled intervention to address the above impairments. Continue treatment per established plan of care. to address goals. Recommendation for discharge: (in order for the patient to meet his/her long term goals) Therapy up to 5 days/week in SNF setting This discharge recommendation: 
Has been made in collaboration with the attending provider and/or case management IF patient discharges home will need the following DME: patient owns DME required for discharge SUBJECTIVE:  
Patient stated  I think I need to use the restroom.  OBJECTIVE DATA SUMMARY:  
Critical Behavior: 
Neurologic State: Alert Orientation Level: Appropriate for age Cognition: Appropriate for age attention/concentration Safety/Judgement: Awareness of environment, Fall prevention, Insight into deficits Functional Mobility Training: 
Bed Mobility: 
  
Supine to Sit: Minimum assistance;Assist x2 Sit to Supine: Moderate assistance;Assist x2 Scooting: Moderate assistance;Minimum assistance Transfers: 
Sit to Stand: Minimum assistance; Moderate assistance;Assist x2 Stand to Sit: Moderate assistance;Minimum assistance;Assist x2 Bed to Chair: Minimum assistance; Moderate assistance; Additional time;Assist x2 Balance: 
Sitting: Impaired Sitting - Static: Fair (occasional) Sitting - Dynamic: Fair (occasional) Standing: Impaired; With support Standing - Static: Fair;Constant support Standing - Dynamic : Poor;Constant support Ambulation/Gait Training: 
  
  
  
 Pt unable to take any steps Pain Rating: Pt with increased pain with all mobility. Activity Tolerance:  
Fair, requires rest breaks, and observed SOB with activity Please refer to the flowsheet for vital signs taken during this treatment. After treatment patient left in no apparent distress: Supine in bed, Call bell within reach, and Side rails x 3 
 
COMMUNICATION/COLLABORATION:  
The patients plan of care was discussed with: Registered nurse. Juan Carmona PTA Time Calculation: 39 mins

## 2020-03-09 NOTE — PROGRESS NOTES
ODETTE: [facility] 
1) call report [phone] patient going to room 103B 2) [facility] at [facility]:00[facility] today MD has cleared patient for d/c. CM and CM lead verified that patient has qualified hospital stay for SNF admission today. [facility] confirmed that they can accept patient today. [facility] arranged for 1:00pm today. CM contacted patient's granddaughter who approved d/c and time. CM contacted MD requesting DDNR be signed for patient. Medicare pt has received, reviewed, and signed 2nd IM letter informing them of their right to appeal the discharge. Signed copy has been placed on pt bedside chart. Transition of Care Plan to SNF/Rehab 
 
SNF/Rehab Transition: 
Patient has been accepted to [facility] and meets criteria for admission. Patient will transported by [facility] and expected to leave at 1:00pm. 
 
Communication to Patient/Family: Met with patient and granddaughter(identified care giver) and they are agreeable to the transition plan. Communication to SNF/Rehab: 
Bedside RN, Johnny Lorenzana, has been notified to update the transition plan to the facility and call report (phone number [phone]). Discharge information has been updated on the AVS. Discharge instructions to be fax'd to facility at [facility] # [phone]). Patient has been identified as part of the n/a BCPI-A Program.  For Care Coordination associated with that Bundle Program, please contact n/a Bundle information has been communication to n/a Nursing Please include all hard scripts for controlled substances, med rec and dc summary, and AVS in packet. Reviewed and confirmed with facility, [facility], can manage the patient care needs for the following:  
 
Jennifer Ramirez with (X) only those applicable: 
 
Medication: 
[x]  Medications will be available at the facility []  IV Antibiotics [x]  Controlled Substance - hard copy to be sent with patient  
[]  Weekly Labs Documents: 
[x] Hard RX [x] MAR [x] Kardex [x] AVS 
[x]Transfer Summary 
[x]Discharge Equipment: 
[]  CPAP/BiPAP []  Wound Vacuum 
[]  Martínez or Urinary Device 
[]  PICC/Central Line 
[]  Nebulizer 
[]  Ventilator Treatment: 
[]Isolation (for MRSA, VRE, etc.) []Surgical Drain Management []Tracheostomy Care 
[]Dressing Changes []Dialysis with transportation and chair time. []PEG Care []Oxygen []Daily Weights for Heart Failure Dietary: 
[]Any diet limitations []Tube Feedings []Total Parenteral Management (TPN) Eligible for Medicaid Long Term Services and Supports Yes: 
[] Eligible for medical assistance or will become eligible within 180 days and UAI completed. [] Provider/Patient and/or support system has requested screening. [] UAI copy provided to patient or responsible party. [] UAI unavailable at discharge will send once processed to SNF provider. [] UAI unavailable at discharged mailed to patient No:  
[] Private pay and is not financially eligible for Medicaid within the next 180 days. [] Reside out-of-state. [] A residents of a state owned/operated facility that is licensed 
by Department [facility] or [facility] 
[] Enrollment in [facility] hospice services 
[] [address] 
[x] Patient /Family declines to have screening completed or provide financial information for screening Financial Resources: 
Medicaid   
[] Initiated and application pending  
[] Full coverage Advanced Care Plan: 
[]Surrogate Decision Maker of Care 
[]POA [x]Communicated Code Status DNR Other Beverly Caal, [address] [facility], [address]

## 2020-03-09 NOTE — PROGRESS NOTES
Bedside and Verbal shift change report given to Tanika Spann RN  (oncoming nurse) by Mitch Mccall  (offgoing nurse). Report included the following information SBAR, Kardex, Procedure Summary, Intake/Output, MAR, Accordion and Recent Results.

## 2020-03-09 NOTE — PROGRESS NOTES
Ortho / Neurosurgery NP Note POD# 4  s/p RIGHT FEMORAL INTERTROCHANTERIC NAIL INSERTION Pt seen with student nurse at bedside Pt resting in bed. Alert, oriented No complaints. VSS Afebrile. Voiding status: + void Output (mL) Urine Voided: 500 ml (03/09/20 0576) Last Bowel Movement Date: 03/09/20 (03/09/20 0930) Unmeasurable Output Urine Occurrence(s): 1 (03/07/20 1230) Labs Lab Results Component Value Date/Time HGB 7.4 (L) 03/09/2020 03:35 AM  
  
Lab Results Component Value Date/Time INR 1.0 03/04/2020 10:04 PM  
  
 
Recent Glucose Results: No results found for: GLU, GLUPOC, GLUCPOC Body mass index is 21.38 kg/m². : A BMI > 30 is classified as obesity and > 40 is classified as morbid obesity. Opsite Dressing c.d.i Cryotherapy in place over incision Calves soft and supple; No pain with passive stretch Sensation and motor intact SCDs for mechanical DVT proph while in bed PLAN: 
1) PT BID 
2) Aspirin and Heparin for DVT Prophylaxis. Discussed with Dr. Crystal Sanchez and he is ok with Dr. Wright Me recommendation of Eliquis for discharge. 3) Readniess for discharge: 
   [x] Vital Signs stable  
 [x] Hgb stable  
 [x] + Voiding  
 [x] Wound intact, drainage minimal  
 [x] Tolerating PO intake   
 [] Cleared by PT (OT if applicable) [] Stair training completed (if applicable) [] Independent / Contact Guard Assist (household distance) [] Bed mobility [] Car transfers  
  [] ADLs [x] Adequate pain control on oral medication alone [address] today. Stable for d/c from ortho perspective Hussain Wright NP 
DNP, ACNP-BC, ONP-C

## 2020-03-09 NOTE — PROGRESS NOTES
Patient had a DNR form signed by Dr. Ceci Cagle Patient's granddaughter had questions about DNR. I explained about what is DNR. It appeared to me that patient also had some questions so I answered all her questions Patient has hard of hearing and is difficult to communicate. I spent about additional 20 minutes discussing DNR. After extensive discussion, patient and granddaughter wanted to be DNR.

## 2020-03-09 NOTE — PROGRESS NOTES
CM verified patient has a qualifying hospital stay for [facility]. Initial Inpatient Order was submitted on 3/4/2020. Jelena Melendrez Setting Care Manager - ED [facility] Advanced Steps ACP Facilitator Zone Phone: [phone] Mobile: [phone]

## 2020-03-09 NOTE — PROGRESS NOTES
Verbal report given o Aaron Shanks, RN at [facility] [address], [facility], Recent result and all pertinent information. Opportunity for questions given, all questions answered. IV removed per policy with cath tip intact. Pt to be transported [facility] at 1300.

## 2020-03-09 NOTE — DISCHARGE INSTRUCTIONS
Patient Discharge Instructions    [facility] / [id number] : 1912    Admitted 3/4/2020 Discharged: 3/9/2020         DISCHARGE DIAGNOSIS:   Acute comminuted intertrochanteric fracture right femur s/p  IM Nailing  mechanical fall POA  Acute postoperative blood loss anemia  Essential HTN POA  Stage 3 chronic kidney disease POA  Hypothyroidism POA  History of shingles and post-herpetic neuralgia POA  Dyslipidemia  Hard of hearing                  [facility]  Surgery: Hip Fracture Repair  Surgeon:   Rosanne Ojeda DO  Surgery Date:  3/5/2020     To relieve pain:   Use ice/gel packs.  Put the ice pack directly over the wound, or anywhere you are hurting or swollen.  To control pain and swelling, keep ice on regularly, especially after physical activity.  The packs should stay cold for 3-4 hours. When it is not cold anymore, rotate with the packs in the freezer.  Elevate your leg. This will also keep swelling down.  Rest for at least 20 minutes between activity or exercises.  To keep track of your pain medications, write down what you take and when you take it.  The last dose of pain medication you got in the hospital was:     Medication    Dose    Date & Time         Choose your medications based on the pain scale below:     To keep your pain under control, take Tylenol every 6 hours for 14 days - even if you feel like you dont need it.  For mild to moderate pain (1-6 on pain scale), take one pain pill every 3-4 hours as needed.  For severe pain (7-10 on pain scale), take two pain pills every 3-4 hours as needed.  To prevent nausea, take your pain medications with food. Pain Scale                 As your pain lessens:     Slowly start taking less pain medication. You may do this by waiting longer between doses or by taking smaller doses.      Stop using the pain medications as soon as you no longer need it, usually in 2-3 weeks.  Eliquis - take this as prescribed to protect you from blood clots             To prevent stomach upset or bleeding:   Take Pepcid 20 mg twice a day, or a similar home medication, while you are taking a blood thinner.  Do not take non-steroidal anti-inflammatory (NSAID) medications (Ibuprofen, Advil, Motrin, Naproxen, etc.)                                               OPSITE (Honeycomb dressing)     Keep your clear, waterproof dressing in place for 5-7 days after your leave the hospital.      If you are still having drainage, you will need to change your dressing in 5-7 days. You will be given one extra dressing to use at home.  If there is no more drainage from the wound, you may leave it open to the air. OPSITE DRESSING INSTRUCTIONS                                DO NOTs:   Do not rub your surgical wound   Do not put lotion or oils on your wound.  Do not take a tub bath or go swimming until your doctor says it is ok.  You may shower with this dressing over your wound.  After showering pat the dressing dry.  If you have staples a home health nurse will remove them in about 10 days.  To increase and promote healing:     Stop Smoking (or at least cut back on       Smoking).  Eat a well-balanced diet (high in protein       and vitamin C).  If you have a poor appetite, drink Ensure, Glucerna, or CarnationInstant Breakfast for the next 30 days.  If you are diabetic, you should control your blood         sugars to prevent infection and help your wound         to heal.       To prevent constipation, stay active & drink plenty of fluid.  While using pain medications, you should also take stool softeners and laxatives, such as Pericolace and Miralax.  If you are having too many bowel movements, then you may need  to stop taking the laxatives.      You should have a bowel movement 3-4 days after surgery and then at least every other day while on pain medication.  To improve your recovery, you must be active!  WEIGHT BEARING   Toe-Touch or Partial = You can let your toes touch the ground but may not put all of your weight on that leg.  THERAPY   If you go to a rehab facility, physical therapy (PT) will need to work with you daily, and sometimes twice a day to teach you how to:     Get in and out of the bed   Walk (gait training) and climb stairs   Do exercises and gain strength   Use a walker, crutches or cane     You may also need to have occupational therapy (OT) work with you to help you practice:     Getting on and off the toilet   Self-care (brushing teeth, eating, bathing, etc)     If you go directly home, home health physical therapy will come the day after you leave the hospital.  They will visit about 4 times over the next couple of weeks to teach you how to get out of bed, to safely walk in your home, and to do your exercises.  NO DRIVING until your surgeon tells you it is ok.  You can return to work when cleared by a physician.  Please call your physician immediately if you have:     Constant bleeding from your wound.  Increasing redness or swelling around your wound (Some warmth, bruising and swelling is normal).  Change in wound drainage (increase in amount, color, or bad smell).  Change in mental status (unusual behavior)     Temperature over 101.5 degrees Fahrenheit     Increased pain, swelling, or redness in the calf (back of your lower leg), thigh, ankle or foot.  Emergency: CALL 911 if you have:   Shortness of breath   Chest pain when you cough or taking a deep breath     Please call your surgeons office at Dr. Abebe Hernandez for a follow up appointment.  You should call as soon as you get home or the next day after discharge. Ask to make an appointment in 2 weeks.                        Take Home Medications     {Medication reconciliation information is now added to the patient's AVS automatically when it is printed. There is no need to use this SmartLink in discharge instructions. Highlight this text and delete it to clear this message}      General drug facts     If you have a very bad allergy, wear an allergy ID at all times. It is important that you take the medication exactly as they are prescribed. Keep your medication in the bottles provided by the pharmacist.  Keep a list of all your drugs (prescription, natural products, vitamins, OTC) with you. Give this list to your doctor. Do not take other medications without consulting your doctor. Do not share your drugs with others and do not take anyone else's drugs. Keep all drugs out of the reach of children and pets. Most drugs may be thrown away in household trash after mixing with coffee grounds or adrian litter and sealing in a plastic bag. Keep a list Call your doctor for help with any side effects. If in the [place] you may also call the FDA at 9-636-LVU-2499    Talk with the doctor before starting any new drug, including OTC, natural products, or vitamins. What to do at Home    1. Recommended diet: Cardiac    2. Recommended activity: As above    3. If you experience any of the following symptoms then please call your primary care physician or return to the emergency room if you cannot get hold of your doctor:    4. Wound Care: Keep incision clean and Dry and Call Dr Faheem Wiley at phone below if you have any problems    5. Lab work: Cbc on 3/12/20 with results faxed to Dr Faheem Wiley and Dr Howard Sales    6. Stop taking blood thinner if you have any bleeding and call Dr Faheem Wiley    7. Bring these papers with you to your follow up appointments.  The papers will help your doctors be sure to continue the care plan from the hospital.      Follow-up with:   PCP: Sasha Espana MD  Follow-up Information     Follow up With Specialties Details Why [address] [facility] Oscar Barrientos [address]    Imani Alan MD Internal Medicine   [address]  [phone]      Imani Alan MD Internal Medicine Schedule an appointment as soon as possible for a visit in 1 week  [address]  [phone]      Rosanne Ojeda,  Orthopedic Surgery Schedule an appointment as soon as possible for a visit in 1 week  [address] [facility]  [phone]             Please call for your own appointment        Information obtained by :  I understand that if any problems occur once I am at home I am to contact my physician. I understand and acknowledge receipt of the instructions indicated above.                                                                                                                                            Physician's or R.N.'s Signature                                                                  Date/Time                                                                                                                                              Patient or Representative Signature                                                          Date/Time

## 2020-03-10 ENCOUNTER — PATIENT OUTREACH (OUTPATIENT)
Dept: FAMILY MEDICINE CLINIC | Age: 85
End: 2020-03-10

## 2020-03-13 ENCOUNTER — PATIENT OUTREACH (OUTPATIENT)
Dept: CASE MANAGEMENT | Age: 85
End: 2020-03-13

## 2020-03-13 NOTE — PROGRESS NOTES
Community Care Team documentation for patient in [facility] Initial Follow Up Patient was discharged to [address] [place] [address]. Information included in this progress note has been provided to SNF. Hospital Admission and Diagnosis: [facility] 03/04/2020-3/09/2020 Right femur fracture PCP : Leroy Yousif MD 
 
SNF Attending:  Servando Spicer MD 
 
Spoke with SNF team.  PT/OT Update: bed mobility min assist; transfer max assist; toe touch weight bearing; non-ambulatory; upper body ADLS min assist; lower body ADLS mod assist; toileting max assist. LOS/Disposition: projected 4-6 weeks. Community Care Team will follow up weekly with [facility] until discharge. Medications were not reconciled and general patient assessment was not completed during this [facility] outreach. [place], [place] 
[phone]. This note will not be viewable in [address]

## 2020-03-20 ENCOUNTER — PATIENT OUTREACH (OUTPATIENT)
Dept: CASE MANAGEMENT | Age: 85
End: 2020-03-20

## 2020-03-20 DIAGNOSIS — Z47.89 ORTHOPEDIC AFTERCARE: Primary | ICD-10-CM

## 2020-03-20 NOTE — PROGRESS NOTES
Late chart- SNF call [email or web address]  [facility] Team Documentation for Patient in [facility]  Subsequent Follow up     Patient remains at [address] ([facility]). See previous [facility] Team notes. Spoke with SNF team. PT/OT currently min assist bed mobility, mod assist transfers, pt is non-ambulatory toe touch weight bearing, UB ADL's min assist, LB ADL's and toileting mod assist.  LOS/Disposition: projected 4-6 weeks. Medications were not reconciled and general patient assessment was not completed during this skilled nursing facility outreach.      Shoaib Barone RN-CCT  [phone]

## 2020-03-25 ENCOUNTER — PATIENT OUTREACH (OUTPATIENT)
Dept: FAMILY MEDICINE CLINIC | Age: 85
End: 2020-03-25

## 2020-03-25 ENCOUNTER — PATIENT OUTREACH (OUTPATIENT)
Dept: CASE MANAGEMENT | Age: 85
End: 2020-03-25

## 2020-03-25 NOTE — PROGRESS NOTES
Community Care Team Documentation for Patient in [facility] Subsequent Follow up Patient remains at [address] ([facility]). See previous [facility] Team notes. Isabella Ramos Spoke with SNF team.  PT/OT/ST Update: independent bed mobility; independent with transfers; ambulating 150 feet with no assistive device supervision; up and down 3 stairs supervision; upper body ADLS independent; lower body ADLS and toileting supervision; eating regular solids; nectar thick liquids. LOS/Disposition: projected discharge 04/09/2020; family trying to decide between assisted living and returning home. Facility to reach out to patient later this week. Medications were not reconciled and general patient assessment was not completed during this skilled nursing facility outreach. Darleen 23, Alyse, [address] This note will not be viewable in [address]

## 2020-03-25 NOTE — PROGRESS NOTES
Patient remains at [facility]). Transition of care outreach postponed for 14 days.

## 2020-03-25 NOTE — PROGRESS NOTES
CORRECTED NOTE: 
 
[facility] Team Documentation for Patient in [facility] Subsequent Follow up Patient remains at [address] ([facility]). See previous [facility] Team notes. Spoke with SNF team.  PT/OT Update:  Bed mobility min assist; transfers mod assist; standing mod assist, non-weight bearin patito right leg; upper body ADLS supervision; lower body ADLS and toileting mod assist. LOS/Disposition: 4-6 weeks. Medications were not reconciled and general patient assessment was not completed during this skilled nursing facility outreach. Darleen 23, [place], [address]. This note will not be viewable in [address]

## 2020-04-01 ENCOUNTER — PATIENT OUTREACH (OUTPATIENT)
Dept: CASE MANAGEMENT | Age: 85
End: 2020-04-01

## 2020-04-01 NOTE — PROGRESS NOTES
Community Care Team Documentation for Patient in [facility]  Subsequent Follow up     Patient remains at [address] ([facility]). See previous [facility] Team notes. Spoke with SNF team.  PT/OT Update: bed mobility CGA; transfers min assist; ambulating 70 feet rolling walker mod assist; upper body ADLS supervision; lower body ADLS max assist; toileting dependent post management and hygiene. LOS/Disposition: 4 weeks. Medications were not reconciled and general patient assessment was not completed during this skilled nursing facility outreach.      Darleen 23, Alyse, [facility]

## 2020-04-06 ENCOUNTER — VIRTUAL VISIT (OUTPATIENT)
Dept: ORTHOPEDIC SURGERY | Age: 85
End: 2020-04-06

## 2020-04-06 DIAGNOSIS — S72.91XD ORTHOPEDIC AFTERCARE FOR HEALING TRAUMATIC FRACTURE OF RIGHT UPPER LEG: ICD-10-CM

## 2020-04-06 DIAGNOSIS — S72.001D ORTHOPEDIC AFTERCARE FOR HEALING TRAUMATIC HIP FRACTURE, RIGHT, CLOSED: Primary | ICD-10-CM

## 2020-04-06 DIAGNOSIS — S72.91XD ORTHOPEDIC AFTERCARE FOR HEALING TRAUMATIC FRACTURE OF RIGHT UPPER LEG: Primary | ICD-10-CM

## 2020-04-06 DIAGNOSIS — S92.412A CLOSED FRACTURE OF PROXIMAL PHALANX OF LEFT GREAT TOE, INITIAL ENCOUNTER: Primary | ICD-10-CM

## 2020-04-06 NOTE — PROGRESS NOTES
is here for a follow up visit from a right hip cephalomedullary nail side. The patient is doing well, with no medical complications since discharge. Pain has been appropriate since surgery,and the patient is progressing well with physical therapy. Telemedicine modality: phone Location of patient: rehab facility Location of physician: office Time spent in consultation:11 min The patient has been made aware of the billing practices of telemedicine. Current Outpatient Medications on File Prior to Visit Medication Sig Dispense Refill  apixaban (ELIQUIS) 2.5 mg tablet Take 1 Tab by mouth two (2) times a day for 30 days. 60 Tab 0  
 hydroCHLOROthiazide (MICROZIDE) 12.5 mg capsule TAKE 1 CAPSULE BY MOUTH ONCE DAILY 90 Cap 3  
 multivit-min-FA-lycopen-lutein (CENTRUM SILVER) 0.4-300-250 mg-mcg-mcg tab Take  by mouth.  simvastatin (ZOCOR) 20 mg tablet TAKE ONE TABLET BY MOUTH NIGHTLY 30 Tab 3  
 levothyroxine (SYNTHROID) 50 mcg tablet TAKE 2 TABLETS BY MOUTH ON SATURDAY AND SUNDAY AND TAKE 1 TABLET BY MOUTH THE OTHER 5 DAYS 40 Tab 11  
 nortriptyline (PAMELOR) 10 mg capsule TAKE 1 TO 2 CAPSULES BY MOUTH AT BEDTIME FOR  SHINGLES  PAIN 60 Cap 5  
 vit C,F-Mr-wxslc-lutein-zeaxan (PRESERVISION AREDS-2) [id number] mg-unit-mg-mg cap capsule Take 1 Cap by mouth two (2) times daily (with meals).  aspirin delayed-release 81 mg tablet Take 1 Tab by mouth daily. 90 Tab 3  polyethylene glycol (MIRALAX) 17 gram/dose powder Take 17 g by mouth two (2) times a day. 1 tablespoon with 8 oz of water daily 510 g 0  
 acetaminophen (TYLENOL EXTRA STRENGTH) 500 mg tablet Take  by mouth every six (6) hours as needed. No current facility-administered medications on file prior to visit. ROS: 
General: denies agitation, major chest pain, unexpected weakness Pain in the hip is managed Skin: healing wound is without issue. Strength: appropriate weakness of involved extremity is resolving since surgery Physical Examination: 
 
There were no vitals taken for this visit. AOX3, NAD Appropriate mood, affect Telemedicine visit Imaging: Postoperative xrays are reviewed, they indicate a well aligned hip fracture with cephalomedullary nail in good position without evidence of loosening, failure, or cutout. Assessment: Status post right hip cephalomedullary nail Plan: 
Patient will continue physical therapy, gait training and hip strength are the goals Wound care and dental prophylaxis instructions were reviewed Weight bearing status: 25% Deep Venous Thrombosis Prophylaxis: lovenox Follow up will be at 3 weeks from now,  Right femur xrays on follow up

## 2020-04-08 ENCOUNTER — PATIENT OUTREACH (OUTPATIENT)
Dept: FAMILY MEDICINE CLINIC | Age: 85
End: 2020-04-08

## 2020-04-08 ENCOUNTER — PATIENT OUTREACH (OUTPATIENT)
Dept: CASE MANAGEMENT | Age: 85
End: 2020-04-08

## 2020-04-08 NOTE — PROGRESS NOTES
Patient remain at SNF [facility]) over the 30 day hospital discharge. Will close episode at this time.

## 2020-04-08 NOTE — PROGRESS NOTES
Community Care Team Documentation for Patient in Loc[facility] within [facility]  Subsequent Follow up     Patient remains at [address] ([facility]). See previous [facility] Team notes. Spoke with SNF team.  PT/OT Update: bed mobility supervision to CGA; transfers CGA to min assist; ambulating 25 feet with rolling walker min assist; allowed to weight bear 25%; upper body ADLS supervision; lower body ADLS mod assist; toileting max assist. LOS/Disposition: 4 weeks. Medications were not reconciled and general patient assessment was not completed during this skilled nursing facility outreach.      Darleen 23, Alyse, [address] Danni Arroyo

## 2020-04-15 ENCOUNTER — PATIENT OUTREACH (OUTPATIENT)
Dept: CASE MANAGEMENT | Age: 85
End: 2020-04-15

## 2020-04-15 NOTE — PROGRESS NOTES
Community Care Team Documentation for Patient in [facility] Subsequent Follow up Patient remains at [address] ([facility]). See previous [facility] Team notes. Spoke with SNF team.  PT/OT update: bed mobility supervision- CGA, transfers CGA; ambulatinog 20 ft with RW and min assist weight being status is gradually increasing as tolerated. UB ADL's supervision: LB ADL's mod assist; toileting max assist.  LOS/Disposition: Projected 4 weeks. Medications were not reconciled and general patient assessment was not completed during this skilled nursing facility outreach. Bam Gamboa RN-CCT 
[phone]

## 2020-04-22 ENCOUNTER — PATIENT OUTREACH (OUTPATIENT)
Dept: CASE MANAGEMENT | Age: 85
End: 2020-04-22

## 2020-04-22 NOTE — PROGRESS NOTES
Community Care Team Documentation for Patient in [facility] Subsequent Follow up Patient remains at [address] ([facility]). See previous [facility] Team notes. Spoke with SNF team. PT/OT update: currently bed mobily and transfers CGA,  ambulating with Rolling Walker and CGA 35-45 ft.  UB ADL's supervision, LB ADL's supervision- min assist, toileting min assist.  LOS/Disposition: projected dischage 3 weeks. Medications were not reconciled and general patient assessment was not completed during this skilled nursing facility outreach. Mechelle Talamantes RN-CCT 
[phone]

## 2020-04-27 ENCOUNTER — VIRTUAL VISIT (OUTPATIENT)
Dept: ORTHOPEDIC SURGERY | Age: 85
End: 2020-04-27

## 2020-04-27 DIAGNOSIS — S72.91XD ORTHOPEDIC AFTERCARE FOR HEALING TRAUMATIC FRACTURE OF RIGHT UPPER LEG: ICD-10-CM

## 2020-04-27 DIAGNOSIS — S92.412A CLOSED FRACTURE OF PROXIMAL PHALANX OF LEFT GREAT TOE, INITIAL ENCOUNTER: Primary | ICD-10-CM

## 2020-04-27 NOTE — PROGRESS NOTES
is here for a follow up visit from a right hip cephalomedullary nail side. The patient is doing well, with no medical complications since discharge. Pain has been appropriate since surgery,and the patient is progressing well with physical therapy. Current Outpatient Medications on File Prior to Visit Medication Sig Dispense Refill  hydroCHLOROthiazide (MICROZIDE) 12.5 mg capsule TAKE 1 CAPSULE BY MOUTH ONCE DAILY 90 Cap 3  
 multivit-min-FA-lycopen-lutein (CENTRUM SILVER) 0.4-300-250 mg-mcg-mcg tab Take  by mouth.  simvastatin (ZOCOR) 20 mg tablet TAKE ONE TABLET BY MOUTH NIGHTLY 30 Tab 3  
 levothyroxine (SYNTHROID) 50 mcg tablet TAKE 2 TABLETS BY MOUTH ON SATURDAY AND SUNDAY AND TAKE 1 TABLET BY MOUTH THE OTHER 5 DAYS 40 Tab 11  
 nortriptyline (PAMELOR) 10 mg capsule TAKE 1 TO 2 CAPSULES BY MOUTH AT BEDTIME FOR  SHINGLES  PAIN 60 Cap 5  
 vit C,O-Wm-hcsbe-lutein-zeaxan (PRESERVISION AREDS-2) 559-078-90-1 mg-unit-mg-mg cap capsule Take 1 Cap by mouth two (2) times daily (with meals).  aspirin delayed-release 81 mg tablet Take 1 Tab by mouth daily. 90 Tab 3  polyethylene glycol (MIRALAX) 17 gram/dose powder Take 17 g by mouth two (2) times a day. 1 tablespoon with 8 oz of water daily 510 g 0  
 acetaminophen (TYLENOL EXTRA STRENGTH) 500 mg tablet Take  by mouth every six (6) hours as needed. No current facility-administered medications on file prior to visit. ROS: 
General: denies agitation, major chest pain, unexpected weakness Pain in the hip is managed well. Skin: healing wound is without issue. Strength: appropriate weakness of involved extremity is resolving since surgery Physical Examination: 
 
There were no vitals taken for this visit. AOX3, NAD Appropriate mood, and affect Imaging: Postoperative xrays are reviewed, they indicate a well aligned hip fracture with cephalomedullary nail in good position without evidence of loosening, failure, or cutout. Callus is noted at the fracture. Assessment: Status post right hip cephalomedullary nail Plan: 
Patient will continue physical therapy, gait training and hip strength are the goals Wound care and dental prophylaxis instructions were reviewed Weight bearing status: as tolerated Deep Venous Thrombosis Prophylaxis: per facility Follow up will be at 4 weeks from now,  Right hip xrays on follow up Telemedicine modality: telephone Location of patient: home Location of physician: office Time spent in consultation:10 minutes The patient has been made aware of the billing practices of telemedicine.

## 2020-04-29 ENCOUNTER — PATIENT OUTREACH (OUTPATIENT)
Dept: CASE MANAGEMENT | Age: 85
End: 2020-04-29

## 2020-04-29 NOTE — PROGRESS NOTES
Community Care Team Documentation for Patient in [facility]  Subsequent Follow up     Patient remains at [address] ([facility]). See previous [facility] Team notes. Spoke with SNF team.  PT/OT Update: bed mobility, supervision; transfers, supervision to CGA; ambulating 60 feet with rolling walker, supervision to CGA; upper body ADLS supervision; lower body ADLS, CGA; toileting min assist. LOS/Disposition: 3 weeks. Medications were not reconciled and general patient assessment was not completed during this skilled nursing facility outreach.      DILCIA Lilly, CCM

## 2020-05-06 ENCOUNTER — PATIENT OUTREACH (OUTPATIENT)
Dept: CASE MANAGEMENT | Age: 85
End: 2020-05-06

## 2020-05-06 NOTE — PROGRESS NOTES
Community Care Team Documentation for Patient in [facility] Subsequent Follow up Patient remains at [address] ([facility]). See previous [facility] Team notes. Spoke with SNF team.  PT/OT update currently: bed mobility supervision,  transfers supervision to CGA,  ambulating 40 ft with RW and supervision, 2 steps with min assist, UB ADL's supervision,  LB ADL's supervisoin- min assist, toileting supervision. LOS/Disposition: Projected discharge on 5/19 to an assisted living facility TBD Medications were not reconciled and general patient assessment was not completed during this skilled nursing facility outreach. Tera Seip RN [facility] 
[phone]

## 2020-05-13 ENCOUNTER — PATIENT OUTREACH (OUTPATIENT)
Dept: FAMILY MEDICINE CLINIC | Age: 85
End: 2020-05-13

## 2020-05-13 NOTE — PROGRESS NOTES
Community Care Team Documentation for Patient in [facility]  Subsequent Follow up     Patient remains at [address] ([facility]). See previous [facility] Team notes. Spoke with SNF team.  PT/OT update: Currently bed mobility supervision; transfers supervision to CGA; ambulating 150 ft with RW and supervision to CGA;  ascend and descend 3 steps with min assist; UB ADL's supervison; LB ADL's supervision to occasional min assist;  toileting occasional min assist; last therapy date 5/25. LOS/Disposition: Plan for discharge on 5/26, TBD shelter (some placement issues)/ vs home. Medications were not reconciled and general patient assessment was not completed during this skilled nursing facility outreach.      Kristy Motley RN-CCT  [phone]

## 2020-05-14 ENCOUNTER — TELEPHONE (OUTPATIENT)
Dept: INTERNAL MEDICINE CLINIC | Facility: CLINIC | Age: 85
End: 2020-05-14

## 2020-05-14 NOTE — TELEPHONE ENCOUNTER
Pt's granddaughter, Tracy Aldrich, called and requested a call back from a nurse today regarding the pt's potential discharge from rehab. They are waiting on information regarding the pt's medication and continued care prior to discharging her later today. Tracy Aldrich requested a call back to discuss her medications and what else needs to be done prior to discharge. Please return call at [phone].

## 2020-05-14 NOTE — TELEPHONE ENCOUNTER
Spoke with Elodia Arango (on HIPPA). Verified patients name and date of birth. Juanpablo states patient is due to be discharged from [facility], but no actual discharge date has been set. Records requested(admission summary and medication list) from [facility]([phone]). Juanpablo is concerned about getting medications filled at discharge as facility will not electronically send refills to pharmacy and patient will have to wait in car while prescriptions are filled. Corby Rodriguez to speak with discharge coordinator Somand see if Juanpablo can get written prescriptions day before discharge. Corby Asp that patient will need ODETTE appt as soon after discharge as possible. Juanpablo also expressed concern that patient wants to go home after discharge instead of going to assisted living so she can get stronger as advised by staff at facility. Juanpablo does not think people in patients home will be able to take the best care of patient as they have no medical training. Corby Rodriguez to speak with a  at facility for guidance.

## 2020-05-19 ENCOUNTER — HOME HEALTH ADMISSION (OUTPATIENT)
Dept: HOME HEALTH SERVICES | Facility: HOME HEALTH | Age: 85
End: 2020-05-19
Payer: MEDICARE

## 2020-05-19 ENCOUNTER — TELEPHONE (OUTPATIENT)
Dept: ORTHOPEDIC SURGERY | Age: 85
End: 2020-05-19

## 2020-05-19 NOTE — TELEPHONE ENCOUNTER
Rcvd call from pt's granddaughter. States pt is being discharged today from rehab. She is concerned  about getting xrays for the upcoming appt. Would like to know what we recommend. I called [facility], and they are unable to obtain images prior to discharge.

## 2020-05-20 ENCOUNTER — PATIENT OUTREACH (OUTPATIENT)
Dept: CASE MANAGEMENT | Age: 85
End: 2020-05-20

## 2020-05-21 ENCOUNTER — HOME CARE VISIT (OUTPATIENT)
Dept: SCHEDULING | Facility: HOME HEALTH | Age: 85
End: 2020-05-21
Payer: MEDICARE

## 2020-05-21 ENCOUNTER — VIRTUAL VISIT (OUTPATIENT)
Dept: INTERNAL MEDICINE CLINIC | Facility: CLINIC | Age: 85
End: 2020-05-21

## 2020-05-21 VITALS — WEIGHT: 126 LBS | HEIGHT: 67 IN | BODY MASS INDEX: 19.78 KG/M2

## 2020-05-21 VITALS
TEMPERATURE: 98 F | DIASTOLIC BLOOD PRESSURE: 80 MMHG | OXYGEN SATURATION: 96 % | HEART RATE: 75 BPM | SYSTOLIC BLOOD PRESSURE: 130 MMHG | RESPIRATION RATE: 16 BRPM

## 2020-05-21 DIAGNOSIS — E03.4 HYPOTHYROIDISM DUE TO ACQUIRED ATROPHY OF THYROID: ICD-10-CM

## 2020-05-21 DIAGNOSIS — E78.00 HYPERCHOLESTEROLEMIA: ICD-10-CM

## 2020-05-21 DIAGNOSIS — K21.9 GERD WITHOUT ESOPHAGITIS: ICD-10-CM

## 2020-05-21 DIAGNOSIS — I12.9 BENIGN HYPERTENSION WITH CHRONIC KIDNEY DISEASE, STAGE IV (HCC): ICD-10-CM

## 2020-05-21 DIAGNOSIS — S72.91XD ORTHOPEDIC AFTERCARE FOR HEALING TRAUMATIC FRACTURE OF RIGHT UPPER LEG: Primary | ICD-10-CM

## 2020-05-21 DIAGNOSIS — K59.01 SLOW TRANSIT CONSTIPATION: ICD-10-CM

## 2020-05-21 DIAGNOSIS — I67.2 CEREBROVASCULAR DISEASE, ARTERIOSCLEROTIC, POST-STROKE: ICD-10-CM

## 2020-05-21 DIAGNOSIS — Z86.73 CEREBROVASCULAR DISEASE, ARTERIOSCLEROTIC, POST-STROKE: ICD-10-CM

## 2020-05-21 DIAGNOSIS — N18.4 BENIGN HYPERTENSION WITH CHRONIC KIDNEY DISEASE, STAGE IV (HCC): ICD-10-CM

## 2020-05-21 DIAGNOSIS — S72.001D CLOSED FRACTURE OF RIGHT HIP WITH ROUTINE HEALING, SUBSEQUENT ENCOUNTER: Primary | ICD-10-CM

## 2020-05-21 PROCEDURE — 3331090002 HH PPS REVENUE DEBIT

## 2020-05-21 PROCEDURE — 400013 HH SOC

## 2020-05-21 PROCEDURE — 3331090001 HH PPS REVENUE CREDIT

## 2020-05-21 PROCEDURE — G0151 HHCP-SERV OF PT,EA 15 MIN: HCPCS

## 2020-05-21 PROCEDURE — G0299 HHS/HOSPICE OF RN EA 15 MIN: HCPCS

## 2020-05-21 RX ORDER — DOCUSATE SODIUM 100 MG/1
CAPSULE, LIQUID FILLED ORAL
COMMUNITY
Start: 2020-05-18 | End: 2020-06-09 | Stop reason: SDUPTHER

## 2020-05-21 RX ORDER — METOPROLOL TARTRATE 25 MG/1
TABLET, FILM COATED ORAL
COMMUNITY
Start: 2020-05-18 | End: 2020-05-21 | Stop reason: SDUPTHER

## 2020-05-21 RX ORDER — OMEPRAZOLE 20 MG/1
20 CAPSULE, DELAYED RELEASE ORAL DAILY
Qty: 90 CAP | Refills: 3 | Status: SHIPPED | OUTPATIENT
Start: 2020-05-21 | End: 2020-07-04 | Stop reason: SDUPTHER

## 2020-05-21 RX ORDER — APIXABAN 2.5 MG/1
TABLET, FILM COATED ORAL
COMMUNITY
Start: 2020-05-19 | End: 2020-06-19

## 2020-05-21 RX ORDER — LEVOTHYROXINE SODIUM 50 UG/1
TABLET ORAL
Qty: 117 TAB | Refills: 3 | Status: SHIPPED | OUTPATIENT
Start: 2020-05-21 | End: 2020-07-04 | Stop reason: SDUPTHER

## 2020-05-21 RX ORDER — METOPROLOL TARTRATE 25 MG/1
TABLET, FILM COATED ORAL
Qty: 45 TAB | Refills: 3 | Status: SHIPPED | OUTPATIENT
Start: 2020-05-21 | End: 2020-07-04 | Stop reason: SDUPTHER

## 2020-05-21 RX ORDER — ALUMINUM ZIRCONIUM TRICHLOROHYDREX GLY 0.19 G/G
STICK TOPICAL
COMMUNITY
Start: 2020-05-18 | End: 2020-05-21 | Stop reason: SDUPTHER

## 2020-05-21 RX ORDER — POLYETHYLENE GLYCOL 3350 17 G/17G
POWDER, FOR SOLUTION ORAL
Qty: 510 G | Refills: 0
Start: 2020-05-21 | End: 2020-07-04 | Stop reason: SDUPTHER

## 2020-05-21 RX ORDER — DICLOFENAC SODIUM 10 MG/G
GEL TOPICAL 4 TIMES DAILY
COMMUNITY
End: 2020-05-21

## 2020-05-21 NOTE — PROGRESS NOTES
Zulema Decker is a 80 y.o. female who was seen by synchronous (real-time) audio-video technology on 5/21/2020. Consent: Zulema Decker, who was seen by synchronous (real-time) audio-video technology, and/or her healthcare decision maker, is aware that this patient-initiated, Telehealth encounter on 5/21/2020 is a billable service, with coverage as determined by her insurance carrier. She is aware that she may receive a bill and has provided verbal consent to proceed: {YES/NO/NA-Consent obtained within past 12 months:54044}. Assessment & Plan:  
{A/P PLUS DISPO TBSB:18035} {Total time-based coding - will disappear if no selections made (Optional):00288::\"I spent at least 23 minutes on this visit with this established patient. (47460)\":0} Subjective: Zulema Decker is a 80 y.o. female who was seen for No chief complaint on file. Ms. Johnnie Sweeney is a 8 y.o. year old female, she is seen today for Transition of Care services following a hospital discharge for acute comminuted intertrochanteric fracture right femur s/p  IM nailing after a  
 fall , acute postoperative blood loss anemia on March 9. She was discharged to [facility]  and left there on. May 19. Our office Nurse Navigator performed an outreach to Ms. Tiera Senior numerous times between March 13 and May 13 (within 2 business days of discharge) to complete medication reconciliation and a telephonic assessment of her condition. She has hypothyroidism, hypertension, CKD, hyperlipidemia, peripheral vascular disease and history of prior stroke. She presented to the ED on March 4 having fallen at home. She was taking her dishes from the den to the kitchen when she suddenly fell. She typically uses a cane or walker to assist with ambulation but was not using it the time of her fall. She sustained an acute comminuted intertrochanteric fracture right femur.  She underwent  IM nailing with  Ema Harvey. Last PT/OT update on May 13: Currently bed mobility supervision; [facility]; ambulating 150 ft with RW and supervision to CGA;  ascend and descend 3 steps with min assist; UB ADL's supervison; LB ADL's supervision to occasional min assist;  toileting occasional min assist.  
 
She had PT evaluation and going to have therapy twice a week. She was sent home with Eliquis 2.5 mg twice a day. It would cost her Diarrhea, appetite poor sometimes Says gets gas from [employer], but has some at home. She presents for follow up of hypertension, CKD, hyperlipidemia, peripheral vascular disease and history of prior stroke. Medication compliance: compliant all of the time Medication side effects: none Home BP Monitoring: BP  130/60 P-78  T -96 Cardiovascular ROS: 
She complains of dyspnea on exertion, dizziness. She denies palpitations, exertional chest pressure/discomfort, lower extremity edema She presents for follow up of hypothyroidism. Symptoms include weight changes, cold intolerance, Patient denies change in energy level   Course to date has been stable. Review of Systems Constitutional: Negative for malaise/fatigue and weight loss. Gastrointestinal: Positive for diarrhea (Miralax was stopped and Colace reduced to once a day on advice of [address]. ) and heartburn. Negative for constipation. Musculoskeletal: Positive for joint pain. Negative for back pain. Neurological: Positive for dizziness. Negative for tingling and focal weakness. Objective:  
Vital Signs: (As obtained by patient/caregiver at home) There were no vitals taken for this visit. [INSTRUCTIONS:  \"[x]\" Indicates a positive item  \"[]\" Indicates a negative item  -- DELETE ALL ITEMS NOT EXAMINED] Constitutional: [x] Appears well-developed and well-nourished [x] No apparent distress   
  [] Abnormal - Mental status: [x] Alert and awake  [x] Oriented to person/place/time [x] Able to follow commands   
[] Abnormal - Eyes:   EOM    [x]  Normal    [] Abnormal -  
Sclera  [x]  Normal    [] Abnormal - 
        Discharge [x]  None visible   [] Abnormal - HENT: [x] Normocephalic, atraumatic  [] Abnormal -  
[x] Mouth/Throat: Mucous membranes are moist 
 
External Ears [x] Normal  [] Abnormal - Neck: [x] No visualized mass [] Abnormal - Pulmonary/Chest: [x] Respiratory effort normal   [x] No visualized signs of difficulty breathing or respiratory distress 
      [] Abnormal - Musculoskeletal:   [x] Normal gait with no signs of ataxia [x] Normal range of motion of neck [] Abnormal -  
 
Neurological:        [x] No Facial Asymmetry (Cranial nerve 7 motor function) (limited exam due to video visit) [x] No gaze palsy  
     [] Abnormal -   
     
Skin:        [x] No significant exanthematous lesions or discoloration noted on facial skin   
     [] Abnormal - Psychiatric:       [x] Normal Affect [] Abnormal -  
     [x] No Hallucinations Other pertinent observable physical exam findings:- 
 
  
Lab Results Component Value Date/Time TSH 6.800 (H) 10/17/2019 12:07 PM  
 T4, Free 1.22 10/17/2019 12:07 PM  
 
Lab Results Component Value Date/Time Cholesterol, total 153 01/26/2018 01:34 PM  
 HDL Cholesterol 89 01/26/2018 01:34 PM  
 LDL,Direct 55 06/18/2015 03:43 PM  
 LDL, calculated 56 01/26/2018 01:34 PM  
 VLDL, calculated 8 01/26/2018 01:34 PM  
 Triglyceride 41 01/26/2018 01:34 PM  
 CHOL/HDL Ratio 2.1 11/10/2010 09:50 AM  
 
Lab Results Component Value Date/Time  Sodium 137 03/08/2020 03:42 AM  
 Potassium 4.1 03/08/2020 03:42 AM  
 Chloride 105 03/08/2020 03:42 AM  
 CO2 29 03/08/2020 03:42 AM  
 Anion gap 3 (L) 03/08/2020 03:42 AM  
 Glucose 97 03/08/2020 03:42 AM  
 BUN 32 (H) 03/08/2020 03:42 AM  
 Creatinine 1.22 (H) 03/08/2020 03:42 AM  
 BUN/Creatinine ratio 26 (H) 03/08/2020 03:42 AM  
 GFR est AA 48 (L) 03/08/2020 03:42 AM  
 GFR est non-AA 40 (L) 03/08/2020 03:42 AM  
 Calcium 8.5 03/08/2020 03:42 AM  
 Bilirubin, total 0.9 03/06/2020 03:13 AM  
 AST (SGOT) 21 03/06/2020 03:13 AM  
 Alk. phosphatase 70 03/06/2020 03:13 AM  
 Protein, total 5.4 (L) 03/06/2020 03:13 AM  
 Albumin 2.4 (L) 03/06/2020 03:13 AM  
 Globulin 3.0 03/06/2020 03:13 AM  
 A-G Ratio 0.8 (L) 03/06/2020 03:13 AM  
 ALT (SGPT) 15 03/06/2020 03:13 AM  
 
 
 
We discussed the expected course, resolution and complications of the diagnosis(es) in detail. Medication risks, benefits, costs, interactions, and alternatives were discussed as indicated. I advised her to contact the office if her condition worsens, changes or fails to improve as anticipated. She expressed understanding with the diagnosis(es) and plan. Jenna Styles is a 80 y.o. female who was evaluated by a video visit encounter for concerns as above. Patient identification was verified prior to start of the visit. A caregiver was present when appropriate. Due to this being a TeleHealth encounter (During [facility] public health emergency), evaluation of the following organ systems was limited: Vitals/Constitutional/EENT/Resp/CV/GI//MS/Neuro/Skin/Heme-Lymph-Imm. Pursuant to the emergency declaration under the [facility] Critical access [facility] waiver authority and the [employer] Act, this Virtual  Visit was conducted, with patient's (and/or legal guardian's) consent, to reduce the patient's risk of exposure to COVID-19 and provide necessary medical care. Services were provided through a video synchronous discussion virtually to substitute for in-person clinic visit. Patient and provider were located at their individual homes.  
 
 
Wilbert Scott MD

## 2020-05-21 NOTE — PROGRESS NOTES
Community Care Team Documentation for Patient in [facility]  Discharge Note    Patient has been discharged from [address] ([facility]). See previous [facility] Team notes. PCP : Monica Olguin MD    Spoke with SNF team.  LOS/Disposition:  Patient discharged to home on 5/19 with [facility]. Community Care Team will sign off at this time. Medications were not reconciled and general patient assessment was not completed during this skilled nursing facility outreach.      Bam Gamboa- ISHAN  CCT

## 2020-05-21 NOTE — PROGRESS NOTES
Yael Bearden is a 80 y.o. female evaluated via audio only technology on 5/21/2020. Consent: She and/or her health care decision maker is aware that she may receive a bill for this audio only encounter, depending on her insurance coverage, and has provided verbal consent to proceed: Yes I communicated with the patient and/or health care decision maker about the nature and details of the following: 
Assessment & Plan:  
Diagnoses and all orders for this visit: 1. Closed fracture of right hip with routine healing, subsequent encounter Healing as planned. Followed by Dr Janel Prajapati 2. Benign hypertension with chronic kidney disease, stage IV ([facility] [address] [address] Hypertension is controlled and creat is stable. -     metoprolol tartrate (LOPRESSOR) 25 mg tablet; TAKE ONE HALF TABLET BY MOUTH ONCE DAILY FOR HYPERTENSION 
-     METABOLIC PANEL, BASIC; Future -     CBC WITH AUTOMATED DIFF; Future 3. Hypercholesterolemia Hyperlipidemia is controlled 4. Hypothyroidism due to acquired atrophy of thyroid Euthyroid on replacement. -     levothyroxine (SYNTHROID) 50 mcg tablet; TAKE 2 TABLETS BY MOUTH ON SATURDAY AND SUNDAY AND TAKE 1 TABLET THE OTHER 5 DAYS 
-     TSH AND FREE T4; Future 5. Cerebrovascular disease, arteriosclerotic, post-stroke Stable taking antiplatelet agen 6. GERD without esophagitis Symptoms are adequately controlled. -     omeprazole (PRILOSEC) 20 mg capsule; Take 1 Cap by mouth daily. 7. Slow transit constipation -     polyethylene glycol (Miralax) 17 gram/dose powder; 1 tablespoon with 8 oz of water daily as needed for constipation 
 
:  
Follow up in 4 months with for HTN, CKD, thyroid 12 
Subjective: Yael Bearden is a 80 y.o. female who was seen for Hospital Follow Up (Broken Hip // [address] [address] [facility] then [facility] and [address] [place] [address]) 
her granddaughter Tracy Aldrich assisted with this call.   
 
Ms. Hakeem Murray is a 8 y.o. year old female, she is seen today for Transition of Care services following a hospital discharge for acute comminuted intertrochanteric fracture right femur s/p  IM nailing after a  
 fall , acute postoperative blood loss anemia on March 9. She was discharged to [facility]  and left there on. May 19. Our office Nurse Navigator performed an outreach to Ms. Tita Galeas numerous times between March 13 and May 13 (within 2 business days of discharge) to complete medication reconciliation and a telephonic assessment of her condition. She has hypothyroidism, hypertension, CKD, hyperlipidemia, peripheral vascular disease and history of prior stroke. She presented to the ED on March 4 having fallen at home. She was taking her dishes from the den to the kitchen when she suddenly fell. She typically uses a cane or walker to assist with ambulation but was not using it the time of her fall. She sustained an acute comminuted intertrochanteric fracture right femur. She underwent  IM nailing with Dr Dominga Jenkins. Last PT/OT update on May 13: Currently bed mobility supervision; transfers supervision to [facility]; ambulating 150 ft with RW and supervision to CGA;  ascend and descend 3 steps with min assist; UB ADL's supervison; LB ADL's supervision to occasional min assist;  toileting occasional min assist.  
 
She had PT evaluation and going to have therapy twice a week. She was sent home with Eliquis 2.5 mg twice a day. It would cost her Diarrhea, appetite poor sometimes Says gets gas from Ensure, but has some at home. She presents for follow up of hypertension, CKD, hyperlipidemia, peripheral vascular disease and history of prior stroke. Medication compliance: compliant all of the time Medication side effects: none Home BP Monitoring: BP  130/60 P-78  T -96 Cardiovascular ROS: 
She complains of dyspnea on exertion, dizziness. She denies palpitations, exertional chest pressure/discomfort, lower extremity edema She presents for follow up of hypothyroidism. Symptoms include weight changes, cold intolerance, Patient denies change in energy level   Course to date has been stable. Patient Active Problem List  
Diagnosis Code  Postherpetic neuralgia B02.29  
 Cerebrovascular disease, arteriosclerotic, post-stroke I67.2, Z86.73  
 Asymptomatic PVD (peripheral vascular disease) ([facility]) I73.9  Hypercholesterolemia E78.00  Palpitations R00.2  Benign hypertension with chronic kidney disease, stage IV ([facility]) I12.9, N18.4  Hypothyroidism due to acquired atrophy of thyroid E03.4  Closed fracture of proximal phalanx of left great toe, initial encounter R32.248I  Closed right hip fracture ([facility] [address] S72.001A Moraima Her Orthopedic aftercare for healing traumatic fracture of right upper leg S72. 91XD Past Medical History:  
Diagnosis Date  Chronic kidney disease  CKD (chronic kidney disease) stage 3, GFR 30-59 ml/min ([facility]) 5/17/2010  Hypercholesteremia  Hypertension  Post herpetic neuralgia  Stroke ([facility]  Thyroid disease Past Surgical History:  
Procedure Laterality Date  HX APPENDECTOMY  HX HYSTERECTOMY  1947  
 one ovary out Social History Socioeconomic History  Marital status:  Spouse name: Not on file  Number of children: Not on file  Years of education: Not on file  Highest education level: Not on file Tobacco Use  Smoking status: Never Smoker  Smokeless tobacco: Never Used Substance and Sexual Activity  Alcohol use: No  
 Drug use: No  
 
Family History Problem Relation Age of Onset  Cancer Father Allergies Allergen Reactions  Lyrica [Pregabalin] Other (comments) Couldn't function  Macrobid [Nitrofurantoin Monohyd/M-Cryst] Other (comments)  
  sick  Neurontin [Gabapentin] Other (comments) groggy Current Outpatient Medications Medication Sig Dispense Refill  Eliquis 2.5 mg tablet TAKE 1 TABLET BY MOUTH TWICE DAILY FOR ANTICOAGULANT   mg capsule TAKE 1 CAPSULE BY MOUTH TWICE DAILY (MORNING AND BEDTIME) FOR BOWEL MANAGEMENT  levothyroxine (SYNTHROID) 50 mcg tablet TAKE 2 TABLETS BY MOUTH ON SATURDAY AND SUNDAY AND TAKE 1 TABLET THE OTHER 5 DAYS 117 Tab 3  polyethylene glycol (Miralax) 17 gram/dose powder 1 tablespoon with 8 oz of water daily as needed for constipation 510 g 0  
 metoprolol tartrate (LOPRESSOR) 25 mg tablet TAKE ONE HALF TABLET BY MOUTH ONCE DAILY FOR HYPERTENSION 45 Tab 3  
 omeprazole (PRILOSEC) 20 mg capsule Take 1 Cap by mouth daily. 90 Cap 3  
 simvastatin (ZOCOR) 20 mg tablet TAKE ONE TABLET BY MOUTH NIGHTLY 30 Tab 3  
 nortriptyline (PAMELOR) 10 mg capsule TAKE 1 TO 2 CAPSULES BY MOUTH AT BEDTIME FOR  SHINGLES  PAIN 60 Cap 5  
 vit C,R-Jn-jnnyc-lutein-zeaxan (PRESERVISION AREDS-2) 587-313-25-1 mg-unit-mg-mg cap capsule Take 1 Cap by mouth two (2) times daily (with meals).  aspirin delayed-release 81 mg tablet Take 1 Tab by mouth daily. 90 Tab 3  
 acetaminophen (TYLENOL EXTRA STRENGTH) 500 mg tablet Take  by mouth every six (6) hours as needed. ROS I affirm this is a Patient-Initiated Episode with a Patient who has not had a related appointment within my department in the past 7 days or scheduled within the next 24 hours. Total Time: minutes: 21-30 minutes Note: not billable if this call serves to triage the patient into an appointment for the relevant concern Jack Valera MD

## 2020-05-21 NOTE — TELEPHONE ENCOUNTER
Sent order to [facility]. Called [facility] to advise they would be calling to set up imaging.

## 2020-05-21 NOTE — PROGRESS NOTES
Etelvina Laguna  Identified pt with two pt identifiers(name and ). Chief Complaint Patient presents with  
[facility] Follow Up Broken Hip // ED [facility] then [facility] Reviewed record In preparation for visit and have obtained necessary documentation. 1. Have you been to the ER, urgent care clinic or hospitalized since your last visit? Yes. ED [facility] and [facility] 2. Have you seen or consulted any other health care providers outside of the [address] [place] [address] since your last visit? Include any pap smears or colon screening. No 
 
Patient has an advance directive and it is on file. Vitals reviewed with provider. Health Maintenance reviewed:  
 
Health Maintenance Due Topic  Shingrix Vaccine Age 50> (1 of 2)  Lipid Screen Wt Readings from Last 3 Encounters:  
20 126 lb (57.2 kg) 20 136 lb 8 oz (61.9 kg) 20 121 lb (54.9 kg) Temp Readings from Last 3 Encounters:  
20 98.2 °F (36.8 °C)  
20 97.5 °F (36.4 °C) (Oral) 20 97.5 °F (36.4 °C) (Oral) BP Readings from Last 3 Encounters:  
20 160/72  
20 158/64  
20 131/53 Pulse Readings from Last 3 Encounters:  
20 82  
20 (!) 50  
20 77 Vitals:  
 20 1344 Weight: 126 lb (57.2 kg) Height: 5' 7\" (1.702 m) PainSc:   0 - No pain Learning Assessment:  
:  
 
 
Learning Assessment 2017 PRIMARY LEARNER Patient Patient Patient HIGHEST LEVEL OF EDUCATION - PRIMARY LEARNER  - DID NOT GRADUATE HIGH SCHOOL DID NOT GRADUATE HIGH SCHOOL  
BARRIERS PRIMARY LEARNER HEARING HEARING HEARING  
[address] CAREGIVER - Yes Yes [address] [facility] William/Brenden Thomas/Jamin -  
CO-LEARNER HIGHEST LEVEL OF EDUCATION - [facility] PRIMARY LANGUAGE ENGLISH ENGLISH ENGLISH  
 PRIMARY LANGUAGE [facility]  NEED - - No  
LEARNER PREFERENCE PRIMARY LISTENING LISTENING LISTENING  
LEARNER PREFERENCE CO-LEARNER - - OTHER (COMMENT) LEARNING SPECIAL TOPICS - - NA  
ANSWERED BY Patient Patient patient RELATIONSHIP SELF SELF SELF Depression Screening:  
:  
 
 
3 most recent PHQ Screens 2/27/2020 Little interest or pleasure in doing things Not at all Feeling down, depressed, irritable, or hopeless Not at all Total Score PHQ 2 0 Trouble falling or staying asleep, or sleeping too much - Feeling tired or having little energy - Poor appetite, weight loss, or overeating - Feeling bad about yourself - or that you are a failure or have let yourself or your family down - Trouble concentrating on things such as school, work, reading, or watching TV - Moving or speaking so slowly that other people could have noticed; or the opposite being so fidgety that others notice - Thoughts of being better off dead, or hurting yourself in some way -  
PHQ 9 Score - How difficult have these problems made it for you to do your work, take care of your home and get along with others - Fall Risk Assessment:  
:  
 
 
Fall Risk Assessment, last 12 mths 2/27/2020 Able to walk? No  
Fall in past 12 months? -  
Fall with injury? -  
Number of falls in past 12 months - Fall Risk Score -  
  
 
Abuse Screening:  
:  
 
 
Abuse Screening Questionnaire 10/17/2019 7/13/2018 8/4/2017 8/30/2016 6/18/2015 6/18/2014 Do you ever feel afraid of your partner? N N N N N N Are you in a relationship with someone who physically or mentally threatens you? N N N N N N Is it safe for you to go home? Y Y Y Y Y Y  
  
 
ADL Screening:  
:  
 
 
ADL Assessment 12/1/2014 Feeding yourself No Help Needed Getting from bed to chair No Help Needed Getting dressed No Help Needed Bathing or showering No Help Needed Walk across the room (includes cane/walker) No Help Needed Using the telphone No Help Needed Taking your medications Help Needed Preparing meals No Help Needed Managing money (expenses/bills) No Help Needed Moderately strenuous housework (laundry) No Help Needed Shopping for personal items (toiletries/medicines) Help Needed Shopping for groceries Help Needed Driving Help Needed Climbing a flight of stairs No Help Needed Getting to places beyond walking distances No Help Needed

## 2020-05-22 VITALS
HEART RATE: 80 BPM | SYSTOLIC BLOOD PRESSURE: 144 MMHG | OXYGEN SATURATION: 94 % | DIASTOLIC BLOOD PRESSURE: 70 MMHG | RESPIRATION RATE: 16 BRPM | TEMPERATURE: 97.8 F

## 2020-05-22 PROCEDURE — 3331090001 HH PPS REVENUE CREDIT

## 2020-05-22 PROCEDURE — 3331090002 HH PPS REVENUE DEBIT

## 2020-05-23 PROCEDURE — 3331090001 HH PPS REVENUE CREDIT

## 2020-05-23 PROCEDURE — 3331090002 HH PPS REVENUE DEBIT

## 2020-05-24 PROCEDURE — 3331090001 HH PPS REVENUE CREDIT

## 2020-05-24 PROCEDURE — 3331090002 HH PPS REVENUE DEBIT

## 2020-05-25 PROCEDURE — 3331090002 HH PPS REVENUE DEBIT

## 2020-05-25 PROCEDURE — 3331090001 HH PPS REVENUE CREDIT

## 2020-05-26 ENCOUNTER — TELEPHONE (OUTPATIENT)
Dept: INTERNAL MEDICINE CLINIC | Facility: CLINIC | Age: 85
End: 2020-05-26

## 2020-05-26 ENCOUNTER — HOME CARE VISIT (OUTPATIENT)
Dept: SCHEDULING | Facility: HOME HEALTH | Age: 85
End: 2020-05-26
Payer: MEDICARE

## 2020-05-26 VITALS
DIASTOLIC BLOOD PRESSURE: 62 MMHG | HEART RATE: 82 BPM | OXYGEN SATURATION: 98 % | TEMPERATURE: 97.8 F | SYSTOLIC BLOOD PRESSURE: 136 MMHG

## 2020-05-26 PROCEDURE — 3331090001 HH PPS REVENUE CREDIT

## 2020-05-26 PROCEDURE — G0151 HHCP-SERV OF PT,EA 15 MIN: HCPCS

## 2020-05-26 PROCEDURE — 3331090002 HH PPS REVENUE DEBIT

## 2020-05-26 NOTE — TELEPHONE ENCOUNTER
Pt's granddaughter, Cara Max, called and requested a call back about \"a problem with one of her medicines\" that she would like to change. Looking to speak tot he provider prior to changing the meds, caller would give no additional information. Please return call at [phone].

## 2020-05-26 NOTE — TELEPHONE ENCOUNTER
Cadence Persaud given instructions to stop Colace and see how it goes. She has no further questions at this time.

## 2020-05-26 NOTE — TELEPHONE ENCOUNTER
Verified patients name and date of birth. Spoke with [address] [facility]). Dat Reed states patient is currently taking colace 100 mg daily and having loose stools and does not know she needs to have a BM until its too late. Family wants to know if patient can cut back on strength. Please advise.

## 2020-05-27 ENCOUNTER — VIRTUAL VISIT (OUTPATIENT)
Dept: ORTHOPEDIC SURGERY | Age: 85
End: 2020-05-27

## 2020-05-27 DIAGNOSIS — S72.91XD ORTHOPEDIC AFTERCARE FOR HEALING TRAUMATIC FRACTURE OF RIGHT UPPER LEG: Primary | ICD-10-CM

## 2020-05-27 PROCEDURE — 3331090002 HH PPS REVENUE DEBIT

## 2020-05-27 PROCEDURE — 3331090001 HH PPS REVENUE CREDIT

## 2020-05-27 NOTE — PROGRESS NOTES
is here for a follow up visit from a right hip cephalomedullary nail side. The patient is doing well, with no medical complications since discharge. Pain has been appropriate since surgery,and the patient is progressing well with physical therapy. Current Outpatient Medications on File Prior to Visit Medication Sig Dispense Refill  Eliquis 2.5 mg tablet TAKE 1 TABLET BY MOUTH TWICE DAILY FOR ANTICOAGULANT   mg capsule TAKE 1 CAPSULE BY MOUTH TWICE DAILY (MORNING AND BEDTIME) FOR BOWEL MANAGEMENT  levothyroxine (SYNTHROID) 50 mcg tablet TAKE 2 TABLETS BY MOUTH ON SATURDAY AND SUNDAY AND TAKE 1 TABLET THE OTHER 5 DAYS 117 Tab 3  polyethylene glycol (Miralax) 17 gram/dose powder 1 tablespoon with 8 oz of water daily as needed for constipation 510 g 0  
 metoprolol tartrate (LOPRESSOR) 25 mg tablet TAKE ONE HALF TABLET BY MOUTH ONCE DAILY FOR HYPERTENSION 45 Tab 3  
 omeprazole (PRILOSEC) 20 mg capsule Take 1 Cap by mouth daily. 90 Cap 3  
 metoprolol tartrate (LOPRESSOR) 25 mg tablet Take 0.5 Tabs by mouth daily.  Omeprazole delayed release (PRILOSEC D/R) 20 mg tablet Take 1 Tab by mouth daily.  docusate sodium (COLACE) 100 mg capsule Take 1 Tab by mouth two (2) times a day.  nortriptyline (PAMELOR) 10 mg capsule Take 1 Tab by mouth nightly.  apixaban (Eliquis) 2.5 mg tablet Take 1 Tab by mouth two (2) times a day.  simvastatin (ZOCOR) 20 mg tablet TAKE ONE TABLET BY MOUTH NIGHTLY 30 Tab 3  
 nortriptyline (PAMELOR) 10 mg capsule TAKE 1 TO 2 CAPSULES BY MOUTH AT BEDTIME FOR  SHINGLES  PAIN 60 Cap 5  
 vit C,F-Gp-alnky-lutein-zeaxan (PRESERVISION AREDS-2) [id number] mg-unit-mg-mg cap capsule Take 1 Cap by mouth two (2) times daily (with meals).  aspirin delayed-release 81 mg tablet Take 1 Tab by mouth daily. 90 Tab 3  
 acetaminophen (TYLENOL EXTRA STRENGTH) 500 mg tablet Take  by mouth every six (6) hours as needed. No current facility-administered medications on file prior to visit. ROS: 
General: denies agitation, major chest pain, unexpected weakness Pain in the hip is managed with tyleno. Skin: healing wound is without issue. Strength: appropriate weakness of involved extremity is resolving since surgery Physical Examination: 
 
There were no vitals taken for this visit. AOX3, NAD Mood, affect appropriate Imaging: Postoperative xrays are reviewed, they indicate a well aligned hip fracture with cephalomedullary nail in good position without evidence of loosening, failure, or cutout. Assessment: Status post right hip cephalomedullary nail Plan: 
Patient will continue physical therapy, gait training and hip strength are the goals Wound care and dental prophylaxis instructions were reviewed Weight bearing status: as tolerated Deep Venous Thrombosis Prophylaxis: none Follow up will be at 4 weeks from now,  Right hip xrays on follow up Telemedicine modality: telephone Location of patient: home Location of physician: office Time spent in consultation:12 minutes The patient has been made aware of the billing practices of telemedicine.

## 2020-05-28 ENCOUNTER — HOME CARE VISIT (OUTPATIENT)
Dept: SCHEDULING | Facility: HOME HEALTH | Age: 85
End: 2020-05-28
Payer: MEDICARE

## 2020-05-28 PROCEDURE — G0151 HHCP-SERV OF PT,EA 15 MIN: HCPCS

## 2020-05-28 PROCEDURE — 3331090002 HH PPS REVENUE DEBIT

## 2020-05-28 PROCEDURE — 3331090001 HH PPS REVENUE CREDIT

## 2020-05-29 ENCOUNTER — HOME CARE VISIT (OUTPATIENT)
Dept: HOME HEALTH SERVICES | Facility: HOME HEALTH | Age: 85
End: 2020-05-29
Payer: MEDICARE

## 2020-05-29 ENCOUNTER — HOME CARE VISIT (OUTPATIENT)
Dept: SCHEDULING | Facility: HOME HEALTH | Age: 85
End: 2020-05-29
Payer: MEDICARE

## 2020-05-29 VITALS
TEMPERATURE: 96.8 F | SYSTOLIC BLOOD PRESSURE: 128 MMHG | HEART RATE: 82 BPM | OXYGEN SATURATION: 97 % | DIASTOLIC BLOOD PRESSURE: 72 MMHG

## 2020-05-29 PROCEDURE — 3331090001 HH PPS REVENUE CREDIT

## 2020-05-29 PROCEDURE — 3331090002 HH PPS REVENUE DEBIT

## 2020-05-29 PROCEDURE — G0152 HHCP-SERV OF OT,EA 15 MIN: HCPCS

## 2020-05-30 VITALS
DIASTOLIC BLOOD PRESSURE: 78 MMHG | OXYGEN SATURATION: 97 % | TEMPERATURE: 99.3 F | SYSTOLIC BLOOD PRESSURE: 120 MMHG | HEART RATE: 87 BPM

## 2020-05-30 PROCEDURE — 3331090002 HH PPS REVENUE DEBIT

## 2020-05-30 PROCEDURE — 3331090001 HH PPS REVENUE CREDIT

## 2020-05-31 PROCEDURE — 3331090002 HH PPS REVENUE DEBIT

## 2020-05-31 PROCEDURE — 3331090001 HH PPS REVENUE CREDIT

## 2020-06-01 PROCEDURE — 3331090002 HH PPS REVENUE DEBIT

## 2020-06-01 PROCEDURE — 3331090001 HH PPS REVENUE CREDIT

## 2020-06-02 ENCOUNTER — HOME CARE VISIT (OUTPATIENT)
Dept: HOME HEALTH SERVICES | Facility: HOME HEALTH | Age: 85
End: 2020-06-02
Payer: MEDICARE

## 2020-06-02 ENCOUNTER — HOME CARE VISIT (OUTPATIENT)
Dept: SCHEDULING | Facility: HOME HEALTH | Age: 85
End: 2020-06-02
Payer: MEDICARE

## 2020-06-02 PROCEDURE — G0300 HHS/HOSPICE OF LPN EA 15 MIN: HCPCS

## 2020-06-02 PROCEDURE — G0151 HHCP-SERV OF PT,EA 15 MIN: HCPCS

## 2020-06-02 PROCEDURE — 3331090002 HH PPS REVENUE DEBIT

## 2020-06-02 PROCEDURE — G0152 HHCP-SERV OF OT,EA 15 MIN: HCPCS

## 2020-06-02 PROCEDURE — 3331090001 HH PPS REVENUE CREDIT

## 2020-06-03 VITALS
SYSTOLIC BLOOD PRESSURE: 122 MMHG | RESPIRATION RATE: 18 BRPM | OXYGEN SATURATION: 94 % | HEART RATE: 76 BPM | TEMPERATURE: 97.2 F | DIASTOLIC BLOOD PRESSURE: 70 MMHG

## 2020-06-03 PROCEDURE — 3331090001 HH PPS REVENUE CREDIT

## 2020-06-03 PROCEDURE — 3331090002 HH PPS REVENUE DEBIT

## 2020-06-04 ENCOUNTER — HOME CARE VISIT (OUTPATIENT)
Dept: SCHEDULING | Facility: HOME HEALTH | Age: 85
End: 2020-06-04
Payer: MEDICARE

## 2020-06-04 VITALS
TEMPERATURE: 98.1 F | SYSTOLIC BLOOD PRESSURE: 122 MMHG | HEART RATE: 84 BPM | DIASTOLIC BLOOD PRESSURE: 74 MMHG | OXYGEN SATURATION: 95 %

## 2020-06-04 VITALS
DIASTOLIC BLOOD PRESSURE: 78 MMHG | RESPIRATION RATE: 18 BRPM | OXYGEN SATURATION: 91 % | SYSTOLIC BLOOD PRESSURE: 122 MMHG | TEMPERATURE: 97 F | HEART RATE: 74 BPM

## 2020-06-04 PROCEDURE — G0152 HHCP-SERV OF OT,EA 15 MIN: HCPCS

## 2020-06-04 PROCEDURE — 3331090001 HH PPS REVENUE CREDIT

## 2020-06-04 PROCEDURE — 3331090002 HH PPS REVENUE DEBIT

## 2020-06-04 PROCEDURE — G0151 HHCP-SERV OF PT,EA 15 MIN: HCPCS

## 2020-06-05 VITALS — TEMPERATURE: 98.2 F | SYSTOLIC BLOOD PRESSURE: 128 MMHG | HEART RATE: 82 BPM | DIASTOLIC BLOOD PRESSURE: 72 MMHG

## 2020-06-05 PROCEDURE — 3331090001 HH PPS REVENUE CREDIT

## 2020-06-05 PROCEDURE — 3331090002 HH PPS REVENUE DEBIT

## 2020-06-06 PROCEDURE — 3331090002 HH PPS REVENUE DEBIT

## 2020-06-06 PROCEDURE — 3331090001 HH PPS REVENUE CREDIT

## 2020-06-07 PROCEDURE — 3331090001 HH PPS REVENUE CREDIT

## 2020-06-07 PROCEDURE — 3331090002 HH PPS REVENUE DEBIT

## 2020-06-08 ENCOUNTER — HOME CARE VISIT (OUTPATIENT)
Dept: SCHEDULING | Facility: HOME HEALTH | Age: 85
End: 2020-06-08
Payer: MEDICARE

## 2020-06-08 VITALS
DIASTOLIC BLOOD PRESSURE: 68 MMHG | OXYGEN SATURATION: 96 % | HEART RATE: 78 BPM | SYSTOLIC BLOOD PRESSURE: 118 MMHG | TEMPERATURE: 97.3 F | RESPIRATION RATE: 18 BRPM

## 2020-06-08 PROCEDURE — G0152 HHCP-SERV OF OT,EA 15 MIN: HCPCS

## 2020-06-08 PROCEDURE — 3331090001 HH PPS REVENUE CREDIT

## 2020-06-08 PROCEDURE — 3331090002 HH PPS REVENUE DEBIT

## 2020-06-09 ENCOUNTER — HOME CARE VISIT (OUTPATIENT)
Dept: SCHEDULING | Facility: HOME HEALTH | Age: 85
End: 2020-06-09
Payer: MEDICARE

## 2020-06-09 VITALS
HEART RATE: 70 BPM | TEMPERATURE: 97.8 F | SYSTOLIC BLOOD PRESSURE: 122 MMHG | OXYGEN SATURATION: 94 % | DIASTOLIC BLOOD PRESSURE: 68 MMHG | RESPIRATION RATE: 18 BRPM

## 2020-06-09 VITALS
DIASTOLIC BLOOD PRESSURE: 60 MMHG | TEMPERATURE: 97.6 F | HEART RATE: 89 BPM | OXYGEN SATURATION: 98 % | RESPIRATION RATE: 16 BRPM | SYSTOLIC BLOOD PRESSURE: 122 MMHG

## 2020-06-09 PROCEDURE — 3331090001 HH PPS REVENUE CREDIT

## 2020-06-09 PROCEDURE — G0151 HHCP-SERV OF PT,EA 15 MIN: HCPCS

## 2020-06-09 PROCEDURE — 3331090002 HH PPS REVENUE DEBIT

## 2020-06-09 PROCEDURE — G0299 HHS/HOSPICE OF RN EA 15 MIN: HCPCS

## 2020-06-09 RX ORDER — SIMVASTATIN 20 MG/1
TABLET, FILM COATED ORAL
Qty: 90 TAB | Refills: 3 | Status: SHIPPED | OUTPATIENT
Start: 2020-06-09 | End: 2020-07-04 | Stop reason: SDUPTHER

## 2020-06-09 RX ORDER — NORTRIPTYLINE HYDROCHLORIDE 10 MG/1
CAPSULE ORAL
Qty: 180 CAP | Refills: 3 | Status: SHIPPED | OUTPATIENT
Start: 2020-06-09 | End: 2020-07-04 | Stop reason: SDUPTHER

## 2020-06-09 NOTE — TELEPHONE ENCOUNTER
PCP: Aubrey Emanuel MD     Last appt: 5/21/2020   Future Appointments   Date Time Provider [place]   6/10/2020  1:00 PM Patricia [address]   6/11/2020  1:00 PM Chase Argueta [facility]   6/15/2020 To Be Determined Rosa Callaway [facility]   6/16/2020 To Be Determined Dario Kang [place] [address] [facility]   6/17/2020 To Be Determined Jenniffer [address]   6/18/2020 11:00 AM Arianna Garcia  [address] Razo   6/19/2020 To Be Determined Katerina Slaughter, BLAYNE [address]   6/22/2020 To Be Determined Rosa Callaway [facility]   6/24/2020 10:30 AM DO AYESHA Recinos   6/25/2020 To Be Determined Jun Altman [facility]        Requested Prescriptions     Pending Prescriptions Disp Refills    simvastatin (ZOCOR) 20 mg tablet 90 Tab 3     Sig: TAKE ONE TABLET BY MOUTH NIGHTLY    nortriptyline (PAMELOR) 10 mg capsule 180 Cap 3     Sig: TAKE 1 TO 2 CAPSULES BY MOUTH AT BEDTIME FOR  SHINGLES  PAIN      [facility] currently seeing pt, granddaughter Pacheco Ab said they would draw lab for 5/18/2020 virtual visit.

## 2020-06-10 ENCOUNTER — HOME CARE VISIT (OUTPATIENT)
Dept: SCHEDULING | Facility: HOME HEALTH | Age: 85
End: 2020-06-10
Payer: MEDICARE

## 2020-06-10 ENCOUNTER — HOME CARE VISIT (OUTPATIENT)
Dept: HOME HEALTH SERVICES | Facility: HOME HEALTH | Age: 85
End: 2020-06-10
Payer: MEDICARE

## 2020-06-10 VITALS
TEMPERATURE: 97.6 F | RESPIRATION RATE: 16 BRPM | SYSTOLIC BLOOD PRESSURE: 122 MMHG | DIASTOLIC BLOOD PRESSURE: 60 MMHG | OXYGEN SATURATION: 98 % | HEART RATE: 88 BPM

## 2020-06-10 PROCEDURE — 3331090002 HH PPS REVENUE DEBIT

## 2020-06-10 PROCEDURE — 3331090001 HH PPS REVENUE CREDIT

## 2020-06-10 PROCEDURE — G0152 HHCP-SERV OF OT,EA 15 MIN: HCPCS

## 2020-06-11 ENCOUNTER — HOME CARE VISIT (OUTPATIENT)
Dept: SCHEDULING | Facility: HOME HEALTH | Age: 85
End: 2020-06-11
Payer: MEDICARE

## 2020-06-11 ENCOUNTER — TELEPHONE (OUTPATIENT)
Dept: INTERNAL MEDICINE CLINIC | Facility: CLINIC | Age: 85
End: 2020-06-11

## 2020-06-11 VITALS
RESPIRATION RATE: 18 BRPM | TEMPERATURE: 97.3 F | DIASTOLIC BLOOD PRESSURE: 68 MMHG | HEART RATE: 67 BPM | OXYGEN SATURATION: 96 % | SYSTOLIC BLOOD PRESSURE: 122 MMHG

## 2020-06-11 VITALS
HEART RATE: 92 BPM | TEMPERATURE: 97.9 F | DIASTOLIC BLOOD PRESSURE: 70 MMHG | OXYGEN SATURATION: 94 % | SYSTOLIC BLOOD PRESSURE: 116 MMHG | RESPIRATION RATE: 16 BRPM

## 2020-06-11 DIAGNOSIS — K21.9 GERD WITHOUT ESOPHAGITIS: ICD-10-CM

## 2020-06-11 PROCEDURE — 3331090002 HH PPS REVENUE DEBIT

## 2020-06-11 PROCEDURE — 3331090001 HH PPS REVENUE CREDIT

## 2020-06-11 PROCEDURE — G0151 HHCP-SERV OF PT,EA 15 MIN: HCPCS

## 2020-06-11 RX ORDER — PHENOL/SODIUM PHENOLATE
20 AEROSOL, SPRAY (ML) MUCOUS MEMBRANE DAILY
Qty: 90 TAB | Refills: 3 | OUTPATIENT
Start: 2020-06-11

## 2020-06-11 NOTE — TELEPHONE ENCOUNTER
Eyad Robbins called interference to the pt medications. She stated that she would like to be able to get the metoprolol before the 14th and she is also having a problem with the synthroid. She stated the pharmacy wants to change it and only do a 23 day supply when she needs that at a 90 day supply. Eyad Robbins stated that she is trying to get it to where all the pt medications are 90 days and ready at the same time so she only has to make on trip.  Eyad Robbins asked that if you don't call before 1 to call after 3

## 2020-06-11 NOTE — TELEPHONE ENCOUNTER
----- Message from Cherrie Sheets sent at 6/10/2020  5:20 PM EDT -----  Regarding: Dr. Flex Davenport, with [address] stated script received for \"Amprozole\" capsules. Pts prefers tablets. Pharmacy stated it is not urgent and can be addressed tomorrow.      [facility] phone: [phone]

## 2020-06-11 NOTE — TELEPHONE ENCOUNTER
PCP: Anup Leal MD     Last appt: 5/21/2020   Future Appointments   Date Time Provider [place]   6/11/2020  1:00 PM Sinan, [address]   6/15/2020  1:00 PM Patricia De [address]   6/16/2020 To Be Determined Mariah Gorman [facility]   6/17/2020  1:00 PM Fredleila Kessler [facility]   6/18/2020 11:00 AM Jagdish Sarkar MD [facility]   6/19/2020 To Be Determined Akua Hassan PT [place] [id number] [facility]   6/22/2020 To Be Determined Anupam Sadiq [place] [address] [facility]   6/24/2020 10:30 AM Shelba Sandifer, DO [facility] SCHED   6/25/2020 To Be Determined Augusto Park [facility]        Requested Prescriptions     Pending Prescriptions Disp Refills    Omeprazole delayed release (PRILOSEC D/R) 20 mg tablet 90 Tab 3     Sig: Take 1 Tab by mouth daily.

## 2020-06-12 PROCEDURE — 3331090002 HH PPS REVENUE DEBIT

## 2020-06-12 PROCEDURE — 3331090001 HH PPS REVENUE CREDIT

## 2020-06-13 PROCEDURE — 3331090002 HH PPS REVENUE DEBIT

## 2020-06-13 PROCEDURE — 3331090001 HH PPS REVENUE CREDIT

## 2020-06-14 PROCEDURE — 3331090002 HH PPS REVENUE DEBIT

## 2020-06-14 PROCEDURE — 3331090001 HH PPS REVENUE CREDIT

## 2020-06-15 ENCOUNTER — HOME CARE VISIT (OUTPATIENT)
Dept: SCHEDULING | Facility: HOME HEALTH | Age: 85
End: 2020-06-15
Payer: MEDICARE

## 2020-06-15 PROCEDURE — 3331090002 HH PPS REVENUE DEBIT

## 2020-06-15 PROCEDURE — 3331090001 HH PPS REVENUE CREDIT

## 2020-06-15 PROCEDURE — G0152 HHCP-SERV OF OT,EA 15 MIN: HCPCS

## 2020-06-15 NOTE — PROGRESS NOTES
Discuss at OV 5/3
Inform patient kidney test is stable. Thyroid looks a little under-replaced. Confirm levothyroxine 50 mg 2 TABLETS BY MOUTH ON SUNDAYS AND ONE TABLET THE OTHER 6 DAYS OF THE WEEK. [place] to 2 tablets on Sat and Sun and 1 tablet the other 5 days. She should reschedule appointment. If at least 6 weeks from now, we can recheck thyroid blood test then.
Letter sent
VM left for return call to the office.
5

## 2020-06-16 ENCOUNTER — HOME CARE VISIT (OUTPATIENT)
Dept: SCHEDULING | Facility: HOME HEALTH | Age: 85
End: 2020-06-16
Payer: MEDICARE

## 2020-06-16 VITALS — RESPIRATION RATE: 18 BRPM | TEMPERATURE: 97.2 F | DIASTOLIC BLOOD PRESSURE: 68 MMHG | SYSTOLIC BLOOD PRESSURE: 118 MMHG

## 2020-06-16 PROCEDURE — G0151 HHCP-SERV OF PT,EA 15 MIN: HCPCS

## 2020-06-16 PROCEDURE — 3331090002 HH PPS REVENUE DEBIT

## 2020-06-16 PROCEDURE — 3331090001 HH PPS REVENUE CREDIT

## 2020-06-17 ENCOUNTER — HOME CARE VISIT (OUTPATIENT)
Dept: HOME HEALTH SERVICES | Facility: HOME HEALTH | Age: 85
End: 2020-06-17
Payer: MEDICARE

## 2020-06-17 ENCOUNTER — HOME CARE VISIT (OUTPATIENT)
Dept: SCHEDULING | Facility: HOME HEALTH | Age: 85
End: 2020-06-17
Payer: MEDICARE

## 2020-06-17 VITALS
RESPIRATION RATE: 16 BRPM | TEMPERATURE: 98.4 F | DIASTOLIC BLOOD PRESSURE: 68 MMHG | SYSTOLIC BLOOD PRESSURE: 118 MMHG | OXYGEN SATURATION: 93 % | HEART RATE: 64 BPM

## 2020-06-17 PROCEDURE — G0152 HHCP-SERV OF OT,EA 15 MIN: HCPCS

## 2020-06-17 PROCEDURE — 3331090002 HH PPS REVENUE DEBIT

## 2020-06-17 PROCEDURE — 3331090001 HH PPS REVENUE CREDIT

## 2020-06-18 ENCOUNTER — HOME CARE VISIT (OUTPATIENT)
Dept: SCHEDULING | Facility: HOME HEALTH | Age: 85
End: 2020-06-18
Payer: MEDICARE

## 2020-06-18 ENCOUNTER — VIRTUAL VISIT (OUTPATIENT)
Dept: INTERNAL MEDICINE CLINIC | Facility: CLINIC | Age: 85
End: 2020-06-18

## 2020-06-18 VITALS
TEMPERATURE: 98 F | DIASTOLIC BLOOD PRESSURE: 78 MMHG | RESPIRATION RATE: 18 BRPM | OXYGEN SATURATION: 93 % | HEART RATE: 75 BPM | SYSTOLIC BLOOD PRESSURE: 122 MMHG

## 2020-06-18 VITALS
TEMPERATURE: 98.8 F | OXYGEN SATURATION: 92 % | RESPIRATION RATE: 16 BRPM | SYSTOLIC BLOOD PRESSURE: 118 MMHG | HEART RATE: 82 BPM | DIASTOLIC BLOOD PRESSURE: 68 MMHG

## 2020-06-18 PROCEDURE — G0151 HHCP-SERV OF PT,EA 15 MIN: HCPCS

## 2020-06-18 PROCEDURE — 3331090001 HH PPS REVENUE CREDIT

## 2020-06-18 PROCEDURE — 3331090002 HH PPS REVENUE DEBIT

## 2020-06-18 NOTE — Clinical Note
Follow up in 4 months, preferably with a physician, for DM, HTN, CKD, thyroid, CVD.  NON-fasting lab one week prior

## 2020-06-18 NOTE — PROGRESS NOTES
Juancho Ornelas is a 80 y.o. female evaluated via audio only technology on 6/18/2020. This was a previously scheduled appointment that should have been cancelled.

## 2020-06-18 NOTE — PROGRESS NOTES
Karen Gandhi  Identified pt with two pt identifiers(name and ). Chief Complaint Patient presents with  Hypertension  Cholesterol Problem  Thyroid Problem  Chronic Kidney Disease Reviewed record In preparation for visit and have obtained necessary documentation. 1. Have you been to the ER, urgent care clinic or hospitalized since your last visit? No  
 
2. Have you seen or consulted any other health care providers outside of the [address] [place] [address] since your last visit? Include any pap smears or colon screening. Home Health Vitals reviewed with provider. Health Maintenance reviewed:  
 
Health Maintenance Due Topic  Shingrix Vaccine Age 50> (1 of 2)  Lipid Screen Wt Readings from Last 3 Encounters:  
20 126 lb (57.2 kg) 20 136 lb 8 oz (61.9 kg) 20 121 lb (54.9 kg) Temp Readings from Last 3 Encounters:  
20 98 °F (36.7 °C) (Temporal) 20 98.4 °F (36.9 °C) (Temporal) 06/15/20 97.2 °F (36.2 °C) (Temporal) BP Readings from Last 3 Encounters:  
20 122/78  
20 118/68  
06/15/20 118/68 Pulse Readings from Last 3 Encounters:  
20 75  
20 64  
20 92 Vitals:  
 20 1100 PainSc:   0 - No pain Learning Assessment:  
:  
 
 
Learning Assessment 2017 PRIMARY LEARNER Patient Patient Patient HIGHEST LEVEL OF EDUCATION - PRIMARY LEARNER  - DID NOT GRADUATE HIGH SCHOOL DID NOT GRADUATE HIGH SCHOOL  
BARRIERS PRIMARY LEARNER HEARING HEARING HEARING  
[address] CAREGIVER - Yes Yes [facility] [facility] William/Brenden Thomas/Jamin -  
CO-LEARNER HIGHEST LEVEL OF EDUCATION - [place] PRIMARY LANGUAGE ENGLISH ENGLISH ENGLISH  
PRIMARY LANGUAGE CO-LEARNER - - ENGLISH  NEED - - No  
 LEARNER PREFERENCE PRIMARY LISTENING LISTENING LISTENING  
LEARNER PREFERENCE CO-LEARNER - - OTHER (COMMENT) LEARNING SPECIAL TOPICS - - NA  
ANSWERED BY Patient Patient patient RELATIONSHIP SELF SELF SELF Depression Screening:  
:  
 
 
3 most recent PHQ Screens 2/27/2020 Little interest or pleasure in doing things Not at all Feeling down, depressed, irritable, or hopeless Not at all Total Score PHQ 2 0 Trouble falling or staying asleep, or sleeping too much - Feeling tired or having little energy - Poor appetite, weight loss, or overeating - Feeling bad about yourself - or that you are a failure or have let yourself or your family down - Trouble concentrating on things such as school, work, reading, or watching TV - Moving or speaking so slowly that other people could have noticed; or the opposite being so fidgety that others notice - Thoughts of being better off dead, or hurting yourself in some way -  
PHQ 9 Score - How difficult have these problems made it for you to do your work, take care of your home and get along with others - Fall Risk Assessment:  
:  
 
 
Fall Risk Assessment, last 12 mths 2/27/2020 Able to walk? No  
Fall in past 12 months? -  
Fall with injury? -  
Number of falls in past 12 months - Fall Risk Score -  
  
 
Abuse Screening:  
:  
 
 
Abuse Screening Questionnaire 5/21/2020 10/17/2019 7/13/2018 8/4/2017 8/30/2016 6/18/2015 6/18/2014 Do you ever feel afraid of your partner? N N N N N N N Are you in a relationship with someone who physically or mentally threatens you? N N N N N N N Is it safe for you to go home? Y Y Y Y Y Y Y  
  
 
ADL Screening:  
:  
 
 
ADL Assessment 12/1/2014 Feeding yourself No Help Needed Getting from bed to chair No Help Needed Getting dressed No Help Needed Bathing or showering No Help Needed Walk across the room (includes cane/walker) No Help Needed Using the telphone No Help Needed Taking your medications Help Needed Preparing meals No Help Needed Managing money (expenses/bills) No Help Needed Moderately strenuous housework (laundry) No Help Needed Shopping for personal items (toiletries/medicines) Help Needed Shopping for groceries Help Needed Driving Help Needed Climbing a flight of stairs No Help Needed Getting to places beyond walking distances No Help Needed

## 2020-06-19 PROCEDURE — 3331090003 HH PPS REVENUE ADJ

## 2020-06-19 PROCEDURE — 3331090001 HH PPS REVENUE CREDIT

## 2020-06-19 PROCEDURE — 3331090002 HH PPS REVENUE DEBIT

## 2020-06-20 PROCEDURE — 3331090002 HH PPS REVENUE DEBIT

## 2020-06-20 PROCEDURE — 3331090001 HH PPS REVENUE CREDIT

## 2020-06-21 PROCEDURE — 3331090002 HH PPS REVENUE DEBIT

## 2020-06-21 PROCEDURE — 3331090001 HH PPS REVENUE CREDIT

## 2020-06-22 ENCOUNTER — HOME CARE VISIT (OUTPATIENT)
Dept: HOME HEALTH SERVICES | Facility: HOME HEALTH | Age: 85
End: 2020-06-22
Payer: MEDICARE

## 2020-06-22 ENCOUNTER — HOME CARE VISIT (OUTPATIENT)
Dept: SCHEDULING | Facility: HOME HEALTH | Age: 85
End: 2020-06-22
Payer: MEDICARE

## 2020-06-22 PROCEDURE — 3331090001 HH PPS REVENUE CREDIT

## 2020-06-22 PROCEDURE — 400013 HH SOC

## 2020-06-22 PROCEDURE — G0152 HHCP-SERV OF OT,EA 15 MIN: HCPCS

## 2020-06-22 PROCEDURE — 3331090002 HH PPS REVENUE DEBIT

## 2020-06-23 ENCOUNTER — HOME CARE VISIT (OUTPATIENT)
Dept: SCHEDULING | Facility: HOME HEALTH | Age: 85
End: 2020-06-23
Payer: MEDICARE

## 2020-06-23 VITALS
OXYGEN SATURATION: 94 % | DIASTOLIC BLOOD PRESSURE: 72 MMHG | SYSTOLIC BLOOD PRESSURE: 128 MMHG | TEMPERATURE: 98.6 F | RESPIRATION RATE: 16 BRPM | HEART RATE: 88 BPM

## 2020-06-23 VITALS
DIASTOLIC BLOOD PRESSURE: 72 MMHG | RESPIRATION RATE: 18 BRPM | TEMPERATURE: 97 F | HEART RATE: 85 BPM | SYSTOLIC BLOOD PRESSURE: 110 MMHG | OXYGEN SATURATION: 95 %

## 2020-06-23 PROCEDURE — 3331090002 HH PPS REVENUE DEBIT

## 2020-06-23 PROCEDURE — G0151 HHCP-SERV OF PT,EA 15 MIN: HCPCS

## 2020-06-23 PROCEDURE — 3331090001 HH PPS REVENUE CREDIT

## 2020-06-24 PROCEDURE — 3331090001 HH PPS REVENUE CREDIT

## 2020-06-24 PROCEDURE — 3331090002 HH PPS REVENUE DEBIT

## 2020-06-25 ENCOUNTER — HOME CARE VISIT (OUTPATIENT)
Dept: SCHEDULING | Facility: HOME HEALTH | Age: 85
End: 2020-06-25
Payer: MEDICARE

## 2020-06-25 ENCOUNTER — HOME CARE VISIT (OUTPATIENT)
Dept: HOME HEALTH SERVICES | Facility: HOME HEALTH | Age: 85
End: 2020-06-25
Payer: MEDICARE

## 2020-06-25 ENCOUNTER — TELEPHONE (OUTPATIENT)
Dept: INTERNAL MEDICINE CLINIC | Facility: CLINIC | Age: 85
End: 2020-06-25

## 2020-06-25 VITALS
TEMPERATURE: 97.7 F | DIASTOLIC BLOOD PRESSURE: 70 MMHG | SYSTOLIC BLOOD PRESSURE: 134 MMHG | RESPIRATION RATE: 16 BRPM | HEART RATE: 84 BPM | OXYGEN SATURATION: 94 %

## 2020-06-25 PROCEDURE — 3331090002 HH PPS REVENUE DEBIT

## 2020-06-25 PROCEDURE — 3331090001 HH PPS REVENUE CREDIT

## 2020-06-25 PROCEDURE — G0151 HHCP-SERV OF PT,EA 15 MIN: HCPCS

## 2020-06-25 NOTE — TELEPHONE ENCOUNTER
Verified patients name and date of birth. Jennie Contreras verbal order per Dr Jean Carlos Otoole to extend PT times two weeks.

## 2020-06-25 NOTE — TELEPHONE ENCOUNTER
Susannah Anthony from [facility] seeking a verbal order for additional PT for 2 more weeks to continue stair training. Please return call at [phone].

## 2020-06-26 ENCOUNTER — HOME CARE VISIT (OUTPATIENT)
Dept: HOME HEALTH SERVICES | Facility: HOME HEALTH | Age: 85
End: 2020-06-26
Payer: MEDICARE

## 2020-06-26 ENCOUNTER — HOME CARE VISIT (OUTPATIENT)
Dept: SCHEDULING | Facility: HOME HEALTH | Age: 85
End: 2020-06-26
Payer: MEDICARE

## 2020-06-26 VITALS — SYSTOLIC BLOOD PRESSURE: 112 MMHG | TEMPERATURE: 98.2 F | DIASTOLIC BLOOD PRESSURE: 70 MMHG

## 2020-06-26 PROCEDURE — 3331090002 HH PPS REVENUE DEBIT

## 2020-06-26 PROCEDURE — G0152 HHCP-SERV OF OT,EA 15 MIN: HCPCS

## 2020-06-26 PROCEDURE — 3331090001 HH PPS REVENUE CREDIT

## 2020-06-27 PROCEDURE — 3331090002 HH PPS REVENUE DEBIT

## 2020-06-27 PROCEDURE — 3331090001 HH PPS REVENUE CREDIT

## 2020-06-28 PROCEDURE — 3331090002 HH PPS REVENUE DEBIT

## 2020-06-28 PROCEDURE — 3331090001 HH PPS REVENUE CREDIT

## 2020-06-29 ENCOUNTER — HOME CARE VISIT (OUTPATIENT)
Dept: SCHEDULING | Facility: HOME HEALTH | Age: 85
End: 2020-06-29
Payer: MEDICARE

## 2020-06-29 VITALS
TEMPERATURE: 98.1 F | OXYGEN SATURATION: 94 % | SYSTOLIC BLOOD PRESSURE: 124 MMHG | DIASTOLIC BLOOD PRESSURE: 76 MMHG | RESPIRATION RATE: 16 BRPM | HEART RATE: 74 BPM

## 2020-06-29 PROCEDURE — 3331090002 HH PPS REVENUE DEBIT

## 2020-06-29 PROCEDURE — G0151 HHCP-SERV OF PT,EA 15 MIN: HCPCS

## 2020-06-29 PROCEDURE — 3331090001 HH PPS REVENUE CREDIT

## 2020-06-30 ENCOUNTER — TELEPHONE (OUTPATIENT)
Dept: INTERNAL MEDICINE CLINIC | Facility: CLINIC | Age: 85
End: 2020-06-30

## 2020-06-30 DIAGNOSIS — Z11.1 SCREENING FOR TUBERCULOSIS: Primary | ICD-10-CM

## 2020-06-30 PROCEDURE — 3331090002 HH PPS REVENUE DEBIT

## 2020-06-30 PROCEDURE — 3331090001 HH PPS REVENUE CREDIT

## 2020-06-30 NOTE — TELEPHONE ENCOUNTER
Krishna Milan said pt will was taken out of nursing home as pt's grandson's wife was furloughed and would take care of her. She now has to go back to work on Monday and pt is going to the [facility]. Pt will need a COVID 19, chest x-ray and a form to be filled out. Form has not shown up in our faxes yet. Informed pt will need to be seen before we can fill form out. Krishna Milan is calling to have form faxed again.

## 2020-06-30 NOTE — TELEPHONE ENCOUNTER
Nisha Magañae called to say she has to put her mother into an assisted living facility by Monday and she needs some testing prior but needs to discuss how to go about it and where to go, the facility is faxing over a form to be filled out.   [phone]

## 2020-07-01 ENCOUNTER — VIRTUAL VISIT (OUTPATIENT)
Dept: ORTHOPEDIC SURGERY | Age: 85
End: 2020-07-01

## 2020-07-01 DIAGNOSIS — S72.91XD ORTHOPEDIC AFTERCARE FOR HEALING TRAUMATIC FRACTURE OF RIGHT UPPER LEG: Primary | ICD-10-CM

## 2020-07-01 PROCEDURE — 3331090001 HH PPS REVENUE CREDIT

## 2020-07-01 PROCEDURE — 3331090002 HH PPS REVENUE DEBIT

## 2020-07-02 ENCOUNTER — HOME CARE VISIT (OUTPATIENT)
Dept: SCHEDULING | Facility: HOME HEALTH | Age: 85
End: 2020-07-02
Payer: MEDICARE

## 2020-07-02 PROCEDURE — 3331090001 HH PPS REVENUE CREDIT

## 2020-07-02 PROCEDURE — 3331090002 HH PPS REVENUE DEBIT

## 2020-07-02 NOTE — TELEPHONE ENCOUNTER
She has to go to [address] [facility] 19 test. I do not know if they can do the CXR. Otherwise that will have to be done at the hospital. Not knowing what is on the form, I cannot promise it can be filled out without a F2F visit. If we cannot, we will let her know.

## 2020-07-02 NOTE — TELEPHONE ENCOUNTER
I talked to Jaiden Ayers (on HIPPA) and verified the patient by name and date of birth. They stated the patient had the COVID-19 testing done today at [facility]. [facility] gave them information for a portal where they can see the results and they can email the results to whoever they want. The patient now needs a tb test and they wanted to know if the patient could come in for a tb test. I informed Ms. Lizet Miller that we could possibly place the TB but the patient would have to be back in two days. I had not looked at the form and did not know if the patient could do a chest xray instead or get the TB blood work done. I informed Ms. Lizet Fonsecaumgartner on the message per Dr. Marcial Ortiz. I also informed MsScotty Miller that Dr. Marcial Ortiz has not looked at the papers yet. They would like to know if the patient needs a face to face and if they do can they get the TB test done then. I informed them that Dr. Marcial Ortiz normally comes in on the weekends, but because it is a holiday I can not guarantee when she is coming in. I informed them someone would call on Monday with more information. MsScotty Lizet FonsecaAngela stated they will call the office on Monday by the afternoon.

## 2020-07-02 NOTE — PROGRESS NOTES
7/1/2020 CC: right hip fracture follow up HPI:      This is a 8 y.o. year old female who presents for a follow up visit. The patient was last seen and diagnosed with right intertrochanteric hip fracture. The patient's treatments since the most recent visit have comprised of PT. The patient has had good relief of the chief complaint. Some anterior thigh pain remains. PMH: 
Past Medical History:  
Diagnosis Date  Chronic kidney disease  CKD (chronic kidney disease) stage 3, GFR 30-59 ml/min ([facility]) 5/17/2010  Hypercholesteremia  Hypertension  Post herpetic neuralgia  Stroke ([facility] [address]  Thyroid disease PSxHx: 
Past Surgical History:  
Procedure Laterality Date  HX APPENDECTOMY  HX HYSTERECTOMY  1947  
 one ovary out Meds: 
 
Current Outpatient Medications:  
  simvastatin (ZOCOR) 20 mg tablet, TAKE ONE TABLET BY MOUTH NIGHTLY, Disp: 90 Tab, Rfl: 3 
  nortriptyline (PAMELOR) 10 mg capsule, TAKE 1 TO 2 CAPSULES BY MOUTH AT BEDTIME FOR  SHINGLES  PAIN, Disp: 180 Cap, Rfl: 3 
  levothyroxine (SYNTHROID) 50 mcg tablet, TAKE 2 TABLETS BY MOUTH ON SATURDAY AND SUNDAY AND TAKE 1 TABLET THE OTHER 5 DAYS, Disp: 117 Tab, Rfl: 3 
  polyethylene glycol (Miralax) 17 gram/dose powder, 1 tablespoon with 8 oz of water daily as needed for constipation, Disp: 510 g, Rfl: 0 
  metoprolol tartrate (LOPRESSOR) 25 mg tablet, TAKE ONE HALF TABLET BY MOUTH ONCE DAILY FOR HYPERTENSION, Disp: 45 Tab, Rfl: 3 
  omeprazole (PRILOSEC) 20 mg capsule, Take 1 Cap by mouth daily. , Disp: 90 Cap, Rfl: 3 
  docusate sodium (COLACE) 100 mg capsule, Take 1 Tab by mouth daily as needed for Constipation. 50mg as needed, Disp: , Rfl:  
  vit C,K-Kr-alnhf-lutein-zeaxan (PRESERVISION AREDS-2) 270-920-62-1 mg-unit-mg-mg cap capsule, Take 1 Cap by mouth two (2) times daily (with meals). , Disp: , Rfl:  
  aspirin delayed-release 81 mg tablet, Take 1 Tab by mouth daily. , Disp: 90 Tab, Rfl: 3   acetaminophen (TYLENOL EXTRA STRENGTH) 500 mg tablet, Take 1-2 Tabs by mouth every six (6) hours as needed for Pain., Disp: , Rfl:  
 
All: 
Allergies Allergen Reactions  Lyrica [Pregabalin] Other (comments) Couldn't function  Macrobid [Nitrofurantoin Monohyd/M-Cryst] Other (comments)  
  sick  Neurontin [Gabapentin] Other (comments) groggy Social Hx: 
Social History Socioeconomic History  Marital status:  Spouse name: Not on file  Number of children: Not on file  Years of education: Not on file  Highest education level: Not on file Tobacco Use  Smoking status: Never Smoker  Smokeless tobacco: Never Used Substance and Sexual Activity  Alcohol use: No  
 Drug use: No  
 
 
Family Hx: 
Family History Problem Relation Age of Onset  Cancer Father Review of Systems:  
 
 
General: Denies headache, lethargy, fever, weight loss Ears/Nose/Throat: Denies ear discharge, drainage, nosebleeds, hoarse voice, dental problems Cardiovascular: Denies chest pain, shortness of breath Lungs: Denies chest pain, breathing problems, wheezing, pneumonia Stomach: Denies stomach pain, heartburn, constipation, irritable bowel Skin: Denies rash, sores, open wounds Musculoskeletal: mild residual thigh pain Genitourinary: Denies dysuria, hematuria, polyuria Gastrointestinal: Denies constipation, obstipation, diarrhea Neurological: Denies changes in sight, smell, hearing, taste, seizures. Denies loss of consciousness. Psychiatric: Denies depression, sleep pattern changes, anxiety, change in personality Endocrine: Denies mood swings, heat or cold intolerance Hematologic/Lymphatic: Denies anemia, purpura, petechia Allergic/Immunologic: Denies swelling of throat, pain or swelling at lymph nodes Physical Examination: 
 
There were no vitals taken for this visit. General: AOX3, no apparent distress Psychiatric: mood and affect appropriate Diagnostics: 
 
Pertinent Diagnostics: Right hip xrays indicate healed intertrochanteric fracture, no cut out, loosening, or failure. Assessment: right intertrochanteric hip fracture, healed. Plan: 
 
Plan for continued PT, weight bearing as tolerated. Pain control as needed. Follow up in 3 months for repeat clinical check, no xrays needed. Telemedicine modality: telephone Location of patient: home Location of physician: office Time spent in consultation:11 minutes The patient has been made aware of the billing practices of telemedicine. Ms. Edd Purcell has a reminder for a \"due or due soon\" health maintenance. I have asked that she contact her primary care provider for follow-up on this health maintenance.

## 2020-07-03 PROCEDURE — 3331090002 HH PPS REVENUE DEBIT

## 2020-07-03 PROCEDURE — 3331090001 HH PPS REVENUE CREDIT

## 2020-07-04 DIAGNOSIS — I12.9 BENIGN HYPERTENSION WITH CHRONIC KIDNEY DISEASE, STAGE IV (HCC): ICD-10-CM

## 2020-07-04 DIAGNOSIS — K21.9 GERD WITHOUT ESOPHAGITIS: ICD-10-CM

## 2020-07-04 DIAGNOSIS — I73.9 ASYMPTOMATIC PVD (PERIPHERAL VASCULAR DISEASE) (HCC): ICD-10-CM

## 2020-07-04 DIAGNOSIS — E03.4 HYPOTHYROIDISM DUE TO ACQUIRED ATROPHY OF THYROID: ICD-10-CM

## 2020-07-04 DIAGNOSIS — N18.4 BENIGN HYPERTENSION WITH CHRONIC KIDNEY DISEASE, STAGE IV (HCC): ICD-10-CM

## 2020-07-04 DIAGNOSIS — K59.01 SLOW TRANSIT CONSTIPATION: ICD-10-CM

## 2020-07-04 PROCEDURE — 3331090002 HH PPS REVENUE DEBIT

## 2020-07-04 PROCEDURE — 3331090001 HH PPS REVENUE CREDIT

## 2020-07-04 RX ORDER — SIMVASTATIN 20 MG/1
TABLET, FILM COATED ORAL
Qty: 90 TAB | Refills: 3 | Status: SHIPPED | OUTPATIENT
Start: 2020-07-04 | End: 2020-07-15 | Stop reason: SDUPTHER

## 2020-07-04 RX ORDER — METOPROLOL TARTRATE 25 MG/1
TABLET, FILM COATED ORAL
Qty: 45 TAB | Refills: 3 | Status: SHIPPED | OUTPATIENT
Start: 2020-07-04 | End: 2020-07-15 | Stop reason: SDUPTHER

## 2020-07-04 RX ORDER — NORTRIPTYLINE HYDROCHLORIDE 10 MG/1
CAPSULE ORAL
Qty: 180 CAP | Refills: 3 | Status: SHIPPED | OUTPATIENT
Start: 2020-07-04 | End: 2020-07-15 | Stop reason: SDUPTHER

## 2020-07-04 RX ORDER — DOCUSATE SODIUM 100 MG/1
100 CAPSULE, LIQUID FILLED ORAL
Qty: 90 CAP | Refills: 3 | Status: SHIPPED | OUTPATIENT
Start: 2020-07-04 | End: 2020-07-15 | Stop reason: SDUPTHER

## 2020-07-04 RX ORDER — ACETAMINOPHEN 500 MG
500-1000 TABLET ORAL
Qty: 100 TAB | Refills: 5 | Status: SHIPPED | OUTPATIENT
Start: 2020-07-04 | End: 2020-07-15 | Stop reason: SDUPTHER

## 2020-07-04 RX ORDER — VIT C/E/ZN/COPPR/LUTEIN/ZEAXAN 250MG-90MG
1 CAPSULE ORAL 2 TIMES DAILY WITH MEALS
Qty: 180 CAP | Refills: 3 | Status: SHIPPED | OUTPATIENT
Start: 2020-07-04 | End: 2020-07-15 | Stop reason: SDUPTHER

## 2020-07-04 RX ORDER — LEVOTHYROXINE SODIUM 50 UG/1
TABLET ORAL
Qty: 117 TAB | Refills: 3 | Status: SHIPPED | OUTPATIENT
Start: 2020-07-04 | End: 2020-07-15 | Stop reason: SDUPTHER

## 2020-07-04 RX ORDER — OMEPRAZOLE 20 MG/1
CAPSULE, DELAYED RELEASE ORAL
Qty: 90 CAP | Refills: 3 | Status: SHIPPED | OUTPATIENT
Start: 2020-07-04 | End: 2020-09-25

## 2020-07-04 RX ORDER — ASPIRIN 81 MG/1
81 TABLET ORAL DAILY
Qty: 90 TAB | Refills: 3 | Status: SHIPPED | OUTPATIENT
Start: 2020-07-04 | End: 2020-07-15 | Stop reason: SDUPTHER

## 2020-07-04 RX ORDER — POLYETHYLENE GLYCOL 3350 17 G/17G
POWDER, FOR SOLUTION ORAL
Qty: 510 G | Refills: 0 | Status: SHIPPED | OUTPATIENT
Start: 2020-07-04 | End: 2020-07-15 | Stop reason: SDUPTHER

## 2020-07-04 NOTE — TELEPHONE ENCOUNTER
She has a CXR in March at the hospital which I entered on the forms. If they still want a TB skin test, we can do that as nurse visit. Will have to return in window between 48 and 72 hours to have it read.  I am hoping recent CXR will suffice Might want to confirm with [address] [phone]

## 2020-07-05 PROCEDURE — 3331090001 HH PPS REVENUE CREDIT

## 2020-07-05 PROCEDURE — 3331090002 HH PPS REVENUE DEBIT

## 2020-07-06 ENCOUNTER — HOME CARE VISIT (OUTPATIENT)
Dept: SCHEDULING | Facility: HOME HEALTH | Age: 85
End: 2020-07-06
Payer: MEDICARE

## 2020-07-06 ENCOUNTER — TELEPHONE (OUTPATIENT)
Dept: INTERNAL MEDICINE CLINIC | Facility: CLINIC | Age: 85
End: 2020-07-06

## 2020-07-06 VITALS
TEMPERATURE: 97.8 F | DIASTOLIC BLOOD PRESSURE: 68 MMHG | SYSTOLIC BLOOD PRESSURE: 142 MMHG | HEART RATE: 76 BPM | OXYGEN SATURATION: 98 % | RESPIRATION RATE: 18 BRPM

## 2020-07-06 PROCEDURE — G0152 HHCP-SERV OF OT,EA 15 MIN: HCPCS

## 2020-07-06 PROCEDURE — 3331090001 HH PPS REVENUE CREDIT

## 2020-07-06 PROCEDURE — 3331090002 HH PPS REVENUE DEBIT

## 2020-07-06 NOTE — TELEPHONE ENCOUNTER
Deana Rodriguez from [facility] notified of forms for pt, she stated if chest a-ray was done and form was filled out the no TB test is needed. Forms faxed and confirmation received.

## 2020-07-06 NOTE — TELEPHONE ENCOUNTER
Terence David called and notified that forms had been faxed to [facility] and chest x-ray took place of TB test. No further questions at this time.

## 2020-07-06 NOTE — TELEPHONE ENCOUNTER
Please call Otilia Chaparro in ref to the TB test and and if patient needs an office visit  [phone] sometimes cell service is not great

## 2020-07-07 ENCOUNTER — HOME CARE VISIT (OUTPATIENT)
Dept: SCHEDULING | Facility: HOME HEALTH | Age: 85
End: 2020-07-07
Payer: MEDICARE

## 2020-07-07 VITALS
TEMPERATURE: 97.8 F | RESPIRATION RATE: 18 BRPM | SYSTOLIC BLOOD PRESSURE: 128 MMHG | HEART RATE: 96 BPM | DIASTOLIC BLOOD PRESSURE: 64 MMHG | OXYGEN SATURATION: 92 %

## 2020-07-07 PROCEDURE — 3331090001 HH PPS REVENUE CREDIT

## 2020-07-07 PROCEDURE — G0151 HHCP-SERV OF PT,EA 15 MIN: HCPCS

## 2020-07-07 PROCEDURE — 3331090002 HH PPS REVENUE DEBIT

## 2020-07-08 ENCOUNTER — HOME CARE VISIT (OUTPATIENT)
Dept: SCHEDULING | Facility: HOME HEALTH | Age: 85
End: 2020-07-08
Payer: MEDICARE

## 2020-07-08 VITALS
HEART RATE: 77 BPM | DIASTOLIC BLOOD PRESSURE: 62 MMHG | OXYGEN SATURATION: 94 % | TEMPERATURE: 97.9 F | RESPIRATION RATE: 18 BRPM | SYSTOLIC BLOOD PRESSURE: 118 MMHG

## 2020-07-08 PROCEDURE — 3331090001 HH PPS REVENUE CREDIT

## 2020-07-08 PROCEDURE — G0152 HHCP-SERV OF OT,EA 15 MIN: HCPCS

## 2020-07-08 PROCEDURE — 3331090002 HH PPS REVENUE DEBIT

## 2020-07-09 ENCOUNTER — HOME CARE VISIT (OUTPATIENT)
Dept: SCHEDULING | Facility: HOME HEALTH | Age: 85
End: 2020-07-09
Payer: MEDICARE

## 2020-07-09 VITALS
OXYGEN SATURATION: 94 % | TEMPERATURE: 98.1 F | DIASTOLIC BLOOD PRESSURE: 68 MMHG | RESPIRATION RATE: 18 BRPM | SYSTOLIC BLOOD PRESSURE: 118 MMHG | HEART RATE: 78 BPM

## 2020-07-09 LAB — SARS-COV-2, NAA: NOT DETECTED

## 2020-07-09 PROCEDURE — 3331090001 HH PPS REVENUE CREDIT

## 2020-07-09 PROCEDURE — 3331090002 HH PPS REVENUE DEBIT

## 2020-07-10 ENCOUNTER — HOSPITAL ENCOUNTER (OUTPATIENT)
Dept: GENERAL RADIOLOGY | Age: 85
Discharge: HOME | End: 2020-07-10
Payer: MEDICARE

## 2020-07-10 DIAGNOSIS — Z11.1 SCREENING FOR TUBERCULOSIS: ICD-10-CM

## 2020-07-10 PROCEDURE — 71046 X-RAY EXAM CHEST 2 VIEWS: CPT

## 2020-07-10 PROCEDURE — 3331090001 HH PPS REVENUE CREDIT

## 2020-07-10 PROCEDURE — 3331090002 HH PPS REVENUE DEBIT

## 2020-07-11 PROCEDURE — 3331090002 HH PPS REVENUE DEBIT

## 2020-07-11 PROCEDURE — 3331090001 HH PPS REVENUE CREDIT

## 2020-07-12 PROCEDURE — 3331090001 HH PPS REVENUE CREDIT

## 2020-07-12 PROCEDURE — 3331090002 HH PPS REVENUE DEBIT

## 2020-07-13 ENCOUNTER — HOME CARE VISIT (OUTPATIENT)
Dept: SCHEDULING | Facility: HOME HEALTH | Age: 85
End: 2020-07-13
Payer: MEDICARE

## 2020-07-13 PROCEDURE — 3331090001 HH PPS REVENUE CREDIT

## 2020-07-13 PROCEDURE — 3331090002 HH PPS REVENUE DEBIT

## 2020-07-13 PROCEDURE — G0152 HHCP-SERV OF OT,EA 15 MIN: HCPCS

## 2020-07-14 PROCEDURE — 3331090002 HH PPS REVENUE DEBIT

## 2020-07-14 PROCEDURE — 3331090001 HH PPS REVENUE CREDIT

## 2020-07-15 VITALS
TEMPERATURE: 97.9 F | OXYGEN SATURATION: 98 % | HEART RATE: 73 BPM | DIASTOLIC BLOOD PRESSURE: 88 MMHG | RESPIRATION RATE: 18 BRPM | SYSTOLIC BLOOD PRESSURE: 168 MMHG

## 2020-07-15 DIAGNOSIS — I12.9 BENIGN HYPERTENSION WITH CHRONIC KIDNEY DISEASE, STAGE IV (HCC): ICD-10-CM

## 2020-07-15 DIAGNOSIS — E03.4 HYPOTHYROIDISM DUE TO ACQUIRED ATROPHY OF THYROID: ICD-10-CM

## 2020-07-15 DIAGNOSIS — K59.01 SLOW TRANSIT CONSTIPATION: ICD-10-CM

## 2020-07-15 DIAGNOSIS — I73.9 ASYMPTOMATIC PVD (PERIPHERAL VASCULAR DISEASE) (HCC): ICD-10-CM

## 2020-07-15 DIAGNOSIS — N18.4 BENIGN HYPERTENSION WITH CHRONIC KIDNEY DISEASE, STAGE IV (HCC): ICD-10-CM

## 2020-07-15 PROCEDURE — 3331090001 HH PPS REVENUE CREDIT

## 2020-07-15 PROCEDURE — 3331090002 HH PPS REVENUE DEBIT

## 2020-07-15 RX ORDER — LEVOTHYROXINE SODIUM 50 UG/1
TABLET ORAL
Qty: 117 TAB | Refills: 3 | Status: SHIPPED | OUTPATIENT
Start: 2020-07-15 | End: 2021-01-01 | Stop reason: SDUPTHER

## 2020-07-15 RX ORDER — METOPROLOL TARTRATE 25 MG/1
TABLET, FILM COATED ORAL
Qty: 45 TAB | Refills: 3 | Status: SHIPPED | OUTPATIENT
Start: 2020-07-15 | End: 2020-09-21

## 2020-07-15 RX ORDER — NORTRIPTYLINE HYDROCHLORIDE 10 MG/1
CAPSULE ORAL
Qty: 180 CAP | Refills: 3 | Status: SHIPPED | OUTPATIENT
Start: 2020-07-15 | End: 2020-09-14 | Stop reason: SDUPTHER

## 2020-07-15 RX ORDER — ASPIRIN 81 MG/1
81 TABLET ORAL DAILY
Qty: 90 TAB | Refills: 3 | Status: SHIPPED | OUTPATIENT
Start: 2020-07-15

## 2020-07-15 RX ORDER — ACETAMINOPHEN 500 MG
500 TABLET ORAL 2 TIMES DAILY
Qty: 100 TAB | Refills: 5 | Status: SHIPPED | OUTPATIENT
Start: 2020-07-15

## 2020-07-15 RX ORDER — VIT C/E/ZN/COPPR/LUTEIN/ZEAXAN 250MG-90MG
1 CAPSULE ORAL 2 TIMES DAILY WITH MEALS
Qty: 180 CAP | Refills: 3 | Status: SHIPPED | OUTPATIENT
Start: 2020-07-15 | End: 2021-01-01 | Stop reason: ALTCHOICE

## 2020-07-15 RX ORDER — SIMVASTATIN 20 MG/1
TABLET, FILM COATED ORAL
Qty: 90 TAB | Refills: 3 | Status: SHIPPED | OUTPATIENT
Start: 2020-07-15 | End: 2021-01-01 | Stop reason: SDUPTHER

## 2020-07-15 RX ORDER — CALCIUM CARBONATE 200(500)MG
1 TABLET,CHEWABLE ORAL
Qty: 180 TAB | Refills: 3 | Status: SHIPPED | OUTPATIENT
Start: 2020-07-15

## 2020-07-15 RX ORDER — POLYETHYLENE GLYCOL 3350 17 G/17G
POWDER, FOR SOLUTION ORAL
Qty: 510 G | Refills: 0 | Status: SHIPPED | OUTPATIENT
Start: 2020-07-15

## 2020-07-15 NOTE — TELEPHONE ENCOUNTER
[facility] uses [address] [place] [address], medications are sent in blister packs. Kim Him states pt takes Tums spearmint chewable in place of omperazole.

## 2020-07-15 NOTE — TELEPHONE ENCOUNTER
Returned call to Otilia Chaparro, medication list updated and sent to DR Tk Stover for refilling at [facility]. PCP: Karen Stevenson MD     Last appt: 2020   Future Appointments   Date Time Provider Bar Vick   2020  1:00 PM Tino Juares [facility]        Requested Prescriptions     Pending Prescriptions Disp Refills    simvastatin (ZOCOR) 20 mg tablet 90 Tab 3     Sig: TAKE ONE TABLET BY MOUTH NIGHTLY    nortriptyline (PAMELOR) 10 mg capsule 180 Cap 3     Sig: TAKE 1 TO 2 CAPSULES BY MOUTH AT BEDTIME FOR  SHINGLES  PAIN    levothyroxine (SYNTHROID) 50 mcg tablet 117 Tab 3     Sig: TAKE 2 TABLETS BY MOUTH ON SATURDAY AND  AND TAKE 1 TABLET THE OTHER 5 DAYS. PLEASE GIVE 30 MINUTES BEFORE BREAKFAST.  aspirin delayed-release 81 mg tablet 90 Tab 3     Sig: Take 1 Tab by mouth daily.  acetaminophen (Tylenol Extra Strength) 500 mg tablet 100 Tab 5     Sig: Take 1 Tab by mouth two (2) times a day.  docusate sodium (COLACE) 50 mg capsule 90 Cap 3     Sig: Take 1 Cap by mouth daily as needed for Constipation. 50mg as needed    polyethylene glycol (Miralax) 17 gram/dose powder 510 g 0     Si tablespoon with 8 oz of water daily as needed for constipation not relieved with Colace  Indications: constipation    metoprolol tartrate (LOPRESSOR) 25 mg tablet 45 Tab 3     Sig: TAKE ONE HALF TABLET BY MOUTH ONCE NIGHTLY FOR HYPERTENSION    vit C,P-Mj-opgai-lutein-zeaxan (PreserVision AREDS-2) [id number] mg-unit-mg-mg cap capsule 180 Cap 3     Sig: Take 1 Cap by mouth two (2) times daily (with meals).  calcium carbonate (Tums) 200 mg calcium (500 mg) chew 180 Tab 3     Sig: Take 1 Tab by mouth two (2) times daily as needed.

## 2020-07-15 NOTE — TELEPHONE ENCOUNTER
Pt's granddaughter called and requested a call back at [phone] to discuss the pt's need for help in the evening/weekends. The family has not been able to have someone else come stay with her and they are anticipating that she will need to move to a home at this time. They need to find out what to do about rx changes and orders for OTC meds (vitamins as well).

## 2020-07-16 ENCOUNTER — HOME CARE VISIT (OUTPATIENT)
Dept: HOME HEALTH SERVICES | Facility: HOME HEALTH | Age: 85
End: 2020-07-16
Payer: MEDICARE

## 2020-07-16 ENCOUNTER — HOME CARE VISIT (OUTPATIENT)
Dept: SCHEDULING | Facility: HOME HEALTH | Age: 85
End: 2020-07-16
Payer: MEDICARE

## 2020-07-16 VITALS — TEMPERATURE: 98.5 F | SYSTOLIC BLOOD PRESSURE: 106 MMHG | DIASTOLIC BLOOD PRESSURE: 60 MMHG

## 2020-07-16 PROCEDURE — 3331090002 HH PPS REVENUE DEBIT

## 2020-07-16 PROCEDURE — 3331090001 HH PPS REVENUE CREDIT

## 2020-07-16 PROCEDURE — G0152 HHCP-SERV OF OT,EA 15 MIN: HCPCS

## 2020-09-01 DIAGNOSIS — M25.551 RIGHT HIP PAIN: Primary | ICD-10-CM

## 2020-09-02 ENCOUNTER — HOSPITAL ENCOUNTER (OUTPATIENT)
Dept: GENERAL RADIOLOGY | Age: 85
Discharge: HOME | End: 2020-09-02
Payer: MEDICARE

## 2020-09-02 ENCOUNTER — OFFICE VISIT (OUTPATIENT)
Dept: ORTHOPEDIC SURGERY | Age: 85
End: 2020-09-02
Payer: MEDICARE

## 2020-09-02 VITALS
HEART RATE: 95 BPM | TEMPERATURE: 97.7 F | HEIGHT: 67 IN | OXYGEN SATURATION: 97 % | DIASTOLIC BLOOD PRESSURE: 75 MMHG | WEIGHT: 126 LBS | SYSTOLIC BLOOD PRESSURE: 139 MMHG | BODY MASS INDEX: 19.78 KG/M2

## 2020-09-02 DIAGNOSIS — S72.91XD ORTHOPEDIC AFTERCARE FOR HEALING TRAUMATIC FRACTURE OF RIGHT UPPER LEG: ICD-10-CM

## 2020-09-02 DIAGNOSIS — M25.551 RIGHT HIP PAIN: ICD-10-CM

## 2020-09-02 DIAGNOSIS — S92.412A CLOSED FRACTURE OF PROXIMAL PHALANX OF LEFT GREAT TOE, INITIAL ENCOUNTER: Primary | ICD-10-CM

## 2020-09-02 PROCEDURE — 99213 OFFICE O/P EST LOW 20 MIN: CPT | Performed by: ORTHOPAEDIC SURGERY

## 2020-09-02 PROCEDURE — 73502 X-RAY EXAM HIP UNI 2-3 VIEWS: CPT

## 2020-09-02 NOTE — PROGRESS NOTES
Identified pt with two pt identifiers (name and ). Reviewed chart in preparation for visit and have obtained necessary documentation. Chayo Coburn is a 80 y.o. female Chief Complaint Patient presents with  
 Hip Pain RT hip Visit Vitals /75 (BP 1 Location: Right arm, BP Patient Position: Sitting) Pulse 95 Temp 97.7 °F (36.5 °C) (Tympanic) Ht 5' 7\" (1.702 m) Wt 126 lb (57.2 kg) SpO2 97% BMI 19.73 kg/m² 1. Have you been to the ER, urgent care clinic since your last visit? Hospitalized since your last visit? No 
 
2. Have you seen or consulted any other health care providers outside of the [address] [place] [address] since your last visit? Include any pap smears or colon screening. No 
 
 
Pt did have a slip up at home and has fallen 4 times since going home in May, 2020.

## 2020-09-03 NOTE — PROGRESS NOTES
9/2/2020 CC: right hip pain HPI:      This is a 8 y.o. year old female who presents for a follow up visit. The patient was last seen and diagnosed with right hip fracture, healed, but she has had multiple mechanical falls recently. The patient's treatments since the most recent visit have comprised of caregiver. The patient has had good relief of the chief complaint. She still has some pain, but worse since her recent falls PMH: 
Past Medical History:  
Diagnosis Date  Chronic kidney disease  CKD (chronic kidney disease) stage 3, GFR 30-59 ml/min ([facility]) 5/17/2010  Hypercholesteremia  Hypertension  Post herpetic neuralgia  Stroke ([facility] [address]  Thyroid disease PSxHx: 
Past Surgical History:  
Procedure Laterality Date  HX APPENDECTOMY  HX HYSTERECTOMY  1947  
 one ovary out Meds: 
 
Current Outpatient Medications:  
  simvastatin (ZOCOR) 20 mg tablet, TAKE ONE TABLET BY MOUTH NIGHTLY, Disp: 90 Tab, Rfl: 3 
  nortriptyline (PAMELOR) 10 mg capsule, TAKE 1 TO 2 CAPSULES BY MOUTH AT BEDTIME FOR  SHINGLES  PAIN, Disp: 180 Cap, Rfl: 3 
  levothyroxine (SYNTHROID) 50 mcg tablet, TAKE 2 TABLETS BY MOUTH ON SATURDAY AND SUNDAY AND TAKE 1 TABLET THE OTHER 5 DAYS. PLEASE GIVE 30 MINUTES BEFORE BREAKFAST., Disp: 117 Tab, Rfl: 3 
  aspirin delayed-release 81 mg tablet, Take 1 Tab by mouth daily. , Disp: 90 Tab, Rfl: 3 
  acetaminophen (Tylenol Extra Strength) 500 mg tablet, Take 1 Tab by mouth two (2) times a day., Disp: 100 Tab, Rfl: 5 
  docusate sodium (COLACE) 50 mg capsule, Take 1 Cap by mouth daily as needed for Constipation.  50mg as needed, Disp: 90 Cap, Rfl: 3 
  polyethylene glycol (Miralax) 17 gram/dose powder, 1 tablespoon with 8 oz of water daily as needed for constipation not relieved with Colace  Indications: constipation, Disp: 510 g, Rfl: 0 
  metoprolol tartrate (LOPRESSOR) 25 mg tablet, TAKE ONE HALF TABLET BY MOUTH ONCE NIGHTLY FOR HYPERTENSION, Disp: 45 Tab, Rfl: 3   vit C,T-Ef-csgdn-lutein-zeaxan (PreserVision AREDS-2) 915-657-92-1 mg-unit-mg-mg cap capsule, Take 1 Cap by mouth two (2) times daily (with meals). , Disp: 180 Cap, Rfl: 3 
  calcium carbonate (Tums) 200 mg calcium (500 mg) chew, Take 1 Tab by mouth two (2) times daily as needed (heartburn or dyspepsia). Peppermint flavored, Disp: 180 Tab, Rfl: 3 
  omeprazole (PRILOSEC) 20 mg capsule, Take 1 tablet po with water at least 30 min before breakfast, Disp: 90 Cap, Rfl: 3 All: Allergies Allergen Reactions  Lyrica [Pregabalin] Other (comments) Couldn't function  Macrobid [Nitrofurantoin Monohyd/M-Cryst] Other (comments)  
  sick  Neurontin [Gabapentin] Other (comments) groggy Social Hx: 
Social History Socioeconomic History  Marital status:  Spouse name: Not on file  Number of children: Not on file  Years of education: Not on file  Highest education level: Not on file Tobacco Use  Smoking status: Never Smoker  Smokeless tobacco: Never Used Substance and Sexual Activity  Alcohol use: No  
 Drug use: No  
 
 
Family Hx: 
Family History Problem Relation Age of Onset  Cancer Father Review of Systems:  
 
 
General: Denies headache, lethargy, fever, weight loss Ears/Nose/Throat: Denies ear discharge, drainage, nosebleeds, hoarse voice, dental problems Cardiovascular: Denies chest pain, shortness of breath Lungs: Denies chest pain, breathing problems, wheezing, pneumonia Stomach: Denies stomach pain, heartburn, constipation, irritable bowel Skin: Denies rash, sores, open wounds Musculoskeletal: right hip pain Genitourinary: Denies dysuria, hematuria, polyuria Gastrointestinal: Denies constipation, obstipation, diarrhea Neurological: Denies changes in sight, smell, hearing, taste, seizures. Denies loss of consciousness. Psychiatric: Denies depression, sleep pattern changes, anxiety, change in personality Endocrine: Denies mood swings, heat or cold intolerance Hematologic/Lymphatic: Denies anemia, purpura, petechia Allergic/Immunologic: Denies swelling of throat, pain or swelling at lymph nodes Physical Examination: 
 
Visit Vitals /75 (BP 1 Location: Right arm, BP Patient Position: Sitting) Pulse 95 Temp 97.7 °F (36.5 °C) (Tympanic) Ht 5' 7\" (1.702 m) Wt 126 lb (57.2 kg) SpO2 97% BMI 19.73 kg/m² General: AOX3, no apparent distress Psychiatric: mood and affect appropriate Lungs: breathing is symmetric and unlabored bilaterally Heart: regular rate and rhythm Abdomen: no guarding Head: normocephalic, atraumatic Skin: No significant abnormalities, good turgor Sensation intact to light touch: C5-T1 dermatomes Muscular exam: 5/5 strength in all major muscle groups unless noted in specialty exam. 
 
Extremities Right upper extremity: no exam 
Left upper extremity:  No exam 
Right lower extremity: No gross deformity. No restriction to range of motion of the hip, knee, ankle. Muscle bulk is appropriate without wasting. Sensation is intact to light touch in the L1-S1 dermatomes. Capillary refill is less than 2 seconds in the fingers. Strength testing is 5/5 at the major muscle groups of the hip, knee, ankle. Left lower extremity:  No gross deformity. No restriction to range of motion of the hip, knee, ankle. Muscle bulk is appropriate without wasting. Sensation is intact to light touch in the L1-S1 dermatomes. Capillary refill is less than 2 seconds in the fingers. Strength testing is 5/5 at the major muscle groups of the hip, knee, ankle. Diagnostics: 
 
Pertinent Diagnostics: Xrays of the right hip are taken with fracture in anatomic alignment with stable internal fixation. No evidence of hardware complication. Assessment: status post right hip fracture fixation, healed, contusion right hip Plan: As there is no mechanical failure of right hip fixation or fracture healing, we will continue close follow up. I emphasized pain control, use of ambulatory walker aid, and use of caregivers for safety. She stated her understanding and satisfaction. Ms. Pham Cerna has a reminder for a \"due or due soon\" health maintenance. I have asked that she contact her primary care provider for follow-up on this health maintenance.

## 2020-09-14 NOTE — TELEPHONE ENCOUNTER
PCP: Nathan Cornelius MD     Last appt: Visit date not found   No future appointments.      Requested Prescriptions     Pending Prescriptions Disp Refills    nortriptyline (PAMELOR) 10 mg capsule 180 Cap 3     Sig: TAKE 1 TO 2 CAPSULES BY MOUTH AT BEDTIME FOR  SHINGLES  PAIN

## 2020-09-14 NOTE — TELEPHONE ENCOUNTER
[facility] on file sent a fax requesting a refill of   Requested Prescriptions     Pending Prescriptions Disp Refills    nortriptyline (PAMELOR) 10 mg capsule 180 Cap 3     Sig: TAKE 1 TO 2 CAPSULES BY MOUTH AT BEDTIME FOR  SHINGLES  PAIN

## 2020-09-15 RX ORDER — NORTRIPTYLINE HYDROCHLORIDE 10 MG/1
CAPSULE ORAL
Qty: 180 CAP | Refills: 0 | Status: SHIPPED | OUTPATIENT
Start: 2020-09-15 | End: 2021-01-01 | Stop reason: SDUPTHER

## 2020-09-19 ENCOUNTER — HOSPITAL ENCOUNTER (INPATIENT)
Age: 85
LOS: 1 days | Discharge: HOME HEALTH | DRG: 690 | End: 2020-09-21
Attending: EMERGENCY MEDICINE | Admitting: INTERNAL MEDICINE
Payer: MEDICARE

## 2020-09-19 ENCOUNTER — APPOINTMENT (OUTPATIENT)
Dept: GENERAL RADIOLOGY | Age: 85
DRG: 690 | End: 2020-09-19
Attending: EMERGENCY MEDICINE
Payer: MEDICARE

## 2020-09-19 DIAGNOSIS — R77.8 ELEVATED TROPONIN: ICD-10-CM

## 2020-09-19 DIAGNOSIS — R31.9 URINARY TRACT INFECTION WITH HEMATURIA, SITE UNSPECIFIED: Primary | ICD-10-CM

## 2020-09-19 DIAGNOSIS — R05.9 COUGH: ICD-10-CM

## 2020-09-19 DIAGNOSIS — N39.0 URINARY TRACT INFECTION WITH HEMATURIA, SITE UNSPECIFIED: Primary | ICD-10-CM

## 2020-09-19 LAB
ALBUMIN SERPL-MCNC: 2.8 G/DL (ref 3.5–5)
ALBUMIN/GLOB SERPL: 0.7 {RATIO} (ref 1.1–2.2)
ALP SERPL-CCNC: 86 U/L (ref 45–117)
ALT SERPL-CCNC: 20 U/L (ref 12–78)
ANION GAP SERPL CALC-SCNC: 6 MMOL/L (ref 5–15)
APPEARANCE UR: ABNORMAL
AST SERPL-CCNC: 26 U/L (ref 15–37)
BACTERIA URNS QL MICRO: ABNORMAL /HPF
BASOPHILS # BLD: 0 K/UL (ref 0–0.1)
BASOPHILS NFR BLD: 0 % (ref 0–1)
BILIRUB SERPL-MCNC: 0.3 MG/DL (ref 0.2–1)
BILIRUB UR QL: NEGATIVE
BNP SERPL-MCNC: 7426 PG/ML
BUN SERPL-MCNC: 26 MG/DL (ref 6–20)
BUN/CREAT SERPL: 20 (ref 12–20)
CALCIUM SERPL-MCNC: 8.4 MG/DL (ref 8.5–10.1)
CHLORIDE SERPL-SCNC: 103 MMOL/L (ref 97–108)
CK MB CFR SERPL CALC: 2.8 % (ref 0–2.5)
CK MB SERPL-MCNC: 1.2 NG/ML (ref 5–25)
CK SERPL-CCNC: 43 U/L (ref 26–192)
CO2 SERPL-SCNC: 28 MMOL/L (ref 21–32)
COLOR UR: ABNORMAL
COVID-19 RAPID TEST, COVR: NOT DETECTED
CREAT SERPL-MCNC: 1.32 MG/DL (ref 0.55–1.02)
DIFFERENTIAL METHOD BLD: ABNORMAL
EOSINOPHIL # BLD: 0 K/UL (ref 0–0.4)
EOSINOPHIL NFR BLD: 1 % (ref 0–7)
EPITH CASTS URNS QL MICRO: ABNORMAL /LPF
ERYTHROCYTE [DISTWIDTH] IN BLOOD BY AUTOMATED COUNT: 14.4 % (ref 11.5–14.5)
GLOBULIN SER CALC-MCNC: 3.8 G/DL (ref 2–4)
GLUCOSE SERPL-MCNC: 103 MG/DL (ref 65–100)
GLUCOSE UR STRIP.AUTO-MCNC: NEGATIVE MG/DL
HCT VFR BLD AUTO: 39.3 % (ref 35–47)
HEALTH STATUS, XMCV2T: NORMAL
HGB BLD-MCNC: 12.6 G/DL (ref 11.5–16)
HGB UR QL STRIP: NEGATIVE
IMM GRANULOCYTES # BLD AUTO: 0 K/UL (ref 0–0.04)
IMM GRANULOCYTES NFR BLD AUTO: 0 % (ref 0–0.5)
KETONES UR QL STRIP.AUTO: 15 MG/DL
LACTATE BLD-SCNC: 1.46 MMOL/L (ref 0.4–2)
LEUKOCYTE ESTERASE UR QL STRIP.AUTO: ABNORMAL
LYMPHOCYTES # BLD: 0.8 K/UL (ref 0.8–3.5)
LYMPHOCYTES NFR BLD: 23 % (ref 12–49)
MCH RBC QN AUTO: 30.6 PG (ref 26–34)
MCHC RBC AUTO-ENTMCNC: 32.1 G/DL (ref 30–36.5)
MCV RBC AUTO: 95.4 FL (ref 80–99)
MONOCYTES # BLD: 0.5 K/UL (ref 0–1)
MONOCYTES NFR BLD: 14 % (ref 5–13)
NEUTS SEG # BLD: 2.1 K/UL (ref 1.8–8)
NEUTS SEG NFR BLD: 62 % (ref 32–75)
NITRITE UR QL STRIP.AUTO: POSITIVE
NRBC # BLD: 0 K/UL (ref 0–0.01)
NRBC BLD-RTO: 0 PER 100 WBC
PH UR STRIP: 5.5 [PH] (ref 5–8)
PLATELET # BLD AUTO: 159 K/UL (ref 150–400)
PMV BLD AUTO: 10.3 FL (ref 8.9–12.9)
POTASSIUM SERPL-SCNC: 3.7 MMOL/L (ref 3.5–5.1)
PROT SERPL-MCNC: 6.6 G/DL (ref 6.4–8.2)
PROT UR STRIP-MCNC: 100 MG/DL
RBC # BLD AUTO: 4.12 M/UL (ref 3.8–5.2)
RBC #/AREA URNS HPF: ABNORMAL /HPF (ref 0–5)
SODIUM SERPL-SCNC: 137 MMOL/L (ref 136–145)
SOURCE, COVRS: NORMAL
SP GR UR REFRACTOMETRY: 1.02 (ref 1–1.03)
SPECIMEN SOURCE, FCOV2M: NORMAL
SPECIMEN TYPE, XMCV1T: NORMAL
TROPONIN I SERPL-MCNC: 0.06 NG/ML
TROPONIN I SERPL-MCNC: 0.07 NG/ML
UA: UC IF INDICATED,UAUC: ABNORMAL
UROBILINOGEN UR QL STRIP.AUTO: 1 EU/DL (ref 0.2–1)
WBC # BLD AUTO: 3.5 K/UL (ref 3.6–11)
WBC URNS QL MICRO: ABNORMAL /HPF (ref 0–4)

## 2020-09-19 PROCEDURE — 99285 EMERGENCY DEPT VISIT HI MDM: CPT

## 2020-09-19 PROCEDURE — 65270000029 HC RM PRIVATE

## 2020-09-19 PROCEDURE — 71045 X-RAY EXAM CHEST 1 VIEW: CPT

## 2020-09-19 PROCEDURE — 82550 ASSAY OF CK (CPK): CPT

## 2020-09-19 PROCEDURE — 96365 THER/PROPH/DIAG IV INF INIT: CPT

## 2020-09-19 PROCEDURE — 84484 ASSAY OF TROPONIN QUANT: CPT

## 2020-09-19 PROCEDURE — 74011000258 HC RX REV CODE- 258: Performed by: HOSPITALIST

## 2020-09-19 PROCEDURE — 96372 THER/PROPH/DIAG INJ SC/IM: CPT

## 2020-09-19 PROCEDURE — 93005 ELECTROCARDIOGRAM TRACING: CPT

## 2020-09-19 PROCEDURE — 83605 ASSAY OF LACTIC ACID: CPT

## 2020-09-19 PROCEDURE — 87077 CULTURE AEROBIC IDENTIFY: CPT

## 2020-09-19 PROCEDURE — 87635 SARS-COV-2 COVID-19 AMP PRB: CPT

## 2020-09-19 PROCEDURE — 74011250637 HC RX REV CODE- 250/637: Performed by: HOSPITALIST

## 2020-09-19 PROCEDURE — 74011250636 HC RX REV CODE- 250/636: Performed by: HOSPITALIST

## 2020-09-19 PROCEDURE — 81001 URINALYSIS AUTO W/SCOPE: CPT

## 2020-09-19 PROCEDURE — 87186 SC STD MICRODIL/AGAR DIL: CPT

## 2020-09-19 PROCEDURE — 87086 URINE CULTURE/COLONY COUNT: CPT

## 2020-09-19 PROCEDURE — 36415 COLL VENOUS BLD VENIPUNCTURE: CPT

## 2020-09-19 PROCEDURE — 80053 COMPREHEN METABOLIC PANEL: CPT

## 2020-09-19 PROCEDURE — 85025 COMPLETE CBC W/AUTO DIFF WBC: CPT

## 2020-09-19 PROCEDURE — 83880 ASSAY OF NATRIURETIC PEPTIDE: CPT

## 2020-09-19 RX ORDER — ATORVASTATIN CALCIUM 10 MG/1
10 TABLET, FILM COATED ORAL
Status: DISCONTINUED | OUTPATIENT
Start: 2020-09-19 | End: 2020-09-21 | Stop reason: HOSPADM

## 2020-09-19 RX ORDER — SODIUM CHLORIDE 0.9 % (FLUSH) 0.9 %
5-40 SYRINGE (ML) INJECTION AS NEEDED
Status: DISCONTINUED | OUTPATIENT
Start: 2020-09-19 | End: 2020-09-19

## 2020-09-19 RX ORDER — SODIUM CHLORIDE 0.9 % (FLUSH) 0.9 %
5-40 SYRINGE (ML) INJECTION EVERY 8 HOURS
Status: DISCONTINUED | OUTPATIENT
Start: 2020-09-19 | End: 2020-09-21 | Stop reason: HOSPADM

## 2020-09-19 RX ORDER — ASPIRIN 81 MG/1
81 TABLET ORAL DAILY
Status: DISCONTINUED | OUTPATIENT
Start: 2020-09-20 | End: 2020-09-21 | Stop reason: HOSPADM

## 2020-09-19 RX ORDER — ONDANSETRON 2 MG/ML
4 INJECTION INTRAMUSCULAR; INTRAVENOUS
Status: DISCONTINUED | OUTPATIENT
Start: 2020-09-19 | End: 2020-09-21 | Stop reason: HOSPADM

## 2020-09-19 RX ORDER — LEVOTHYROXINE SODIUM 50 UG/1
50 TABLET ORAL
Status: DISCONTINUED | OUTPATIENT
Start: 2020-09-21 | End: 2020-09-21 | Stop reason: HOSPADM

## 2020-09-19 RX ORDER — HEPARIN SODIUM 5000 [USP'U]/ML
5000 INJECTION, SOLUTION INTRAVENOUS; SUBCUTANEOUS EVERY 12 HOURS
Status: DISCONTINUED | OUTPATIENT
Start: 2020-09-19 | End: 2020-09-21 | Stop reason: HOSPADM

## 2020-09-19 RX ORDER — POLYETHYLENE GLYCOL 3350 17 G/17G
17 POWDER, FOR SOLUTION ORAL DAILY PRN
Status: DISCONTINUED | OUTPATIENT
Start: 2020-09-19 | End: 2020-09-21 | Stop reason: HOSPADM

## 2020-09-19 RX ORDER — NORTRIPTYLINE HYDROCHLORIDE 10 MG/1
20 CAPSULE ORAL
Status: DISCONTINUED | OUTPATIENT
Start: 2020-09-19 | End: 2020-09-21 | Stop reason: HOSPADM

## 2020-09-19 RX ORDER — LEVOTHYROXINE SODIUM 100 UG/1
100 TABLET ORAL
Status: DISCONTINUED | OUTPATIENT
Start: 2020-09-20 | End: 2020-09-21 | Stop reason: HOSPADM

## 2020-09-19 RX ORDER — SODIUM CHLORIDE 0.9 % (FLUSH) 0.9 %
5-40 SYRINGE (ML) INJECTION AS NEEDED
Status: DISCONTINUED | OUTPATIENT
Start: 2020-09-19 | End: 2020-09-21 | Stop reason: HOSPADM

## 2020-09-19 RX ORDER — ACETAMINOPHEN 325 MG/1
650 TABLET ORAL
Status: DISCONTINUED | OUTPATIENT
Start: 2020-09-19 | End: 2020-09-21 | Stop reason: HOSPADM

## 2020-09-19 RX ORDER — METOPROLOL TARTRATE 25 MG/1
12.5 TABLET, FILM COATED ORAL
Status: DISCONTINUED | OUTPATIENT
Start: 2020-09-19 | End: 2020-09-20

## 2020-09-19 RX ORDER — ONDANSETRON 2 MG/ML
4 INJECTION INTRAMUSCULAR; INTRAVENOUS
Status: DISCONTINUED | OUTPATIENT
Start: 2020-09-19 | End: 2020-09-19

## 2020-09-19 RX ORDER — ACETAMINOPHEN 500 MG
500 TABLET ORAL 2 TIMES DAILY
Status: DISCONTINUED | OUTPATIENT
Start: 2020-09-19 | End: 2020-09-21 | Stop reason: HOSPADM

## 2020-09-19 RX ADMIN — SODIUM CHLORIDE 10 ML: 9 INJECTION, SOLUTION INTRAMUSCULAR; INTRAVENOUS; SUBCUTANEOUS at 17:58

## 2020-09-19 RX ADMIN — HEPARIN SODIUM 5000 UNITS: 5000 INJECTION INTRAVENOUS; SUBCUTANEOUS at 17:58

## 2020-09-19 RX ADMIN — ACETAMINOPHEN 500 MG: 500 TABLET ORAL at 17:58

## 2020-09-19 RX ADMIN — SODIUM CHLORIDE 10 ML: 9 INJECTION, SOLUTION INTRAMUSCULAR; INTRAVENOUS; SUBCUTANEOUS at 22:00

## 2020-09-19 RX ADMIN — METOPROLOL TARTRATE 12.5 MG: 25 TABLET, FILM COATED ORAL at 22:00

## 2020-09-19 RX ADMIN — ATORVASTATIN CALCIUM 10 MG: 10 TABLET, FILM COATED ORAL at 22:00

## 2020-09-19 RX ADMIN — SODIUM CHLORIDE 10 ML: 9 INJECTION, SOLUTION INTRAMUSCULAR; INTRAVENOUS; SUBCUTANEOUS at 17:59

## 2020-09-19 RX ADMIN — CEFTRIAXONE 1 G: 1 INJECTION, POWDER, FOR SOLUTION INTRAMUSCULAR; INTRAVENOUS at 16:43

## 2020-09-19 NOTE — H&P
Hospitalist Admission Note NAME: Blayne Escoto :  1912 MRN:  [id number] Date/Time:  2020 4:09 PM 
 
Patient PCP: Moon Dailey MD 
 
Please note that this dictation was completed with [employer], the computer voice recognition software. Quite often unanticipated grammatical, syntax, homophones, and other interpretive errors are inadvertently transcribed by the computer software. Please disregard these errors. Please excuse any errors that have escaped final proofreading 
______________________________________________________________________ Assessment & Plan: 
UTI, POA 
--continue rocephin, follow up on urine culture. Not septic Cough Chest pain SOB Elevated troponin 0.07 Generalized weakness 
--ekg with Qs in septal leads suggests prior infarct, no acute ischemic changes --put on aspirin, follow troponin, check echo 
--rapid SARS-COV-2 test pending 
--consult pt/ot HTN 
--continue toprol Hyperlipidemia 
--continue lipitor Hypothyroid --continue synthroid check tsh 
 
CKD stage 3 
--Cr stable GERD 
--maalox prn Hx right hip fracture s/p repair 3/2020 
--uses walker and requires supervision when walking 
--fall risk Macular degeneration with severe impaired vision 
--continue preservision Very hard of hearing Body mass index is 19.75 kg/m². Code: DNR/DNI. She initially said she would want CPR if her heart stopped but when we discussed that could break her ribs and she could sustain anoxic brain injury, she said she would want to be DNR/DNI. DVT prophylaxis: heparin SQ Surrogate decision maker:  SyedaA granddaughter Adan [place] cell [phone] Subjective: CHIEF COMPLAINT:  Generalized weakness, cough HISTORY OF PRESENT ILLNESS:    
Blayne Escoto is a 80 y.o. female PMH HTN, hyperlipidemia, CKD 3, hypothyroid presents with above reasons.   History from patient difficult due to severe hearing loss. Also additional history from granddaughter Martha Jimenez over phone. Not feeling well for 1 week with generalized weakness, loss of appetite, cough with white sputum, nasal drainage, chest congestion, SOB, intermittent sharp mid sternal chest pain, occasional nausea, no vomiting. Patient been saying she wants to go to ER but then changes her mind for past 4 days. Today granddaughter decided to call ambulance. Lives in her home with 24 hour caregivers. One caregiver got a cold recently but granddaughter substituted her out. At baseline, walks with walker and supervision and some memory loss, disorientation with time. We were asked to admit for work up and evaluation of the above problems. Past Medical History:  
Diagnosis Date  Chronic kidney disease  CKD (chronic kidney disease) stage 3, GFR 30-59 ml/min ([facility]) 5/17/2010  Hypercholesteremia  Hypertension  Post herpetic neuralgia  Stroke ([facility] [address]  Thyroid disease Past Surgical History:  
Procedure Laterality Date  HX APPENDECTOMY  HX HYSTERECTOMY  1947  
 one ovary out Social History Tobacco Use  Smoking status: Never Smoker  Smokeless tobacco: Never Used Substance Use Topics  Alcohol use: No  
  
Drug use:   
 
 
Family History Problem Relation Age of Onset  Cancer Father Allergies Allergen Reactions  Lyrica [Pregabalin] Other (comments) Couldn't function  Macrobid [Nitrofurantoin Monohyd/M-Cryst] Other (comments)  
  sick  Neurontin [Gabapentin] Other (comments) groggy Prior to Admission medications Medication Sig Start Date End Date Taking?  Authorizing Provider  
nortriptyline (PAMELOR) 10 mg capsule TAKE 1 TO 2 CAPSULES BY MOUTH AT BEDTIME FOR  SHINGLES  PAIN 9/15/20   Nubia Wagoner MD  
simvastatin (ZOCOR) 20 mg tablet TAKE ONE TABLET BY MOUTH NIGHTLY 7/15/20   Ade Amaral MD  
 levothyroxine (SYNTHROID) 50 mcg tablet TAKE 2 TABLETS BY MOUTH ON SATURDAY AND SUNDAY AND TAKE 1 TABLET THE OTHER 5 DAYS. PLEASE GIVE 30 MINUTES BEFORE BREAKFAST. 7/15/20   Deedee Vicente MD  
aspirin delayed-release 81 mg tablet Take 1 Tab by mouth daily. 7/15/20   Deedee Vicente MD  
acetaminophen (Tylenol Extra Strength) 500 mg tablet Take 1 Tab by mouth two (2) times a day. 7/15/20   Deedee Vicente MD  
docusate sodium (COLACE) 50 mg capsule Take 1 Cap by mouth daily as needed for Constipation. 50mg as needed 7/15/20   Deedee Vicente MD  
polyethylene glycol (Miralax) 17 gram/dose powder 1 tablespoon with 8 oz of water daily as needed for constipation not relieved with Colace  Indications: constipation 7/15/20   Deedee Vicente MD  
metoprolol tartrate (LOPRESSOR) 25 mg tablet TAKE ONE HALF TABLET BY MOUTH ONCE NIGHTLY FOR HYPERTENSION 7/15/20   Deedee Vicente MD  
vit C,S-Sx-lcgks-lutein-zeaxan (PreserVision AREDS-2) 592-309-34-9 mg-unit-mg-mg cap capsule Take 1 Cap by mouth two (2) times daily (with meals). 7/15/20   Deedee Vicente MD  
calcium carbonate (Tums) 200 mg calcium (500 mg) chew Take 1 Tab by mouth two (2) times daily as needed (heartburn or dyspepsia). Peppermint flavored 7/15/20   Deedee Vicente MD  
omeprazole (PRILOSEC) 20 mg capsule Take 1 tablet po with water at least 30 min before breakfast 7/4/20   Deedee Vicente MD  
 
REVIEW OF SYSTEMS:  POSITIVE= Bold. Negative = normal text General:  fever, chills, sweats, generalized weakness, weight loss/gain, loss of appetite Eyes:  blurred vision, eye pain, loss of vision, diplopia Ear Nose and Throat:  rhinorrhea, pharyngitis Respiratory:   cough, sputum production, SOB, wheezing, SMITH, pleuritic pain 
Cardiology:  chest pain, palpitations, orthopnea, PND, edema, syncope Gastrointestinal:  abdominal pain, Nausea/V, dysphagia, diarrhea, constipation, bleeding Genitourinary:  frequency, urgency, dysuria, hematuria, incontinence Muskuloskeletal :  arthralgia, myalgia Hematology:  easy bruising, bleeding, lymphadenopathy Dermatological:  rash, ulceration, pruritis Endocrine:  hot flashes or polydipsia Neurological:  headache, dizziness, confusion, focal weakness, paresthesia, memory loss, gait disturbance Psychological: anxiety, depression, agitation Objective: VITALS:   
Visit Vitals BP (!) 141/69 (BP 1 Location: Right arm, BP Patient Position: At rest) Pulse 86 Temp 98.6 °F (37 °C) Resp 19 Ht 5' 7\" (1.702 m) Wt 57.2 kg (126 lb 1.7 oz) SpO2 97% BMI 19.75 kg/m² Temp (24hrs), Av.6 °F (37 °C), Min:98.6 °F (37 °C), Max:98.6 °F (37 °C) Body mass index is 19.75 kg/m². PHYSICAL EXAM: 
 
General:    Alert, cooperative, no distress, appears stated age. HEENT: Atraumatic, anicteric sclerae, conjunctival redness b/l without   purulent discharge. No oral ulcers, mucosa dry, throat clear. Very hard of hearing Neck:  Supple, symmetrical,  thyroid: non tender Lungs:   Clear to auscultation bilaterally. No Wheezing or Rhonchi. No rales. Chest wall:  No tenderness  No Accessory muscle use. Heart:   irregular  rhythm,  No  murmur   No gallop. No edema. Abdomen:   Soft, non-tender. Not distended. Bowel sounds normal. No masses Extremities: No cyanosis. No clubbing Skin:     Not pale Not Jaundiced  No rashes Psych:  Fair insight. Not depressed. Not anxious or agitated. Neurologic: EOMs intact. No facial asymmetry. No aphasia or slurred speech. Symmetrical strength arms 5/5. Right leg weaker 4/5. Left leg 5/5, Alert and oriented X self, situation, month, says year is . IMAGING RESULTS: 
 []       I have personally reviewed the actual   []     CXR  []     CT scan CXR: elevated left hemidiaphragm. Mild chronic interstitial markings. No infiltrate or pulmonary edema.  
CT : 
EKG: 
 ________________________________________________________________________ Care Plan discussed with: 
  Comments Patient y Family  y Granddaughter Ephriam Lesches RN Care Manager Consultant:     
________________________________________________________________________ Prophylaxis: 
GI none DVT heparin  
________________________________________________________________________ Recommended Disposition:  
Home with Family y HH/PT/OT/RN ?  
SNF/LTC   
LILI   
________________________________________________________________________ Code Status: 
Full Code DNR/DNI   
________________________________________________________________________ TOTAL TIME: 60 minutes Comments  
 y Reviewed previous records  
>50% of visit spent in counseling and coordination of care  Discussion with patient and/or family and questions answered 
  
 
 
______________________________________________________________________ Vinh Rodriguez MD 
 
 
Procedures: see electronic medical records for all procedures/Xrays and details which were not copied into this note but were reviewed prior to creation of Plan. LAB DATA REVIEWED:   
Recent Results (from the past 24 hour(s)) CBC WITH AUTOMATED DIFF Collection Time: 09/19/20  1:53 PM  
Result Value Ref Range WBC 3.5 (L) 3.6 - 11.0 K/uL  
 RBC 4.12 3.80 - 5.20 M/uL  
 HGB 12.6 11.5 - 16.0 g/dL HCT 39.3 35.0 - 47.0 % MCV 95.4 80.0 - 99.0 FL  
 MCH 30.6 26.0 - 34.0 PG  
 MCHC 32.1 30.0 - 36.5 g/dL  
 RDW 14.4 11.5 - 14.5 % PLATELET 108 485 - 873 K/uL MPV 10.3 8.9 - 12.9 FL  
 NRBC 0.0 0  WBC ABSOLUTE NRBC 0.00 0.00 - 0.01 K/uL NEUTROPHILS 62 32 - 75 % LYMPHOCYTES 23 12 - 49 % MONOCYTES 14 (H) 5 - 13 % EOSINOPHILS 1 0 - 7 % BASOPHILS 0 0 - 1 % IMMATURE GRANULOCYTES 0 0.0 - 0.5 % ABS. NEUTROPHILS 2.1 1.8 - 8.0 K/UL  
 ABS. LYMPHOCYTES 0.8 0.8 - 3.5 K/UL  
 ABS. MONOCYTES 0.5 0.0 - 1.0 K/UL ABS. EOSINOPHILS 0.0 0.0 - 0.4 K/UL  
 ABS. BASOPHILS 0.0 0.0 - 0.1 K/UL  
 ABS. IMM. GRANS. 0.0 0.00 - 0.04 K/UL  
 DF AUTOMATED METABOLIC PANEL, COMPREHENSIVE Collection Time: 09/19/20  1:53 PM  
Result Value Ref Range Sodium 137 136 - 145 mmol/L Potassium 3.7 3.5 - 5.1 mmol/L Chloride 103 97 - 108 mmol/L  
 CO2 28 21 - 32 mmol/L Anion gap 6 5 - 15 mmol/L Glucose 103 (H) 65 - 100 mg/dL BUN 26 (H) 6 - 20 MG/DL Creatinine 1.32 (H) 0.55 - 1.02 MG/DL  
 BUN/Creatinine ratio 20 12 - 20 GFR est AA 44 (L) >60 ml/min/1.73m2 GFR est non-AA 36 (L) >60 ml/min/1.73m2 Calcium 8.4 (L) 8.5 - 10.1 MG/DL Bilirubin, total 0.3 0.2 - 1.0 MG/DL  
 ALT (SGPT) 20 12 - 78 U/L  
 AST (SGOT) 26 15 - 37 U/L Alk. phosphatase 86 45 - 117 U/L Protein, total 6.6 6.4 - 8.2 g/dL Albumin 2.8 (L) 3.5 - 5.0 g/dL Globulin 3.8 2.0 - 4.0 g/dL A-G Ratio 0.7 (L) 1.1 - 2.2 NT-PRO BNP Collection Time: 09/19/20  1:53 PM  
Result Value Ref Range NT pro-BNP 7,426 (H) <450 PG/ML  
CK W/ CKMB & INDEX Collection Time: 09/19/20  1:53 PM  
Result Value Ref Range CK - MB 1.2 <3.6 NG/ML  
 CK-MB Index 2.8 (H) 0.0 - 2.5 CK 43 26 - 192 U/L  
TROPONIN I Collection Time: 09/19/20  1:53 PM  
Result Value Ref Range Troponin-I, Qt. 0.07 (H) <0.05 ng/mL URINALYSIS W/ REFLEX CULTURE Collection Time: 09/19/20  2:35 PM  
 Specimen: Urine Result Value Ref Range Color YELLOW/STRAW Appearance CLOUDY (A) CLEAR Specific gravity 1.022 1.003 - 1.030    
 pH (UA) 5.5 5.0 - 8.0 Protein 100 (A) NEG mg/dL Glucose Negative NEG mg/dL Ketone 15 (A) NEG mg/dL Bilirubin Negative NEG Blood Negative NEG Urobilinogen 1.0 0.2 - 1.0 EU/dL Nitrites Positive (A) NEG Leukocyte Esterase MODERATE (A) NEG    
 WBC 10-20 0 - 4 /hpf  
 RBC 0-5 0 - 5 /hpf Epithelial cells FEW FEW /lpf Bacteria 4+ (A) NEG /hpf  
 UA:UC IF INDICATED URINE CULTURE ORDERED (A) CNI POC LACTIC ACID Collection Time: 09/19/20  3:14 PM  
Result Value Ref Range  Lactic Acid (POC) 1.46 0.40 - 2.00 mmol/L

## 2020-09-19 NOTE — ED PROVIDER NOTES
EMERGENCY DEPARTMENT HISTORY AND PHYSICAL EXAM 
 
 
Date: 9/19/2020 Patient Name: Dennis Corrales History of Presenting Illness Chief Complaint Patient presents with  Lethargy Pt states she is not feeling well History Provided By: Patient and EMS 
 
HPI: Dennis Corrales, 80 y.o. female with PMHx significant for hypertension, hyperlipidemia, postherpetic neuralgia, CVA, hypothyroidism, and chronic kidney disease presents to the ED with chief complaint of \"not feeling well\" for for the past month. She states that she has been feeling ill for the last couple of weeks and describes a cough that is productive of \"creamy, stringy\" sputum. She has had low-grade fevers to 99. She reports a runny nose. She also has had some nausea and dry heaves, but denies any vomiting. Her last bowel movement was this morning and was normal.  She has had some burning with urination as well. She normally walks with a walker, but has not been able to walk today and states that she has been feeling dizzy like the room spinning recently. Patient had a right hip fracture in March that was surgically repaired by Dr. Roma Hamilton. PCP: Carlos Garcia MD 
 
No current facility-administered medications on file prior to encounter. Current Outpatient Medications on File Prior to Encounter Medication Sig Dispense Refill  nortriptyline (PAMELOR) 10 mg capsule TAKE 1 TO 2 CAPSULES BY MOUTH AT BEDTIME FOR  SHINGLES  PAIN 180 Cap 0  
 simvastatin (ZOCOR) 20 mg tablet TAKE ONE TABLET BY MOUTH NIGHTLY 90 Tab 3  
 levothyroxine (SYNTHROID) 50 mcg tablet TAKE 2 TABLETS BY MOUTH ON SATURDAY AND SUNDAY AND TAKE 1 TABLET THE OTHER 5 DAYS. PLEASE GIVE 30 MINUTES BEFORE BREAKFAST. 117 Tab 3  
 aspirin delayed-release 81 mg tablet Take 1 Tab by mouth daily. 90 Tab 3  
 acetaminophen (Tylenol Extra Strength) 500 mg tablet Take 1 Tab by mouth two (2) times a day. 100 Tab 5  docusate sodium (COLACE) 50 mg capsule Take 1 Cap by mouth daily as needed for Constipation. 50mg as needed 90 Cap 3  polyethylene glycol (Miralax) 17 gram/dose powder 1 tablespoon with 8 oz of water daily as needed for constipation not relieved with Colace  Indications: constipation 510 g 0  
 metoprolol tartrate (LOPRESSOR) 25 mg tablet TAKE ONE HALF TABLET BY MOUTH ONCE NIGHTLY FOR HYPERTENSION 45 Tab 3  
 vit C,N-Ba-pgabp-lutein-zeaxan (PreserVision AREDS-2) 693-759-37-1 mg-unit-mg-mg cap capsule Take 1 Cap by mouth two (2) times daily (with meals). 180 Cap 3  
 calcium carbonate (Tums) 200 mg calcium (500 mg) chew Take 1 Tab by mouth two (2) times daily as needed (heartburn or dyspepsia). Peppermint flavored 180 Tab 3  
 omeprazole (PRILOSEC) 20 mg capsule Take 1 tablet po with water at least 30 min before breakfast 90 Cap 3 Past History Past Medical History: 
Past Medical History:  
Diagnosis Date  Chronic kidney disease  CKD (chronic kidney disease) stage 3, GFR 30-59 ml/min ([facility]) 5/17/2010  Hypercholesteremia  Hypertension  Post herpetic neuralgia  Stroke ([facility] [address]  Thyroid disease Past Surgical History: 
Past Surgical History:  
Procedure Laterality Date  HX APPENDECTOMY  HX HYSTERECTOMY  1947  
 one ovary out Family History: 
Family History Problem Relation Age of Onset  Cancer Father Social History: 
Social History Tobacco Use  Smoking status: Never Smoker  Smokeless tobacco: Never Used Substance Use Topics  Alcohol use: No  
 Drug use: No  
 
 
Allergies: Allergies Allergen Reactions  Lyrica [Pregabalin] Other (comments) Couldn't function  Macrobid [Nitrofurantoin Monohyd/M-Cryst] Other (comments)  
  sick  Neurontin [Gabapentin] Other (comments) groggy Review of Systems Review of Systems Constitutional: Positive for appetite change, fatigue and fever. Negative for chills. HENT: Positive for congestion and rhinorrhea. Negative for ear pain and sore throat. Respiratory: Positive for cough. Negative for chest tightness, shortness of breath and wheezing. Cardiovascular: Positive for chest pain ( With coughing). Negative for palpitations and leg swelling. Gastrointestinal: Positive for nausea and vomiting. Negative for abdominal pain and diarrhea. Endocrine: Negative. Genitourinary: Positive for dysuria. Negative for flank pain and hematuria. Musculoskeletal: Negative for back pain and myalgias. Skin: Negative for rash and wound. Neurological: Positive for dizziness. Negative for syncope, light-headedness and headaches. Psychiatric/Behavioral: Negative for confusion. The patient is not nervous/anxious. All other systems reviewed and are negative. Physical Exam  
General appearance -elderly, frail, well appearing, and in no distress Eyes - pupils equal and reactive, extraocular eye movements intact ENT - mucous membranes moist, pharynx normal without lesions Neck - supple, no significant adenopathy; non-tender to palpation Chest -Rales bilateral bases (left greater than right); non-tender to palpation Heart - normal rate and regular rhythm, S1 and S2 normal, no murmurs noted Abdomen - soft, nontender, nondistended, no masses or organomegaly Musculoskeletal - no joint tenderness, deformity or swelling; normal ROM Extremities - peripheral pulses normal, no pedal edema Skin - normal coloration and turgor, no rashes Neurological - alert, oriented x3, normal speech, no focal findings or movement disorder noted Diagnostic Study Results Radiologic Studies -  
XR CHEST PORT Final Result IMPRESSION: No acute findings. COPD and chronic changes as above. CT Results  (Last 48 hours) None CXR Results  (Last 48 hours) None Medical Decision Making I am the first provider for this patient. I reviewed the vital signs, available nursing notes, past medical history, past surgical history, family history and social history. Vital Signs-Reviewed the patient's vital signs. Patient Vitals for the past 12 hrs: 
 Temp Pulse Resp BP SpO2  
09/19/20 1332 98.6 °F (37 °C) 86 19 (!) 141/69 97 % EKG at 2:09 PM on September 19, 2020 interpreted by me: Sinus rhythm with PACs, 74 bpm, normal axis, normal [place] and QRS intervals, normal QTc interval, nonspecific ST changes Records Reviewed: Nursing Notes and Old Medical Records Provider Notes (Medical Decision Making):  
Differential diagnosis: Viral syndrome, bronchitis, pneumonia, UTI, dehydration, electrolyte abnormality, congestive heart failure, acute coronary syndrome, covid-19 We will check CBC, CMP, chest x-ray, cardiac enzymes, EKG, proBNP, UA 
 
 
ED Course:  
Initial assessment performed. The patients presenting problems have been discussed, and they are in agreement with the care plan formulated and outlined with them. I have encouraged them to ask questions as they arise throughout their visit. Progress Notes: 
ED Course as of Sep 21 0056 Sat Sep 19, 2020  
1521 Case discussed with Dr. Cynthia Hopper (hospitalist) who will admit the patient. Discussed lab work findings with patient's granddaughter who is power of  and present in the ED. She understands reasons for admission. Patient is alert and oriented and at this time wants to be a full code  
 [AO] ED Course User Index [AO] Susanne Davis MD  
 
 
Disposition: 
Admit to hospitalist 
 
Diagnosis Clinical Impression: 1. Urinary tract infection with hematuria, site unspecified 2. Elevated troponin 3. Cough

## 2020-09-19 NOTE — ED TRIAGE NOTES
Pt came from home by EMS. Pts states that she has not been feeling well for the past few days. Pt has no appetite and feeling achy over. Pt is hard of hearing and denies all other complaints

## 2020-09-20 ENCOUNTER — APPOINTMENT (OUTPATIENT)
Dept: NON INVASIVE DIAGNOSTICS | Age: 85
DRG: 690 | End: 2020-09-20
Attending: HOSPITALIST
Payer: MEDICARE

## 2020-09-20 PROBLEM — R07.9 CHEST PAIN: Status: ACTIVE | Noted: 2020-09-20

## 2020-09-20 LAB
ANION GAP SERPL CALC-SCNC: 3 MMOL/L (ref 5–15)
ATRIAL RATE: 74 BPM
BASOPHILS # BLD: 0 K/UL (ref 0–0.1)
BASOPHILS NFR BLD: 0 % (ref 0–1)
BUN SERPL-MCNC: 23 MG/DL (ref 6–20)
BUN/CREAT SERPL: 20 (ref 12–20)
CALCIUM SERPL-MCNC: 8.6 MG/DL (ref 8.5–10.1)
CALCULATED P AXIS, ECG09: 29 DEGREES
CALCULATED R AXIS, ECG10: -5 DEGREES
CALCULATED T AXIS, ECG11: 45 DEGREES
CHLORIDE SERPL-SCNC: 104 MMOL/L (ref 97–108)
CO2 SERPL-SCNC: 30 MMOL/L (ref 21–32)
CREAT SERPL-MCNC: 1.17 MG/DL (ref 0.55–1.02)
DIAGNOSIS, 93000: NORMAL
DIFFERENTIAL METHOD BLD: ABNORMAL
ECHO AO ROOT DIAM: 3.08 CM
ECHO AR MAX VEL PISA: 397.55 CM/S
ECHO AV AREA PEAK VELOCITY: 1.8 CM2
ECHO AV AREA VTI: 2.01 CM2
ECHO AV AREA/BSA PEAK VELOCITY: 1.1 CM2/M2
ECHO AV AREA/BSA VTI: 1.2 CM2/M2
ECHO AV MEAN GRADIENT: 5.89 MMHG
ECHO AV MEAN VELOCITY: 109.24 CM/S
ECHO AV PEAK GRADIENT: 14.97 MMHG
ECHO AV PEAK GRADIENT: 63.22 MMHG
ECHO AV PEAK VELOCITY: 193.46 CM/S
ECHO AV REGURGITANT PHT: 0.44 S
ECHO AV VTI: 32.01 CM
ECHO LA MAJOR AXIS: 3.07 CM
ECHO LA MINOR AXIS: 1.85 CM
ECHO LV E' LATERAL VELOCITY: 5.88 CM/S
ECHO LV E' SEPTAL VELOCITY: 2.4 CM/S
ECHO LV INTERNAL DIMENSION DIASTOLIC: 2.28 CM (ref 3.9–5.3)
ECHO LV INTERNAL DIMENSION SYSTOLIC: 1.97 CM
ECHO LV IVSD: 1.44 CM (ref 0.6–0.9)
ECHO LV MASS 2D: 142.4 G (ref 67–162)
ECHO LV MASS INDEX 2D: 85.7 G/M2 (ref 43–95)
ECHO LV POSTERIOR WALL DIASTOLIC: 1.95 CM (ref 0.6–0.9)
ECHO LVOT CARDIAC OUTPUT: 5.27 LITER/MINUTE
ECHO LVOT DIAM: 1.88 CM
ECHO LVOT PEAK GRADIENT: 6.3 MMHG
ECHO LVOT PEAK VELOCITY: 125.54 CM/S
ECHO LVOT SV: 64.2 ML
ECHO LVOT VTI: 23.15 CM
ECHO MV A VELOCITY: 73.42 CM/S
ECHO MV AREA PHT: 4.48 CM2
ECHO MV AREA VTI: 2.75 CM2
ECHO MV E DECELERATION TIME (DT): 0.25 S
ECHO MV E VELOCITY: 75.04 CM/S
ECHO MV E/A RATIO: 1.02
ECHO MV E/E' LATERAL: 12.76
ECHO MV E/E' RATIO (AVERAGED): 22.01
ECHO MV E/E' SEPTAL: 31.27
ECHO MV MAX VELOCITY: 108.54 CM/S
ECHO MV MEAN GRADIENT: 2.49 MMHG
ECHO MV PEAK GRADIENT: 4.71 MMHG
ECHO MV PRESSURE HALF TIME (PHT): 0.05 S
ECHO MV VTI: 23.36 CM
ECHO PV MAX VELOCITY: 92.7 CM/S
ECHO PV MEAN GRADIENT: 1.47 MMHG
ECHO PV PEAK INSTANTANEOUS GRADIENT SYSTOLIC: 3.44 MMHG
ECHO PV VTI: 12.72 CM
EOSINOPHIL # BLD: 0.1 K/UL (ref 0–0.4)
EOSINOPHIL NFR BLD: 1 % (ref 0–7)
ERYTHROCYTE [DISTWIDTH] IN BLOOD BY AUTOMATED COUNT: 14.3 % (ref 11.5–14.5)
GLUCOSE SERPL-MCNC: 93 MG/DL (ref 65–100)
HCT VFR BLD AUTO: 40.1 % (ref 35–47)
HGB BLD-MCNC: 12.8 G/DL (ref 11.5–16)
IMM GRANULOCYTES # BLD AUTO: 0 K/UL (ref 0–0.04)
IMM GRANULOCYTES NFR BLD AUTO: 1 % (ref 0–0.5)
LVOT MG: 3.28 MMHG
LYMPHOCYTES # BLD: 1.2 K/UL (ref 0.8–3.5)
LYMPHOCYTES NFR BLD: 29 % (ref 12–49)
MCH RBC QN AUTO: 30.1 PG (ref 26–34)
MCHC RBC AUTO-ENTMCNC: 31.9 G/DL (ref 30–36.5)
MCV RBC AUTO: 94.4 FL (ref 80–99)
MONOCYTES # BLD: 0.5 K/UL (ref 0–1)
MONOCYTES NFR BLD: 11 % (ref 5–13)
NEUTS SEG # BLD: 2.5 K/UL (ref 1.8–8)
NEUTS SEG NFR BLD: 58 % (ref 32–75)
NRBC # BLD: 0 K/UL (ref 0–0.01)
NRBC BLD-RTO: 0 PER 100 WBC
P-R INTERVAL, ECG05: 182 MS
PLATELET # BLD AUTO: 170 K/UL (ref 150–400)
PMV BLD AUTO: 10.1 FL (ref 8.9–12.9)
POTASSIUM SERPL-SCNC: 4.2 MMOL/L (ref 3.5–5.1)
Q-T INTERVAL, ECG07: 426 MS
QRS DURATION, ECG06: 78 MS
QTC CALCULATION (BEZET), ECG08: 472 MS
RBC # BLD AUTO: 4.25 M/UL (ref 3.8–5.2)
SODIUM SERPL-SCNC: 137 MMOL/L (ref 136–145)
TROPONIN I SERPL-MCNC: 0.07 NG/ML
TSH SERPL DL<=0.05 MIU/L-ACNC: 0.94 UIU/ML (ref 0.36–3.74)
VENTRICULAR RATE, ECG03: 74 BPM
WBC # BLD AUTO: 4.3 K/UL (ref 3.6–11)

## 2020-09-20 PROCEDURE — 74011250637 HC RX REV CODE- 250/637: Performed by: INTERNAL MEDICINE

## 2020-09-20 PROCEDURE — 96376 TX/PRO/DX INJ SAME DRUG ADON: CPT

## 2020-09-20 PROCEDURE — 84484 ASSAY OF TROPONIN QUANT: CPT

## 2020-09-20 PROCEDURE — 74011250637 HC RX REV CODE- 250/637: Performed by: EMERGENCY MEDICINE

## 2020-09-20 PROCEDURE — 99218 HC RM OBSERVATION: CPT

## 2020-09-20 PROCEDURE — 93306 TTE W/DOPPLER COMPLETE: CPT

## 2020-09-20 PROCEDURE — 80048 BASIC METABOLIC PNL TOTAL CA: CPT

## 2020-09-20 PROCEDURE — 74011250637 HC RX REV CODE- 250/637: Performed by: HOSPITALIST

## 2020-09-20 PROCEDURE — 74011250637 HC RX REV CODE- 250/637: Performed by: FAMILY MEDICINE

## 2020-09-20 PROCEDURE — 84443 ASSAY THYROID STIM HORMONE: CPT

## 2020-09-20 PROCEDURE — 74011000250 HC RX REV CODE- 250: Performed by: EMERGENCY MEDICINE

## 2020-09-20 PROCEDURE — 36415 COLL VENOUS BLD VENIPUNCTURE: CPT

## 2020-09-20 PROCEDURE — 74011000250 HC RX REV CODE- 250: Performed by: HOSPITALIST

## 2020-09-20 PROCEDURE — 94760 N-INVAS EAR/PLS OXIMETRY 1: CPT

## 2020-09-20 PROCEDURE — 85025 COMPLETE CBC W/AUTO DIFF WBC: CPT

## 2020-09-20 PROCEDURE — 74011250636 HC RX REV CODE- 250/636: Performed by: HOSPITALIST

## 2020-09-20 PROCEDURE — 96372 THER/PROPH/DIAG INJ SC/IM: CPT

## 2020-09-20 PROCEDURE — 97530 THERAPEUTIC ACTIVITIES: CPT | Performed by: OCCUPATIONAL THERAPIST

## 2020-09-20 PROCEDURE — 74011000258 HC RX REV CODE- 258: Performed by: HOSPITALIST

## 2020-09-20 PROCEDURE — 97165 OT EVAL LOW COMPLEX 30 MIN: CPT | Performed by: OCCUPATIONAL THERAPIST

## 2020-09-20 RX ORDER — METOPROLOL TARTRATE 25 MG/1
25 TABLET, FILM COATED ORAL EVERY 12 HOURS
Status: DISCONTINUED | OUTPATIENT
Start: 2020-09-20 | End: 2020-09-21 | Stop reason: HOSPADM

## 2020-09-20 RX ORDER — HYDRALAZINE HYDROCHLORIDE 25 MG/1
25 TABLET, FILM COATED ORAL ONCE
Status: COMPLETED | OUTPATIENT
Start: 2020-09-20 | End: 2020-09-20

## 2020-09-20 RX ADMIN — HEPARIN SODIUM 5000 UNITS: 5000 INJECTION INTRAVENOUS; SUBCUTANEOUS at 06:46

## 2020-09-20 RX ADMIN — SODIUM CHLORIDE 10 ML: 9 INJECTION, SOLUTION INTRAMUSCULAR; INTRAVENOUS; SUBCUTANEOUS at 06:47

## 2020-09-20 RX ADMIN — METOPROLOL TARTRATE 25 MG: 25 TABLET, FILM COATED ORAL at 09:57

## 2020-09-20 RX ADMIN — ACETAMINOPHEN 500 MG: 500 TABLET ORAL at 09:59

## 2020-09-20 RX ADMIN — HYDRALAZINE HYDROCHLORIDE 25 MG: 25 TABLET, FILM COATED ORAL at 01:42

## 2020-09-20 RX ADMIN — HEPARIN SODIUM 5000 UNITS: 5000 INJECTION INTRAVENOUS; SUBCUTANEOUS at 16:57

## 2020-09-20 RX ADMIN — SODIUM CHLORIDE 10 ML: 9 INJECTION, SOLUTION INTRAMUSCULAR; INTRAVENOUS; SUBCUTANEOUS at 21:43

## 2020-09-20 RX ADMIN — METOPROLOL TARTRATE 25 MG: 25 TABLET, FILM COATED ORAL at 21:43

## 2020-09-20 RX ADMIN — ACETAMINOPHEN 500 MG: 500 TABLET ORAL at 17:44

## 2020-09-20 RX ADMIN — ASPIRIN 81 MG: 81 TABLET, COATED ORAL at 09:58

## 2020-09-20 RX ADMIN — LEVOTHYROXINE SODIUM 100 MCG: 0.1 TABLET ORAL at 06:57

## 2020-09-20 RX ADMIN — ATORVASTATIN CALCIUM 10 MG: 10 TABLET, FILM COATED ORAL at 21:43

## 2020-09-20 RX ADMIN — ACETAMINOPHEN 650 MG: 325 TABLET ORAL at 06:57

## 2020-09-20 RX ADMIN — CEFTRIAXONE 1 G: 1 INJECTION, POWDER, FOR SOLUTION INTRAMUSCULAR; INTRAVENOUS at 16:57

## 2020-09-20 RX ADMIN — NORTRIPTYLINE HYDROCHLORIDE 20 MG: 10 CAPSULE ORAL at 21:47

## 2020-09-20 RX ADMIN — SODIUM CHLORIDE 10 ML: 9 INJECTION, SOLUTION INTRAMUSCULAR; INTRAVENOUS; SUBCUTANEOUS at 14:38

## 2020-09-20 RX ADMIN — MULTIPLE VITAMINS W/ MINERALS TAB 1 TABLET: TAB at 09:59

## 2020-09-20 RX ADMIN — MULTIPLE VITAMINS W/ MINERALS TAB 1 TABLET: TAB at 16:57

## 2020-09-20 NOTE — PROGRESS NOTES
Patient arrived on unit by stretcher, alert and verbally responsive, voicing no c/o pain and distress noted. Completing a head to toe assessment @ this time. No family with patient.

## 2020-09-20 NOTE — PROGRESS NOTES
CM informed of CODE 40. CM contacted pt granddaughter [place] Lora and explained CODE 40, MOON letter and State OBS letter copies left in pt room for granddaughter to keep ad for her to sign as she will visit on 9/21/20. Pt does not sign documents POA does. Granddaughter aware to contact DEE DEE Clifford [address] [place] [address], [facility] [place] Care Manager [facility]

## 2020-09-20 NOTE — PROGRESS NOTES
Problem: Falls - Risk of 
Goal: *Absence of Falls Description: Document Payam Garcia Fall Risk and appropriate interventions in the flowsheet. Outcome: Progressing Towards Goal 
Note: Fall Risk Interventions: 
Mobility Interventions: Patient to call before getting OOB Medication Interventions: Patient to call before getting OOB Elimination Interventions: Patient to call for help with toileting needs

## 2020-09-20 NOTE — ED NOTES
Per Telehospitalist, PCR Covid swab not necessary to perform on this patient. Nursing supervisor notified.

## 2020-09-20 NOTE — PROGRESS NOTES
Transition of Care Plan: 1. Home with home health (referral sent to [facility] via connect care) 2. Granddaughter requesting visiting physican( contact number provided by CM) 3.CM to Inquire about who current PCP is as PCP has retired 4. 2nd IM letter Reason for Admission:   UTI, elevated troponin, cough RUR Score:     14 Plan for utilizing home health:     Pt has utilized [facility] in the past. CM spoke with pt granddaughter Denny Barrera ( [phone]) who is onboard with [facility] secous referral due to recommendations for home health post hospitalization. PCP:  
First and Last name: Nubia Ferris listed but granddaughter reports that she has retired and she has contacted the practice and was told that the pt has not been assigned to a new PCP as of date. Current Advanced Directive/Advance Care Plan: Yes, on file DME: Yuniel Cheng, wheelchair, gait belt Pharmacy: [id number] in [address] [place] [address] Way Pt is 8 year old female admitted 9/19/20. Pt granddaughter Denny Barrera [phone]  provided information for assessment. Pt has 24 hour care in her own home. Granddaughter reports that pt requires assistance with ADL's and IADL's,however, pt request to do things on her own often ( although she is not supposed to). Pt has received home health in the past with [facility] and granddaughter request to have [facility] for home health post discharge. Pt has also received care at [facility] and [address] [place] [address]. Pt's granddaughter inquired about a possible visiting physician as the pt get tired quickly and going face to face to office locations is becoming difficult for the pt.  has provided contact information for [facility] [place] [place] [phone] and granddaughter plans to call the am of 9/21/20.  
 
 
CM will continue to follow patient for discharge planning needs and arrange for services as deemed necessary. Likely dc ready for 9/21/20 Care Management Interventions PCP Verified by CM: (PCP has retired per [employer]) Transition of Care Consult (CM Consult): Home Health [address]: Yes Physical Therapy Consult: Yes Current Support Network: Lives Alone(lives alone but has 24 hour private aides) Confirm Follow Up Transport: Family Дмитрий Jewell [address] [facility] 
PR Care Manager [facility]

## 2020-09-20 NOTE — PROGRESS NOTES
Problem: Self Care Deficits Care Plan (Adult) Goal: *Acute Goals and Plan of Care (Insert Text) Description:  
FUNCTIONAL STATUS PRIOR TO ADMISSION: Pt lives alone and has caregivers as well as her family for support. Her family lives close by. Pt reports that she uses a RW or w/c for mobility at home. Since her hip repair in March pt reports that she stayed 3 months in rehab and had Home therapy (not currently) and caregivers. Per nursing, pt's family reports that pt has frequent falls when she mobilizes without assistance. Pt reports that she sponge bathes and dresses independently at home. HOME SUPPORT: The patient lived alone with caregivers and family to provide assistance. Pt feels that she got sick due to one of her caregivers being sick. Occupational Therapy Goals Initiated 9/20/2020 1. Patient will perform grooming in standing, with supervision/set-up within 7 day(s). 2.  Patient will perform sponge bathing with supervision/set-up within 7 day(s). 3.  Patient will perform upper body dressing and lower body dressing with supervision/set-up within 7 day(s). 4.  Patient will perform toilet transfers with supervision/set-up within 7 day(s). 5.  Patient will perform all aspects of toileting with supervision/set-up within 7 day(s). 6.  Patient will tolerate standing adls for at least 5 minutes within 7 days. Outcome: Not Met OCCUPATIONAL THERAPY EVALUATION Patient: Chris Levin (1 y.o. female) Date: 9/20/2020 Primary Diagnosis: UTI (urinary tract infection) [N39.0] Elevated troponin [R79.89] Cough [R05] UTI (urinary tract infection) [N39.0] Chest pain [R07.9] Precautions: fall ASSESSMENT Based on the objective data described below, the patient presents with per family report history of falls, impaired hearing and vision at baseline, decreased balance with tendency for posterior lean, generalized weakness, and decreased endurance impairing independence and safety during adls and functional mobility. Pt is functioning below her generally supervision baseline and will benefit from acute OT. At discharge, [facility] and assistance in determining best home safety plan for patient. She will need 24 hour caregiving/supervision at home. . 
 
Current Level of Function Impacting Discharge (ADLs/self-care): CGA to minimal assistance Functional Outcome Measure: The patient scored Total: 55/100 on the Barthel Index outcome measure which is indicative of 45% impaired ability to care for basic self needs/dependency on others; inferred  dependency on others for instrumental ADLs. Other factors to consider for discharge: had caregivers and family support prior to admission. IMpaired vision and hearing Patient will benefit from skilled therapy intervention to address the above noted impairments. PLAN : 
Recommendations and Planned Interventions: self care training, functional mobility training, therapeutic exercise, balance training, therapeutic activities, endurance activities, neuromuscular re-education, patient education, home safety training, and family training/education Frequency/Duration: Patient will be followed by occupational therapy 5 times a week to address goals. Recommendation for discharge: (in order for the patient to meet his/her long term goals) Occupational therapy at least 2 days/week in the home AND ensure assist and/or supervision for safety with adls and mobility This discharge recommendation: 
Has not yet been discussed the attending provider and/or case management IF patient discharges home will need the following DME: likely none SUBJECTIVE:  
Patient stated I spent 3 months in the rehab place.   (post hip surgery in March) OBJECTIVE DATA SUMMARY:  
HISTORY:  
Past Medical History:  
Diagnosis Date Chronic kidney disease CKD (chronic kidney disease) stage 3, GFR 30-59 ml/min ([facility]) 5/17/2010 Hypercholesteremia Hypertension Post herpetic neuralgia Stroke [facility]) Thyroid disease Past Surgical History:  
Procedure Laterality Date HX APPENDECTOMY HX HYSTERECTOMY  1947  
 one ovary out Expanded or extensive additional review of patient history:  hip fracture/sx in March 2020 Home Situation # Steps to Enter: 4 Rails to Enter: Yes Living Alone: Yes(has caregivers and family to assist) Support Systems: Family member(s), Home care staff Patient Expects to be Discharged to[de-identified] Private residence Current DME Used/Available at Home: Walker, rolling, Wheelchair Tub or Shower Type: (sponge bathes at the sink) Hand dominance: Right EXAMINATION OF PERFORMANCE DEFICITS: 
Cognitive/Behavioral Status: 
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 Appears fragile and dry. Pt c/o toenails needing cutting Edema: none Hearing: Auditory Auditory Impairment: Hard of hearing, bilateral 
 
Vision/Perceptual:   
    
    
    
  
    
Acuity: Impaired near vision; Impaired far vision(sees part of white board-shapes and dark , unable to read it)  pt cannot read name badge. Corrective Lenses: (none--pt reports they do not help) Range of Motion: BUEs:  within functional limits Strength: 
Generalized weakness throughout Coordination: Fine Motor Skills-Upper: Left Intact; Right Intact Gross Motor Skills-Upper: Left Intact; Right Intact Tone & Sensation: Tone is normal 
Sensation not formally assessed Balance: 
Sitting: Intact Standing: Impaired Standing - Static: Constant support;Fair(posterior lean strong initially) Standing - Dynamic : Constant support;Fair(CGA/Min A using RW) Functional Mobility and Transfers for ADLs: 
Bed Mobility: 
Supine to Sit: Supervision;Stand-by assistance Sit to Supine: Supervision;Stand-by assistance Scooting: Stand-by assistance Transfers: 
Sit to Stand: Contact guard assistance(cues to lean forward) Stand to Sit: Contact guard assistance Assistive Device : Walker, rolling(able to ambulate around the bed with CGA/Jenny and RW) ADL Assessment: 
Feeding: Stand-by assistance;Setup(vision impaired) Oral Facial Hygiene/Grooming: Stand-by assistance;Setup(vision impaired) Bathing: Stand-by assistance;Contact guard assistance Upper Body Dressing: Minimum assistance Lower Body Dressing: Minimum assistance ADL Intervention and task modifications: 
 Pt able to perform modified crossed leg technique for lower body adls. Pt declined tx initially but agreeable to walk around bed. Pt declined bathroom mobility Cognitive Retraining Safety/Judgement: Decreased awareness of environment;Decreased awareness of need for assistance;Decreased awareness of need for safety; Fall prevention Therapeutic Exercise: 
Encouraged getting up to chair with nursing assistance for meals and to bathroom Functional Measure: 
Barthel Index: 
 
Bathin Bladder: 10 Bowels: 10 
Groomin Dressin Feeding: 10 Mobility: 0 Stairs: 0 Toilet Use: 5 Transfer (Bed to Chair and Back): 10 Total: 55/100 The Barthel ADL Index: Guidelines 1.  The index should be used as a record of what a patient does, not as a record of what a patient could do. 2. The main aim is to establish degree of independence from any help, physical or verbal, however minor and for whatever reason. 3. The need for supervision renders the patient not independent. 4. A patient's performance should be established using the best available evidence. Asking the patient, friends/relatives and nurses are the usual sources, but direct observation and common sense are also important. However direct testing is not needed. 5. Usually the patient's performance over the preceding 24-48 hours is important, but occasionally longer periods will be relevant. 6. Middle categories imply that the patient supplies over 50 per cent of the effort. 7. Use of aids to be independent is allowed. Lexis Godoy., Barthel, D.W. (1674). Functional evaluation: the Barthel Index. [facility] (14)2. [facility] GERRY Wyman, Esme Calhoun., Marivel Castillo., Antonette Thornton, [address] [place] [address] (1999). Measuring the change indisability after inpatient rehabilitation; comparison of the responsiveness of the Barthel Index and Functional [place] Measure. Journal of Neurology, Neurosurgery, and Psychiatry, 66(4), 707-588. Virginie Calhoun, N.J.A, Yu Rodrigez,  ZENON.J.M, & Davis Mancia, MScottyA. (2004.) Assessment of post-stroke quality of life in cost-effectiveness studies: The usefulness of the Barthel Index and the EuroQoL-5D. [facility], 13, 210-34 Occupational Therapy Evaluation Charge Determination History Examination Decision-Making LOW Complexity : Brief history review  MEDIUM Complexity : 3-5 performance deficits relating to physical, cognitive , or psychosocial skils that result in activity limitations and / or participation restrictions MEDIUM Complexity : Patient may present with comorbidities that affect occupational performnce.  Miniml to moderate modification of tasks or assistance (eg, physical or verbal ) with assesment(s) is necessary to enable patient to complete evaluation Based on the above components, the patient evaluation is determined to be of the following complexity level: LOW Pain Rating: 
Pt complains of L side pain that is strong and brief. Repositioning helped Activity Tolerance:  
Good, for short distance ambulation, minimal adls. Pt initially wanted to rest this date and participate tomorrow. After treatment patient left in no apparent distress:   
Supine in bed, Call bell within reach, Side rails x 3, and nursing informed. Alarm placed with chair set up in preparation for meals. COMMUNICATION/EDUCATION:  
The patients plan of care was discussed with: . Registered nurse Patient/family have participated as able in goal setting and plan of care. This patients plan of care is appropriate for delegation to [facility]. Thank you for this referral. 
Sweetie Chandler OTR/L Time Calculation: 35 mins

## 2020-09-20 NOTE — PROGRESS NOTES
TELE-HOSPITALIST CROSS COVER NOTE: 
 
Visit Vitals BP (!) 186/93 Pulse 94 Temp 97.9 °F (36.6 °C) Resp 22 Ht 5' 7\" (1.702 m) Wt 57.2 kg (126 lb 1.7 oz) SpO2 99% BMI 19.75 kg/m² D/W RN; medical records reviewed; Hydralazine 25mg PO x 1 for BP management Jonny Lanza

## 2020-09-20 NOTE — PROGRESS NOTES
TELE-HOSPITALIST CROSS COVER NOTE: 
 
Visit Vitals BP (!) 198/69 (BP 1 Location: Right arm, BP Patient Position: At rest) Pulse 90 Temp 98.3 °F (36.8 °C) Resp 21 Ht 5' 7\" (1.702 m) Wt 57.2 kg (126 lb 1.7 oz) SpO2 99% BMI 19.75 kg/m² D/W RN; medical records reviewed; patient with negative rapid COVID assay. Currently not hypoxic; no known exposure to COVID-19; CXR with no acute finding reported; will defer ordering PCR assay at this time. Tita Vizcaino

## 2020-09-20 NOTE — PROGRESS NOTES
Hospitalist Progress Note NAME: Jose Anderson :  1912 MRN:  [id number] Assessment / Plan: 
Patient is a 80 YOF w/hx hip fracture, admitted for weakness, cough, chest pain, found to have UTI. Troponins have jael flat and she has been pain free. ECHO pending. Will get PT for eval and likely DC in AM with home health PT/OT Weakness and debility 2/2 UTI 
UTI, POA 
--continue rocephin - Will convert to cefpodoxime tomorrow --Awaiting C&S 
--Appears well today, still has significant weakness. Will get PT/OT 
-----Per grand daughter her mother considers wheeling herself in wheelchair exercise. She is supposed to be using walker but only uses it when family makes her.  
  
Cough Chest pain SOB Elevated troponin 0.07 Generalized weakness 
--ekg with Qs in septal leads suggests prior infarct, no acute ischemic changes --put on aspirin 
--Troponins flat at 0.06 
--rapid SARS-COV-2 test negative 
--ECHO pending 
--consult pt/ot 
--Asymptomatic today 
--Per granddaughter, patient stated she was having \"intestinges infection\" and wanted Abx, and that was why they brought her in. She has not had a cough/CP that they can remember 
 
Back Pain 
--C/O back pain today --Tylenol for pain --Heating pad 
--Avoid opiates in elderly 
  
HTN - Uncontrolled 
--continue toprol  
--Increase dose and timing 
--Was given hydralazine for HTN overnight 
  
Hyperlipidemia 
--continue lipitor 
  
Hypothyroid --continue synthroid check tsh 
  
CKD stage 3 
--Cr stable 
  
GERD 
--maalox prn 
  
Hx right hip fracture s/p repair 3/2020 
--uses walker and requires supervision when walking 
--fall risk 
  
Macular degeneration with severe impaired vision 
--continue preservision 
  
Very hard of hearing 
  
Body mass index is 19.75 kg/m². 
  
Code: DNR/DNI 
DVT prophylaxis: heparin SQ Surrogate decision maker:  Diane granddajustino Motley cell [phone] Subjective: Chief Complaint / Reason for Physician Visit \"*My back hurts\". Patient states she has back pain. She has taken tylenol, and she would like a heating pad. She denies CP, cough, or other complaints. She has no abdominal pain. She is very clear and oriented, though hard of hearing Discussed with RN events overnight. Review of Systems: 
Symptom Y/N Comments  Symptom Y/N Comments Fever/Chills n   Chest Pain n   
Poor Appetite n   Edema n   
Cough n   Abdominal Pain n   
Sputum n   Joint Pain n   
SOB/SMITH n   Pruritis/Rash n   
Nausea/vomit n   Tolerating PT/OT na   
Diarrhea n   Tolerating Diet y Constipation n   Other y Back Pain Could NOT obtain due to:   
 
Objective: VITALS:  
Last 24hrs VS reviewed since prior progress note. Most recent are: 
Patient Vitals for the past 24 hrs: 
 Temp Pulse Resp BP SpO2  
09/20/20 0725 99 °F (37.2 °C) 98 20 (!) 167/67 95 % 09/20/20 0644    (!) 169/69   
09/20/20 0250 99.4 °F (37.4 °C) 81 20 (!) 178/55 100 % 09/20/20 0142  82  (!) 177/71   
09/19/20 2246 97.9 °F (36.6 °C) 94 22 (!) 186/93 99 % 09/19/20 2157    (!) 159/62   
09/19/20 2130  80 23 (!) 159/62 96 % 09/19/20 2001 98.3 °F (36.8 °C) 90 21 (!) 198/69 99 % 09/19/20 1647 98.6 °F (37 °C) 75 20 (!) 172/66 96 % 09/19/20 1332 98.6 °F (37 °C) 86 19 (!) 141/69 97 % Intake/Output Summary (Last 24 hours) at 9/20/2020 8161 Last data filed at 9/19/2020 2225 Gross per 24 hour Intake 50 ml Output  Net 50 ml PHYSICAL EXAM: 
General: WD, WN. Alert, cooperative, no acute distress   
EENT:  EOMI. Anicteric sclerae. MMM Resp:  CTA bilaterally, no wheezing or rales. No accessory muscle use CV:  Regular  rhythm,  No edema GI:  Soft, Non distended, Non tender.  +Bowel sounds Neurologic:  Alert and oriented X 3, normal speech, Psych:   Good insight. Not anxious nor agitated Skin:  No rashes. No jaundice Reviewed most current lab test results and cultures  YES 
 Reviewed most current radiology test results   YES Review and summation of old records today    NO Reviewed patient's current orders and MAR    YES 
PMH/SH reviewed - no change compared to H&P 
________________________________________________________________________ Care Plan discussed with: 
  Comments Patient y Family  y Cj Salmeron RN y   
Care Manager Consultant Multidiciplinary team rounds were held today with , nursing, pharmacist and clinical coordinator. Patient's plan of care was discussed; medications were reviewed and discharge planning was addressed. ________________________________________________________________________ Total NON critical care TIME:  35   Minutes Total CRITICAL CARE TIME Spent:   Minutes non procedure based Comments >50% of visit spent in counseling and coordination of care x   
________________________________________________________________________ Chito Treviño MD  
 
Procedures: see electronic medical records for all procedures/Xrays and details which were not copied into this note but were reviewed prior to creation of Plan. LABS: 
I reviewed today's most current labs and imaging studies. Pertinent labs include: 
Recent Labs  
  09/20/20 
0320 09/19/20 
1353 WBC 4.3 3.5* HGB 12.8 12.6 HCT 40.1 39.3  159 Recent Labs  
  09/20/20 
0320 09/19/20 
1353  137  
K 4.2 3.7  103 CO2 30 28 GLU 93 103* BUN 23* 26* CREA 1.17* 1.32* CA 8.6 8.4* ALB  --  2.8* TBILI  --  0.3 ALT  --  20 Signed: Chito Treviño MD

## 2020-09-20 NOTE — ED NOTES
TRANSFER - OUT REPORT: 
 
Verbal report given to Lakeisha(name) on Nicolasa Victoria  being transferred to Gen Surg(unit) for routine progression of care Report consisted of patients Situation, Background, Assessment and  
Recommendations(SBAR). Information from the following report(s) SBAR, ED Summary and MAR was reviewed with the receiving nurse. Lines:  
Peripheral IV 09/19/20 Right Wrist (Active) Site Assessment Clean, dry, & intact 09/19/20 1357 Phlebitis Assessment 0 09/19/20 1357 Infiltration Assessment 0 09/19/20 1357 Dressing Status Clean, dry, & intact 09/19/20 1357 Dressing Type Transparent 09/19/20 1357 Hub Color/Line Status Pink;Flushed 09/19/20 1357 Alcohol Cap Used Yes 09/19/20 1357 Opportunity for questions and clarification was provided.

## 2020-09-20 NOTE — ROUTINE PROCESS
4234 Bedside and Verbal shift change report given to Gil Montiel RN (oncoming nurse) by Anthony Edwards RN (offgoing nurse). Report included the following information SBAR, Kardex, [facility] and Cardiac Rhythm Sinus Arrhythmia. Called Charge Nurse Abiel Medina about Isolation status, \"Droplet Plus'. Per Charge nurse, she had spoken with Supervisor and the explanation is that since patient was negative for Rapid Covid Testing in the ER, patient can remain on the unit. Patient remains afebrile, denies chest pain, patient has productive cough. Lungs are coarse to auscultate. Patient denies any pain or discomfort. 6930 Spoke with Elyse Hernandez MD rounding this morning about patient in regards to orders for COVID testing, per MD, she is unsure why the order was put in, and at this point there is no indication from MD to do further testing. Verbal order received from Dr Supriya Dias for PT/OT. Patient is resting in bed at this time without any signs or symptoms of distress, call bell within reach.

## 2020-09-21 VITALS
RESPIRATION RATE: 17 BRPM | HEIGHT: 67 IN | WEIGHT: 126 LBS | HEART RATE: 80 BPM | OXYGEN SATURATION: 94 % | SYSTOLIC BLOOD PRESSURE: 145 MMHG | DIASTOLIC BLOOD PRESSURE: 72 MMHG | BODY MASS INDEX: 19.78 KG/M2 | TEMPERATURE: 98.7 F

## 2020-09-21 LAB
BACTERIA SPEC CULT: ABNORMAL
BACTERIA SPEC CULT: ABNORMAL
BASOPHILS # BLD: 0 K/UL (ref 0–0.1)
BASOPHILS NFR BLD: 0 % (ref 0–1)
CC UR VC: ABNORMAL
DIFFERENTIAL METHOD BLD: ABNORMAL
EOSINOPHIL # BLD: 0 K/UL (ref 0–0.4)
EOSINOPHIL NFR BLD: 1 % (ref 0–7)
ERYTHROCYTE [DISTWIDTH] IN BLOOD BY AUTOMATED COUNT: 14.4 % (ref 11.5–14.5)
HCT VFR BLD AUTO: 36.1 % (ref 35–47)
HGB BLD-MCNC: 11.3 G/DL (ref 11.5–16)
IMM GRANULOCYTES # BLD AUTO: 0 K/UL (ref 0–0.04)
IMM GRANULOCYTES NFR BLD AUTO: 0 % (ref 0–0.5)
LYMPHOCYTES # BLD: 1.2 K/UL (ref 0.8–3.5)
LYMPHOCYTES NFR BLD: 32 % (ref 12–49)
MCH RBC QN AUTO: 29.5 PG (ref 26–34)
MCHC RBC AUTO-ENTMCNC: 31.3 G/DL (ref 30–36.5)
MCV RBC AUTO: 94.3 FL (ref 80–99)
MONOCYTES # BLD: 0.5 K/UL (ref 0–1)
MONOCYTES NFR BLD: 14 % (ref 5–13)
NEUTS SEG # BLD: 1.9 K/UL (ref 1.8–8)
NEUTS SEG NFR BLD: 53 % (ref 32–75)
NRBC # BLD: 0 K/UL (ref 0–0.01)
NRBC BLD-RTO: 0 PER 100 WBC
PLATELET # BLD AUTO: 177 K/UL (ref 150–400)
PMV BLD AUTO: 10.5 FL (ref 8.9–12.9)
RBC # BLD AUTO: 3.83 M/UL (ref 3.8–5.2)
SERVICE CMNT-IMP: ABNORMAL
WBC # BLD AUTO: 3.6 K/UL (ref 3.6–11)

## 2020-09-21 PROCEDURE — 36415 COLL VENOUS BLD VENIPUNCTURE: CPT

## 2020-09-21 PROCEDURE — 97116 GAIT TRAINING THERAPY: CPT | Performed by: PHYSICAL THERAPIST

## 2020-09-21 PROCEDURE — 96372 THER/PROPH/DIAG INJ SC/IM: CPT

## 2020-09-21 PROCEDURE — 85025 COMPLETE CBC W/AUTO DIFF WBC: CPT

## 2020-09-21 PROCEDURE — 97161 PT EVAL LOW COMPLEX 20 MIN: CPT | Performed by: PHYSICAL THERAPIST

## 2020-09-21 PROCEDURE — 74011250637 HC RX REV CODE- 250/637: Performed by: HOSPITALIST

## 2020-09-21 PROCEDURE — 74011250636 HC RX REV CODE- 250/636: Performed by: HOSPITALIST

## 2020-09-21 PROCEDURE — 99218 HC RM OBSERVATION: CPT

## 2020-09-21 PROCEDURE — 65270000029 HC RM PRIVATE

## 2020-09-21 PROCEDURE — 74011250637 HC RX REV CODE- 250/637: Performed by: INTERNAL MEDICINE

## 2020-09-21 RX ORDER — CEFDINIR 300 MG/1
300 CAPSULE ORAL 2 TIMES DAILY
Qty: 2 CAP | Refills: 0 | Status: SHIPPED | OUTPATIENT
Start: 2020-09-21 | End: 2020-09-25 | Stop reason: ALTCHOICE

## 2020-09-21 RX ORDER — METOPROLOL TARTRATE 25 MG/1
25 TABLET, FILM COATED ORAL EVERY 12 HOURS
Qty: 60 TAB | Refills: 2 | Status: SHIPPED | OUTPATIENT
Start: 2020-09-21 | End: 2021-01-01 | Stop reason: SDUPTHER

## 2020-09-21 RX ADMIN — METOPROLOL TARTRATE 25 MG: 25 TABLET, FILM COATED ORAL at 09:22

## 2020-09-21 RX ADMIN — ACETAMINOPHEN 500 MG: 500 TABLET ORAL at 09:22

## 2020-09-21 RX ADMIN — HEPARIN SODIUM 5000 UNITS: 5000 INJECTION INTRAVENOUS; SUBCUTANEOUS at 04:59

## 2020-09-21 RX ADMIN — SODIUM CHLORIDE 10 ML: 9 INJECTION, SOLUTION INTRAMUSCULAR; INTRAVENOUS; SUBCUTANEOUS at 05:59

## 2020-09-21 RX ADMIN — ASPIRIN 81 MG: 81 TABLET, COATED ORAL at 09:22

## 2020-09-21 RX ADMIN — SODIUM CHLORIDE 10 ML: 9 INJECTION, SOLUTION INTRAMUSCULAR; INTRAVENOUS; SUBCUTANEOUS at 06:00

## 2020-09-21 RX ADMIN — MULTIPLE VITAMINS W/ MINERALS TAB 1 TABLET: TAB at 09:22

## 2020-09-21 RX ADMIN — LEVOTHYROXINE SODIUM 50 MCG: 0.05 TABLET ORAL at 06:21

## 2020-09-21 NOTE — DISCHARGE SUMMARY
Hospitalist Discharge Summary Patient ID: Vivian Beck [id number] 
89 y.o. 
2/14/1912 9/19/2020 PCP on record: Ramiro Luis MD 
 
Admit date: 9/19/2020 Discharge date and time: 9/21/2020 DISCHARGE DIAGNOSIS: 
 
Weakness and debility 2/2 UTI 
UTI, POA Cough Chest pain ACS ruled out, likely due to osteoarthritis Mildly Elevated troponin 0.07 Generalized weakness likely due to UTI Chronic Back Pain HTN - Uncontrolled Hyperlipidemia Hypothyroidism CKD stage 3 GERD Hx right hip fracture s/p repair 3/2020 Macular degeneration with severe impaired vision Very hard of hearing CONSULTATIONS: 
IP CONSULT TO HOSPITALIST Excerpted HPI from H&P of Cali Seth MD: Vivian Beck is a 80 y.o. female PMH HTN, hyperlipidemia, CKD 3, hypothyroid presents with above reasons. History from patient difficult due to severe hearing loss. Also additional history from granddaughter Deena Kerr over phone. 
  
Not feeling well for 1 week with generalized weakness, loss of appetite, cough with white sputum, nasal drainage, chest congestion, SOB, intermittent sharp mid sternal chest pain, occasional nausea, no vomiting. Patient been saying she wants to go to ER but then changes her mind for past 4 days. Today granddaughter decided to call ambulance. Lives in her home with 24 hour caregivers. One caregiver got a cold recently but granddaughter substituted her out. At baseline, walks with walker and supervision and some memory loss, disorientation with time. 
  
We were asked to admit for work up and evaluation of the above problems.  
  
 
______________________________________________________________________ DISCHARGE SUMMARY/HOSPITAL COURSE:  for full details see H&P, daily progress notes, labs, consult notes. Weakness and debility 2/2 UTI 
UTI, POA 
--s/p rocephin - Will convert to cefdinir on discharge today 
-- C&S showed alpha hemolytic streptococcus --Appears well today, still has significant weakness. Will get PT/OT 
--Per grand daughter her mother considers wheeling herself in wheelchair exercise. She is supposed to be using walker but only uses it when family makes her.  
  
Chest pain ACS ruled out, Troponin flat mild elevated Echo showed preserved EF Chest pain is likely due to Osteoartritis Generalized weakness 
--ekg with Qs in septal leads suggests prior infarct, no acute ischemic changes --put on aspirin 
--Troponins flat at 0.06 
--rapid SARS-COV-2 test negative 
--Asymptomatic today 
--Per granddaughter, patient stated she was having \"intestines infection\" and wanted Abx, and that was why they brought her in. She has not had a cough/CP that they can remember 
  
 Chronic Back Pain 
--Tylenol PRN for pain --Heating pad 
--Avoid opiates in elderly 
  
HTN - Uncontrolled 
--continue toprol  
--Increase dose to 25mg BID 
  
Hyperlipidemia 
--continue lipitor 
  
Hypothyroid 
 
  
CKD stage 3 
--Cr stable 
  
GERD 
--maalox prn 
  
Hx right hip fracture s/p repair 3/2020 
--uses walker and requires supervision when walking 
--fall risk 
  
Macular degeneration with severe impaired vision 
--continue preservision 
  
Very hard of hearing 
 
 
 
_______________________________________________________________________ Patient seen and examined by me on discharge day. Pertinent Findings: 
Gen:    Not in distress Chest: Clear lungs CVS:   Regular rhythm. No edema Abd:  Soft, not distended, not tender Neuro:  Alert,oriented x3 
_______________________________________________________________________ DISCHARGE MEDICATIONS:  
Current Discharge Medication List  
  
START taking these medications Details  
cefdinir (OMNICEF) 300 mg capsule Take 1 Cap by mouth two (2) times a day. Qty: 2 Cap, Refills: 0 CONTINUE these medications which have CHANGED  Details  
metoprolol tartrate (LOPRESSOR) 25 mg tablet Take 1 Tab by mouth every twelve (12) hours. Qty: 60 Tab, Refills: 2 CONTINUE these medications which have NOT CHANGED Details  
nortriptyline (PAMELOR) 10 mg capsule TAKE 1 TO 2 CAPSULES BY MOUTH AT BEDTIME FOR  SHINGLES  PAIN Qty: 180 Cap, Refills: 0  
  
simvastatin (ZOCOR) 20 mg tablet TAKE ONE TABLET BY MOUTH NIGHTLY Qty: 90 Tab, Refills: 3  
  
levothyroxine (SYNTHROID) 50 mcg tablet TAKE 2 TABLETS BY MOUTH ON SATURDAY AND SUNDAY AND TAKE 1 TABLET THE OTHER 5 DAYS. PLEASE GIVE 30 MINUTES BEFORE BREAKFAST. Qty: 117 Tab, Refills: 3 Associated Diagnoses: Hypothyroidism due to acquired atrophy of thyroid  
  
aspirin delayed-release 81 mg tablet Take 1 Tab by mouth daily. Qty: 90 Tab, Refills: 3 Associated Diagnoses: Asymptomatic PVD (peripheral vascular disease) (HCC)  
  
acetaminophen (Tylenol Extra Strength) 500 mg tablet Take 1 Tab by mouth two (2) times a day. Qty: 100 Tab, Refills: 5  
  
polyethylene glycol (Miralax) 17 gram/dose powder 1 tablespoon with 8 oz of water daily as needed for constipation not relieved with Colace  Indications: constipation 
Qty: 510 g, Refills: 0 Associated Diagnoses: Slow transit constipation  
  
vit C,V-Tn-ozjod-lutein-zeaxan (PreserVision AREDS-2) 687-761-15-1 mg-unit-mg-mg cap capsule Take 1 Cap by mouth two (2) times daily (with meals). Qty: 180 Cap, Refills: 3  
  
calcium carbonate (Tums) 200 mg calcium (500 mg) chew Take 1 Tab by mouth two (2) times daily as needed (heartburn or dyspepsia). Peppermint flavored 
Qty: 180 Tab, Refills: 3  
  
omeprazole (PRILOSEC) 20 mg capsule Take 1 tablet po with water at least 30 min before breakfast 
Qty: 90 Cap, Refills: 3 Associated Diagnoses: GERD without esophagitis Patient Follow Up Instructions: Activity: Activity as tolerated Diet: Cardiac Diet Wound Care: None needed Follow-up with PCP in one week Follow-up tests/labs None Follow-up Information Follow up With Specialties Details Why Contact Info Royal Aubrey MD Internal Medicine In 1 week  [id number] [facility] [address] [phone] 
  
  
 
________________________________________________________________ Risk of deterioration: High 
 
Condition at Discharge:  Stable 
__________________________________________________________________ Disposition Home with family and home health services 
 
____________________________________________________________________ Code Status: DNR/DNI 
___________________________________________________________________ Total time in minutes spent coordinating this discharge (includes going over instructions, follow-up, prescriptions, and preparing report for sign off to her PCP) :  40 minutes Signed: 
Melissa Samuel MD

## 2020-09-21 NOTE — PROGRESS NOTES
1915 Bedside report, Pt Ax3 no s/s of acute distress noted. 0024 Pt resting in bed, no acute changes noted. 0417 No acute changes noted. 0715 Bedside and Verbal shift change report given to [facility] (oncoming nurse) by Afshin Mejia (offgoing nurse). Report included the following information SBAR, Kardex, Intake/Output, MAR, Accordion, Recent Results, Med Rec Status and Quality Measures.

## 2020-09-21 NOTE — PROGRESS NOTES
Problem: Mobility Impaired (Adult and Pediatric) Goal: *Acute Goals and Plan of Care (Insert Text) Description: FUNCTIONAL STATUS PRIOR TO ADMISSION: Patient was modified independent using a rolling walker for functional mobility. HOME SUPPORT PRIOR TO ADMISSION: Patient was living with 24 hour caregivers. Physical Therapy Goals Initiated 9/21/2020 1. Patient will move from supine to sit and sit to supine  in bed with modified independence within 7 day(s). 2.  Patient will transfer from bed to chair and chair to bed with modified independence using the least restrictive device within 7 day(s). 3.  Patient will perform sit to stand with modified independence within 7 day(s). 4.  Patient will ambulate with modified independence for 50 feet with the least restrictive device within 7 day(s). 5.  Patient will ascend/descend 4 stairs with 1 handrail(s) with modified independence within 7 day(s). Outcome: Progressing Towards Goal 
 PHYSICAL THERAPY EVALUATION Patient: Foster Adam (89 y.o. female) Date: 9/21/2020 Primary Diagnosis: UTI (urinary tract infection) [N39.0] Elevated troponin [R79.89] Cough [R05] UTI (urinary tract infection) [N39.0] Chest pain [R07.9] UTI (urinary tract infection) [N39.0] Precautions: fall ASSESSMENT Based on the objective data described below, the patient presents with slight decreased functional mobility from baseline. Currently needing SBA for supine to sit and Abdirahman for sit to stand. Once upright able to amb with CGA using RW for short distance. She is likely not too far from her functional baseline and will be safe to DC back home with caregivers and suggest [facility] PT follow up . Other factors to consider for discharge: at risk for falls Patient will benefit from skilled therapy intervention to address the above noted impairments.     
 
PLAN : 
Recommendations and Planned Interventions: bed mobility training, transfer training, gait training, therapeutic exercises, neuromuscular re-education, patient and family training/education, and therapeutic activities Frequency/Duration: Patient will be followed by physical therapy:  5 times a week to address goals. Recommendation for discharge: (in order for the patient to meet his/her long term goals) Physical therapy at least 2 days/week in the home IF patient discharges home will need the following DME: patient owns DME required for discharge SUBJECTIVE:  
Patient stated \"do you want me to sit in that chair?  OBJECTIVE DATA SUMMARY:  
HISTORY:   
Past Medical History:  
Diagnosis Date Chronic kidney disease CKD (chronic kidney disease) stage 3, GFR 30-59 ml/min ([facility]) 5/17/2010 Hypercholesteremia Hypertension Post herpetic neuralgia Stroke [facility]) Thyroid disease Past Surgical History:  
Procedure Laterality Date HX APPENDECTOMY HX HYSTERECTOMY  1947  
 one ovary out Personal factors and/or comorbidities impacting plan of care:  
 
Home Situation # Steps to Enter: 4 Rails to Enter: Yes Living Alone: Yes(has caregivers and family to assist) Support Systems: Family member(s), Home care staff Patient Expects to be Discharged to[de-identified] Private residence Current DME Used/Available at Home: Walker, rolling, Wheelchair Tub or Shower Type: (sponge bathes at the sink) EXAMINATION/PRESENTATION/DECISION MAKING:  
Critical Behavior: 
Neurologic State: Alert Orientation Level: Oriented to person, Oriented to place, Oriented to situation Cognition: Follows commands(per nsg, family reports multiple falls since hip surgery in ) Safety/Judgement: Decreased awareness of environment, Decreased awareness of need for assistance, Decreased awareness of need for safety, Fall prevention Hearing: Auditory Auditory Impairment: Hard of hearing, bilateral 
 
Range Of Motion: 
AROM: Generally decreased, functional 
  
  
  
  
  
  
  
 Strength:   
Strength: Generally decreased, functional 
  
  
 
  
Functional Mobility: 
Bed Mobility: 
  
Supine to Sit: Supervision; Additional time Scooting: Stand-by assistance; Additional time Transfers: 
Sit to Stand: Minimum assistance;Assist x1 Stand to Sit: Contact guard assistance Balance:  
Sitting: Intact Standing: Impaired Standing - Static: Constant support; Fair 
Standing - Dynamic : Constant support; Lena Abts Ambulation/Gait Training: 
Distance (ft): 20 Feet (ft) Assistive Device: Gait belt;Walker, rolling Ambulation - Level of Assistance: Contact guard assistance Gait Description (WDL): Exceptions to [facility] Gait Abnormalities: Shuffling gait Base of Support: Narrowed Speed/Natalie: Pace decreased (<100 feet/min); Shuffled; Slow Step Length: Left shortened;Right shortened Pain Rating: 
No c/o pain Activity Tolerance:  
Good and requires rest breaks Please refer to the flowsheet for vital signs taken during this treatment. After treatment patient left in no apparent distress:  
Sitting in chair and Call bell within reach COMMUNICATION/EDUCATION:  
The patients plan of care was discussed with: Physical therapist, Occupational therapist, and Registered nurse. Fall prevention education was provided and the patient/caregiver indicated understanding., Patient/family have participated as able in goal setting and plan of care. , and Patient/family agree to work toward stated goals and plan of care. Thank you for this referral. 
Dav Hassan PT, DPT Time Calculation: 10 mins

## 2020-09-21 NOTE — DISCHARGE INSTRUCTIONS
HOSPITALIST DISCHARGE INSTRUCTIONS    NAME: Janina Rivera   :  1912   MRN:  [id number]     Date/Time:  2020 9:57 AM    ADMIT DATE: 2020     DISCHARGE DATE: 2020     DISCHARGE DIAGNOSIS:  UTI    MEDICATIONS:  · It is important that you take the medication exactly as they are prescribed. · Keep your medication in the bottles provided by the pharmacist and keep a list of the medication names, dosages, and times to be taken in your wallet. · Do not take other medications without consulting your doctor. Pain Management: per above medications    What to do at Home    Recommended diet:  Cardiac Diet    Recommended activity: Activity as tolerated    If you have questions regarding the hospital related prescriptions or hospital related issues please call [facility] cyrus Burdick at [phone]. If you experience any of the following symptoms then please call your primary care physician or return to the emergency room if you cannot get hold of your doctor:  Fever, chills, nausea, vomiting, diarrhea, change in mentation, falling, bleeding, shortness of breath. Follow Up:  Dr. Lucila Saul MD  you are to call and set up an appointment to see them in 7-10 days. Information obtained by :  I understand that if any problems occur once I am at home I am to contact my physician. I understand and acknowledge receipt of the instructions indicated above.                                                                                                                                            Physician's or R.N.'s Signature                                                                  Date/Time                                                                                                                                              Patient or Representative Signature                                                          Date/Time

## 2020-09-21 NOTE — PROGRESS NOTES
Plan: 
-Home with granddtr 
-[facility]  SN/PT 
-PCP f/u appts -Granddtr to transport at d/c  
 
 
1:00PM 
Pt to d/c home with Chaz griffin. Referral sent to [facility] over the w/e but unable to staff. CM spoke with granddtr by phone who is agreeable to any other [address]. Referal sent to [facility] [place] and accepted. INformation added to AVS. PCP appt scheduled and added to AVS. Granddtr to bedside. 2nd IM notice provided and explanation. Granddtr verbalized understanding. No signature at this time. Pt d/c home with grnaddtr by private vehicle. CM available for any additional needs. Care Management Interventions PCP Verified by CM: Yes Mode of Transport at Discharge: Other (see comment)(Granddtr) Transition of Care Consult (CM Consult): Home Health [address] [place] [address] Road: No 
Reason Outside [facility] Agency Chosen: Unable to staff case Physical Therapy Consult: Yes Occupational Therapy Consult: Yes Current Support Network: Relative's Home Confirm Follow Up Transport: Family The Plan for Transition of Care is Related to the Following Treatment Goals : 34 Merged with [facility] Marky Carrasquillo The Patient and/or Patient Representative was Provided with a Choice of Provider and Agrees with the Discharge Plan?: Yes Name of the Patient Representative Who was Provided with a Choice of Provider and Agrees with the Discharge Plan: Brenden Fam Freedom of Choice List was Provided with Basic Dialogue that Supports the Patient's Individualized Plan of Care/Goals, Treatment Preferences and Shares the Quality Data Associated with the Providers?: Yes Discharge Location Discharge Placement: Home with home health([facility] ) ABRAHAM Grubbs Care Manager 
T2

## 2020-09-21 NOTE — PROGRESS NOTES
Problem: Falls - Risk of 
Goal: *Absence of Falls Description: Document Kaitlin Martini Fall Risk and appropriate interventions in the flowsheet. Outcome: Progressing Towards Goal 
Note: Fall Risk Interventions: 
Mobility Interventions: Bed/chair exit alarm, Communicate number of staff needed for ambulation/transfer Mentation Interventions: Bed/chair exit alarm, Adequate sleep, hydration, pain control, Door open when patient unattended, More frequent rounding, Reorient patient, Room close to nurse's station, Toileting rounds, Update white board Medication Interventions: Bed/chair exit alarm, Patient to call before getting OOB, Teach patient to arise slowly Elimination Interventions: Bed/chair exit alarm, Call light in reach, Patient to call for help with toileting needs, Stay With Me (per policy), Toilet paper/wipes in reach, Toileting schedule/hourly rounds History of Falls Interventions: Bed/chair exit alarm, Door open when patient unattended, Room close to nurse's station Problem: Patient Education: Go to Patient Education Activity Goal: Patient/Family Education Outcome: Progressing Towards Goal

## 2020-09-21 NOTE — PROGRESS NOTES
IV has been removed. Pt discharged per MD order. Pt has all personal belongings, prescriptions, and discharge instructions. Discharge instructions gone over with pt and granddaughter in detail at bedside. Pt does not have any further questions regarding discharge. Aware of follow up appointments.

## 2020-09-22 ENCOUNTER — PATIENT OUTREACH (OUTPATIENT)
Dept: CASE MANAGEMENT | Age: 85
End: 2020-09-22

## 2020-09-22 NOTE — PROGRESS NOTES
Patient was admitted to [facility] on 9-19-20 and discharged on 9-21-20 for: 
 
DISCHARGE DIAGNOSIS: 
  
Weakness and debility 2/2 UTI 
UTI, POA Cough Chest pain ACS ruled out, likely due to osteoarthritis Mildly Elevated troponin 0.07 Generalized weakness likely due to UTI Chronic Back Pain HTN - Uncontrolled Hyperlipidemia Hypothyroidism CKD stage 3 GERD Hx right hip fracture s/p repair 3/2020 Macular degeneration with severe impaired vision Very hard of hearing 
  
Patient's granddaughter, Reagan Becker (on PHI), was contacted within one business day of discharge. Discharge Challenges to be reviewed by the provider:  
Additional needs identified to be addressed with provider:  yes 
-  Granddaughter reports patient has generalized weakness. Granddaughter denies patient having fever/chills since discharge, denies nausea/vomiting and states patient's appetite is good. Per granddaughter, patient has 24/7 care givers in her private residence and granddaughter lives across the street. Granddaughter has no red flags to report at this time.  
- Granddaughter reports patient has had four falls in the last six months, and a total of five falls in the last twelve months. Some falls with traumatic injury. Granddaughter states patient is currently able to ambulate with her walker and with the assistance of home care staff. 
- Granddaughter expresses concern regarding the new dosage of Lopressor. Patient is currently taking Lopressor 25mg tab, take one tab every 12 hours since discharge on 9-21-20. Granddaughter states she believes this may be too high of a dosage as patient's previous dose was 12.5mg daily. 
- Granddaughter states patient refuses to take Prilosec 20mg, states this medication upsets her stomach. Please consider removing this medication from patient's current medication list. Prilosec was marked as 'Not Taking' on today's medication reconciliation. Discussed COVID-19 related testing which was available at this time. Test results were negative. Patient's granddaughter, Gama Dick (on PHI), informed of results, if available? yes Method of communication with provider : chart routing to Dr. Laurence Miller as patient has [facility] appointment scheduled with Dr. Candy Chaves on 9-25-20. Dr. Hair Corona. Migdalia Domingo no longer with [facility]. Component Latest Ref Rng & Units 9/21/2020 9/20/2020 9/20/2020 9/20/2020  
 
      4:27 AM  3:20 AM  3:20 AM  3:20 AM  
HGB 
    11.5 - 16.0 g/dL 11.3 (L)   12.8 Component Latest Ref Rng & Units 9/19/2020 9/19/2020 9/19/2020 9/19/2020  
 
      7:59 PM  1:53 PM  1:53 PM  1:53 PM  
HGB 
    11.5 - 16.0 g/dL Component Latest Ref Rng & Units 9/19/2020  
 
      1:53 PM  
HGB 
    11.5 - 16.0 g/dL 12.6 Component Latest Ref Rng & Units 9/21/2020 9/20/2020 9/20/2020 9/20/2020  
 
      4:27 AM  3:20 AM  3:20 AM  3:20 AM  
MONOCYTES 
    5 - 13 % 14 (H)   11 Component Latest Ref Rng & Units 9/19/2020 9/19/2020 9/19/2020 9/19/2020  
 
      7:59 PM  1:53 PM  1:53 PM  1:53 PM  
MONOCYTES 
    5 - 13 % Component Latest Ref Rng & Units 9/19/2020  
 
      1:53 PM  
MONOCYTES 
    5 - 13 % 14 (H) Component Latest Ref Rng & Units 9/21/2020 9/20/2020 9/20/2020 9/20/2020  
 
      4:27 AM  3:20 AM  3:20 AM  3:20 AM  
IMMATURE GRANULOCYTES 
    0.0 - 0.5 % 0   1 (H) Component Latest Ref Rng & Units 9/19/2020 9/19/2020 9/19/2020 9/19/2020  
 
      7:59 PM  1:53 PM  1:53 PM  1:53 PM  
IMMATURE GRANULOCYTES 
    0.0 - 0.5 % Component Latest Ref Rng & Units 9/19/2020  
 
      1:53 PM  
IMMATURE GRANULOCYTES 
    0.0 - 0.5 % 0 Component Latest Ref Rng & Units 9/19/2020  
 
      1:53 PM  
CK-MB Index 0.0 - 2.5   2.8 (H) Component Latest Ref Rng & Units 9/19/2020  
 
      1:53 PM  
NT pro-BNP 
    <450 PG/ML 7,426 (H) Component Latest Ref Rng & Units 2020  
 
      3:20 AM  3:20 AM  7:59 PM  1:53 PM  
Troponin-I, Qt. 
    <0.05 ng/mL 0.07 (H)  0.06 (H) 0.07 (H) Component Latest Ref Rng & Units 2020  
 
      3:20 AM  
Anion gap 5 - 15 mmol/L 3 (L) Component Latest Ref Rng & Units 2020  
 
      3:20 AM  
BUN 
    6 - 20 MG/DL 23 (H) Creatinine 
    0.55 - 1.02 MG/DL 1.17 (H) BUN/Creatinine ratio 12 - 20   20 GFR est AA 
    >60 ml/min/1.73m2 51 (L)  
GFR est non-AA 
    >60 ml/min/1.73m2 42 (L) Component Latest Ref Rng & Units 2020  
 
      2:35 PM  
[place] Count 
     >100,000 . . .  
Culture result: 
     ESCHERICHIA COLI (A) Advance Care Planning:  
Does patient have an Advance Directive:  yes; reviewed and current per granddaughter. Was this a readmission? no  
Patient stated reason for the admission: N/A, telephonic assessment completed with patient's granddaughter. Patients top risk factors for readmission: medical condition Interventions to address risk factors: Education provided regarding pain control and fall safety, granddaughter verbalized an understanding. Care Transition Nurse (CTN) contacted the patient's granddaughter, Ephriam Lesches (on [facility]), by telephone to perform post hospital discharge assessment. Verified name and  with granddaughter as identifiers. Provided introduction to self, and explanation of the CTN role. CTN reviewed discharge instructions, medical action plan and red flags with granddaughter who verbalized understanding. Granddaughter given an opportunity to ask questions and does not have any further questions or concerns at this time. The granddaughter agrees to contact the PCP office for questions related to patient's healthcare. Medication reconciliation was performed with patient's granddaughter, who verbalizes understanding of administration of home medications.  Advised obtaining a 90-day supply of all daily and as-needed medications. Referral to Pharm D needed: no  
 
Home Health/Outpatient orders at discharge: PT and SN [facility] Date of initial visit: SN visit planned for 9-22-20 and PT visit planned for 9-23-20 per [address] Durable Medical Equipment ordered at discharge: none [address]: n/a Durable Medical Equipment received: n/a Covid Risk Education Patient has following risk factors of: chronic kidney disease and recent UTI. Education provided regarding infection prevention, and signs and symptoms of COVID-19 and when to seek medical attention with granddaughter who verbalized understanding. Discussed exposure protocols and quarantine From CDC: Are you at higher risk for severe illness?  and given an opportunity for questions and concerns. The granddaughter agrees to contact the COVID-19 hotline [phone] or PCP office for questions related to COVID-19. For more information on steps you can take to protect yourself, see CDC's How to Protect Yourself Patient/family/caregiver given information for [employer] and agrees to enroll no Patient's preferred e-mail: declines Patient's preferred phone number: declines Discussed follow-up appointments. If no appointment was previously scheduled, appointment scheduling offered: yes [facility] follow up appointment(s):  
Future Appointments Date Time Provider [place] 9/25/2020 10:30 AM Uziel Macdonald MD [facility] Non-BS follow up appointment(s): None noted at this time. Plan for follow-up call in 10-14 days based on severity of symptoms and risk factors. CTN provided contact information for future needs. Goals  Attends follow-up appointments as directed. 9-22-20: Patient has [facility] appointment scheduled with Dr. Boom Jean on 9-25-20.  Granddaughter states she is currently providing transportation to/from appointments. Neris Joel Understands red flags post discharge. 9-22-20: Red flags of UTI reviewed with granddaughter and granddaughter verbalizes an understanding. Granddaughter reports patient has generalized weakness. Granddaughter denies patient having fever/chills since discharge, denies nausea/vomiting and states patient's appetite is good. Per granddaughter, patient has 24/7 care givers in her private residence and granddaughter lives across the street. Granddaughter has no red flags to report at this time. Care Transitions Nurse will review red flags again on next phone conversation with patient's granddaughter.  Arnold Franz

## 2020-09-25 ENCOUNTER — VIRTUAL VISIT (OUTPATIENT)
Dept: INTERNAL MEDICINE CLINIC | Age: 85
End: 2020-09-25
Payer: MEDICARE

## 2020-09-25 DIAGNOSIS — N18.30 BENIGN HYPERTENSION WITH CHRONIC KIDNEY DISEASE, STAGE III (HCC): ICD-10-CM

## 2020-09-25 DIAGNOSIS — Z09 HOSPITAL DISCHARGE FOLLOW-UP: Primary | ICD-10-CM

## 2020-09-25 DIAGNOSIS — N39.0 E. COLI UTI (URINARY TRACT INFECTION): ICD-10-CM

## 2020-09-25 DIAGNOSIS — B96.20 E. COLI UTI (URINARY TRACT INFECTION): ICD-10-CM

## 2020-09-25 DIAGNOSIS — E03.4 HYPOTHYROIDISM DUE TO ACQUIRED ATROPHY OF THYROID: ICD-10-CM

## 2020-09-25 DIAGNOSIS — I12.9 BENIGN HYPERTENSION WITH CHRONIC KIDNEY DISEASE, STAGE III (HCC): ICD-10-CM

## 2020-09-25 DIAGNOSIS — G89.4 CHRONIC PAIN SYNDROME: ICD-10-CM

## 2020-09-25 DIAGNOSIS — R53.1 GENERALIZED WEAKNESS: ICD-10-CM

## 2020-09-25 DIAGNOSIS — K21.9 GASTROESOPHAGEAL REFLUX DISEASE WITHOUT ESOPHAGITIS: ICD-10-CM

## 2020-09-25 PROCEDURE — 99495 TRANSJ CARE MGMT MOD F2F 14D: CPT | Performed by: INTERNAL MEDICINE

## 2020-09-25 PROCEDURE — 1111F DSCHRG MED/CURRENT MED MERGE: CPT | Performed by: INTERNAL MEDICINE

## 2020-09-25 PROCEDURE — G8427 DOCREV CUR MEDS BY ELIG CLIN: HCPCS | Performed by: INTERNAL MEDICINE

## 2020-09-25 RX ORDER — ADHESIVE BANDAGE
30 BANDAGE TOPICAL DAILY PRN
COMMUNITY

## 2020-09-25 NOTE — PROGRESS NOTES
Virtual Video Transitional Care Management Progress Note Patient: Jaime Boykin : 1912 PCP: Robb Sandy MD 
 
Date of admission: 20 Date of discharge: 20 Patient was contacted by Transitional Care Management services within two days after her discharge: Yes. This encounter and supporting documentation was reviewed if available. Medication reconciliation was performed today (2020). Assessment/Plan:  
Diagnoses and all orders for this visit: 1. Hospital discharge follow-up -     IN DISCHARGE MEDS RECONCILED W/ CURRENT OUTPATIENT MED LIST 2. E. coli UTI (urinary tract infection) Finished cefdinir. 3. Generalized weakness Likely due to dehydration and inadequate food intake. Instructed granddaughter to try to get her to drink at least 4 glasses of water a day. Try strategy of having her drink and eat a little every 2 hrs. 4. Benign hypertension with chronic kidney disease, stage III ([address] Because of dry mouth, decrease metoprolol 25 mg from 1 tab BID to 1/2 tab BID. Let me know if her SBP is >170 for more than 3 days. 5. Hypothyroidism due to acquired atrophy of thyroid Controlled on levothyroxine. 6. Gastroesophageal reflux disease without esophagitis Causes cough and spitting up of saliva. Since she refuses to take omeprazole, have her take Tums up to 3 times a day. 7. 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 Continue Tylenol Arthritis 650 mg BID with 1 extra tab in afternoon as needed. Increase nortriptyline 10 mg to 2 capsules. If she complains of dry mouth, decrease dose back to 1 capsule. Follow-up and Dispositions · Return in about 1 month (around 10/25/2020), or if symptoms worsen or fail to improve, for HTN, weakness. Subjective: Jaime Boykin is a 80 y.o. female presenting today to [facility] care and for follow-up after being discharged from [facility]. The discharge summary was reviewed. The main problem requiring admission was UTI. Complications during admission: none. Her former PCP was Dr. Donta Lincoln, now retired, last saw 2/18/20. Accompanied by her granddaughter, Dave Tillmna, who provided the HPI and assisted in asking patient questions because she is very hard of hearing. She has hypertension, CKD stage 3-4, hypothyroidism, hyperlipidemia, PVD, chronic low back pain, right hip fracture s/p repair in 3/20,macular degeneration with severe impaired vision, and history of prior stroke. [place] [id number] [place] 9/19-9/21/20 for generalized weakness and debility secondary to UTI. Labs 9/19: creat 1.32, BUN 26, GFR 36, albumin 2.8, normal CBC and TSH. Urine culture: E coli sensitive to all antibiotics tested. Treated with Rocephin and discharged on cefdinir. Had CP on admission; ACS ruled out. HTN was uncontrolled and metoprolol dose was increased to 25 mg BID. Today patient complains of still feeling weak all over and dry mouth since metoprolol dose increased. Was on metoprolol 25 mg 1/2 tab once daily prior to admission. Poor appetite, not drinking enough (1 cup of coffee every morning and only 2 cups of water throughout the day). Has no teeth, does not wear dentures, her food is ground up in a . Says she felt better when she was in the hospital although she ate and drank poorly in the hospital also. Home BP readings: 127/51, 131/66, 162/64, 152/81. Other complaints:  
1) pain all over. Takes Tylenol Arthritis 650 mg 1 tab twice a day, sometimes extra tab in the afternoon. 1) feet burning sensation. Takes nortriptyline 10 mg at night, was told to take 1-2 caps, but only takes 1 cap. 
3) spitting up and cough determined in the past to be from GERD. She refused to take omeprazole. Granddaughter gives her Tums twice a day. Dave Tillman arranges tablets in pillbox. Has around the clock caregivers. Medications marked \"taking\" at this time: 
Home Medications Medication Sig Start Date End Date Taking? Authorizing Provider  
magnesium hydroxide (Funk Milk of Magnesia) 400 mg/5 mL suspension Take 30 mL by mouth daily as needed for Constipation. Yes Provider, Historical  
metoprolol tartrate (LOPRESSOR) 25 mg tablet Take 1 Tab by mouth every twelve (12) hours. 9/21/20  Yes Kyler Rojas MD  
nortriptyline (PAMELOR) 10 mg capsule TAKE 1 TO 2 CAPSULES BY MOUTH AT BEDTIME FOR  SHINGLES  PAIN 9/15/20  Yes Patti Ellis MD  
simvastatin (ZOCOR) 20 mg tablet TAKE ONE TABLET BY MOUTH NIGHTLY 7/15/20  Yes Shruti Ahumada MD  
levothyroxine (SYNTHROID) 50 mcg tablet TAKE 2 TABLETS BY MOUTH ON SATURDAY AND SUNDAY AND TAKE 1 TABLET THE OTHER 5 DAYS. PLEASE GIVE 30 MINUTES BEFORE BREAKFAST. 7/15/20  Yes Shruti Ahumada MD  
aspirin delayed-release 81 mg tablet Take 1 Tab by mouth daily. 7/15/20  Yes Shruti Ahumada MD  
acetaminophen (Tylenol Extra Strength) 500 mg tablet Take 1 Tab by mouth two (2) times a day. 7/15/20  Yes Shruti Ahumada MD  
polyethylene glycol (Miralax) 17 gram/dose powder 1 tablespoon with 8 oz of water daily as needed for constipation not relieved with Colace  Indications: constipation 7/15/20  Yes Shruti Ahumada MD  
vit C,V-Fn-xwdhh-lutein-zeaxan (PreserVision AREDS-2) 203-642-62-1 mg-unit-mg-mg cap capsule Take 1 Cap by mouth two (2) times daily (with meals). 7/15/20  Yes Shruti Ahumada MD  
calcium carbonate (Tums) 200 mg calcium (500 mg) chew Take 1 Tab by mouth two (2) times daily as needed (heartburn or dyspepsia). Peppermint flavored 7/15/20  Yes Shruti Ahumada MD  
  
 
Patient Active Problem List  
Diagnosis Code  Postherpetic neuralgia B02.29  
 Cerebrovascular disease, arteriosclerotic, post-stroke I67.2, Z86.73  
 Asymptomatic PVD (peripheral vascular disease) (HCC) I73.9  Hypercholesterolemia E78.00  Palpitations R00.2  Benign hypertension with chronic kidney disease, stage IV (HCC) I12.9, N18.4  Hypothyroidism due to acquired atrophy of thyroid E03.4  Closed fracture of proximal phalanx of left great toe, initial encounter H15.223Y  Closed right hip fracture ([address] S72.001A [employer] after[employer] for healing traumatic fracture of right upper leg S72. 91XD  Cough R05  
 UTI (urinary tract infection) N39.0  Elevated troponin R79.89  Chest pain R07.9 Objective:  
 
Patient-Reported Vitals 9/25/2020 Patient-Reported Pulse 68 Patient-Reported Temperature 97.3 Patient-Reported Systolic  632 Patient-Reported Diastolic 51 General: alert, cooperative, no distress Mental  status: normal mood, behavior, speech, dress, motor activity, and thought processes, able to follow commands HENT: NCAT Neck: no visualized mass Resp: no respiratory distress Neuro: no gross deficits Skin: no discoloration or lesions of concern on visible areas Psychiatric: normal affect, consistent with stated mood, no evidence of hallucinations Additional exam findings: in wheelchair We discussed the expected course, resolution and complications of the diagnosis(es) in detail. Medication risks, benefits, costs, interactions, and alternatives were discussed as indicated. I advised her to contact the office if her condition worsens, changes or fails to improve as anticipated. She expressed understanding with the diagnosis(es) and plan. THIS VISIT WAS COMPLETED VIRTUALLY USING [employer]. [address] [place] [address] Alvarado, who was evaluated through a synchronous (real-time) audio-video encounter and/or her healthcare decision maker, is aware that it is a billable service, with coverage as determined by her insurance carrier. She provided verbal consent to proceed: Yes, and patient identification was verified.  It was conducted pursuant to the emergency declaration under the [facility] [address] waiver authority and the [employer] General Act. A caregiver was present when appropriate. Ability to conduct physical exam was limited. I was at home. The patient was at home.  
 
 
Heidi Matta MD

## 2020-09-25 NOTE — PROGRESS NOTES
Arnold Stanford  Identified pt with two pt identifiers(name and ). Chief Complaint Patient presents with  
[facility] Follow Up Reviewed record In preparation for visit and have obtained necessary documentation. Advanced directive / living will on file. 1. Have you been to the ER, urgent care clinic or hospitalized since your last visit? [facility] 2020  
 
2. Have you seen or consulted any other health care providers outside of the [address] [place] [address] since your last visit? Include any pap smears or colon screening. Home health Vitals reviewed with provider. Health Maintenance reviewed:  
 
Health Maintenance Due Topic  Statin Therapy  Shingrix Vaccine Age 50> (1 of 2)  Lipid Screen Wt Readings from Last 3 Encounters:  
20 126 lb (57.2 kg) 20 126 lb (57.2 kg) 20 126 lb (57.2 kg) Temp Readings from Last 3 Encounters:  
20 98.7 °F (37.1 °C)  
20 97.7 °F (36.5 °C) (Tympanic) 20 98.5 °F (36.9 °C) BP Readings from Last 3 Encounters:  
20 (!) 145/72  
20 139/75  
20 106/60 Pulse Readings from Last 3 Encounters:  
20 80  
20 95  
20 73 There were no vitals filed for this visit. Learning Assessment:  
:  
 
 
Learning Assessment 2017 PRIMARY LEARNER Patient Patient Patient HIGHEST LEVEL OF EDUCATION - PRIMARY LEARNER  - DID NOT GRADUATE HIGH SCHOOL DID NOT GRADUATE HIGH SCHOOL  
BARRIERS PRIMARY LEARNER HEARING HEARING HEARING  
[address]  CAREGIVER - Yes Yes [facility] William/Brenden Thomas/Jamin -  
CO-LEARNER HIGHEST LEVEL OF EDUCATION - López Fish PRIMARY LANGUAGE ENGLISH ENGLISH ENGLISH  
PRIMARY LANGUAGE CO-LEARNER - - ENGLISH  NEED - - No  
LEARNER PREFERENCE PRIMARY LISTENING LISTENING LISTENING  
 LEARNER PREFERENCE CO-LEARNER - - OTHER (COMMENT) LEARNING SPECIAL TOPICS - - NA  
ANSWERED BY Patient Patient patient RELATIONSHIP SELF SELF SELF Depression Screening:  
:  
 
 
3 most recent PHQ Screens 9/22/2020 PHQ Not Done (No Data) Little interest or pleasure in doing things Not at all Feeling down, depressed, irritable, or hopeless Not at all Total Score PHQ 2 0 Trouble falling or staying asleep, or sleeping too much - Feeling tired or having little energy - Poor appetite, weight loss, or overeating - Feeling bad about yourself - or that you are a failure or have let yourself or your family down - Trouble concentrating on things such as school, work, reading, or watching TV - Moving or speaking so slowly that other people could have noticed; or the opposite being so fidgety that others notice - Thoughts of being better off dead, or hurting yourself in some way -  
PHQ 9 Score - How difficult have these problems made it for you to do your work, take care of your home and get along with others - Fall Risk Assessment:  
:  
 
 
Fall Risk Assessment, last 12 mths 9/22/2020 Able to walk? Yes Fall in past 12 months? Yes Fall with injury? Yes  
Number of falls in past 12 months 5 Fall Risk Score 6 Abuse Screening:  
:  
 
 
Abuse Screening Questionnaire 5/21/2020 10/17/2019 7/13/2018 8/4/2017 8/30/2016 6/18/2015 6/18/2014 Do you ever feel afraid of your partner? N N N N N N N Are you in a relationship with someone who physically or mentally threatens you? N N N N N N N Is it safe for you to go home? Y Y Y Y Y Y Y  
  
 
ADL Screening:  
:  
 
 
ADL Assessment 9/25/2020 Feeding yourself No Help Needed Getting from bed to chair Help Needed Getting dressed Help Needed Bathing or showering Help Needed Walk across the room (includes cane/walker) Help Needed Using the telphone Help Needed Taking your medications Help Needed Preparing meals Help Needed Managing money (expenses/bills) Help Needed Moderately strenuous housework (laundry) Help Needed Shopping for personal items (toiletries/medicines) Help Needed Shopping for groceries Help Needed Driving Help Needed Climbing a flight of stairs Help Needed Getting to places beyond walking distances Help Needed

## 2020-10-07 ENCOUNTER — TELEPHONE (OUTPATIENT)
Dept: INTERNAL MEDICINE CLINIC | Age: 85
End: 2020-10-07

## 2020-10-07 NOTE — TELEPHONE ENCOUNTER
Kalani Gomez 24 with [facility]. Tejas Odonnell would like verbal orders for zeroform to treat blister on pt back. She stated that over the weekend the pt was laying on a heating pad and stayed on to long and got a blister is a cirlce about 2 cm around. Ronold Kanner

## 2020-10-13 DIAGNOSIS — L89.109 PRESSURE INJURY OF SKIN OF BACK, UNSPECIFIED INJURY STAGE: Primary | ICD-10-CM

## 2020-10-13 RX ORDER — CLINDAMYCIN HYDROCHLORIDE 300 MG/1
300 CAPSULE ORAL 3 TIMES DAILY
Qty: 21 CAP | Refills: 0 | Status: SHIPPED | OUTPATIENT
Start: 2020-10-13 | End: 2020-10-20

## 2020-10-13 NOTE — TELEPHONE ENCOUNTER
Home health nurse called to say the wound on patients back has an odor to it and the color has changed to white still 2 x 2 cm and redness around it. May need antibiotic. Please advise.   [phone]

## 2020-10-14 NOTE — TELEPHONE ENCOUNTER
Continue using Xeroform dressing. Change every 3-5 days. If the nurse does not think it will stick this long, what other dressing does she recommend?

## 2020-10-14 NOTE — TELEPHONE ENCOUNTER
Home Health called back and I informed them on the message from Dr. Conrado Contreras. They stated understanding and wanted to know if something else should be called in for debridement. The patient was using zeroform and it feel off and another was never put back on.

## 2020-10-14 NOTE — TELEPHONE ENCOUNTER
Home Health nurse given Dr Jennifer Chambers message, stated wound was beefy red when she first saw pt now white. Suggest using Santyl as it will help with debridement, will need an RX to be sent.

## 2020-12-10 NOTE — TELEPHONE ENCOUNTER
Nurse from [address] [place] [address] Devan Conde called to discuss new wound care order for pt, please return call at [phone] to discuss.

## 2020-12-10 NOTE — TELEPHONE ENCOUNTER
Aubrey Wynn returned phone call. She advised the burn patient has on her back related to a heating pad is now 1/2 cm by 1/2 cm and the santyl has been discontinued. She will be having neosporin and a bandage applied and changed daily.

## 2020-12-23 NOTE — TELEPHONE ENCOUNTER
Verified patients name and date of birth. Spoke with Padmini Franz at [facility] and gave order for urinalysis with urine culture. [facility] will go out tomorrow to obtain specimen. Toño Duttas our fax number. Informed Amandeep Barnhart (on [employer]).

## 2020-12-23 NOTE — TELEPHONE ENCOUNTER
Pt's granddaughter, Maxene Osgood, stated that the pt has been complaining of painful urination for a couple of days and urine has become progressively more cloudy. Home Health nurse requesting an order for a urinalysis to be tested. Please fax appropriate home health (caller unable to provide name) orders as needed. Please give Maxene Osgood a call back at [phone].

## 2020-12-23 NOTE — TELEPHONE ENCOUNTER
Patient receives [facility] services from [facility] phone [phone]. Gabriel Leos (on [employer]IPPA) stated patient has had complaint of dysuria and cloudy urine -urine also has a bit of smell to it. Gabriel Leos encourages patient to drink fluids but patient does not take in a lot of fluids. Can she get an order from [facility] for a urinalysis with culture?

## 2020-12-29 NOTE — TELEPHONE ENCOUNTER
Verified patients name and date of birth. Spoke with Catrina. Called [facility] at [phone].  Was advised results were faxed to our office this am.

## 2020-12-29 NOTE — TELEPHONE ENCOUNTER
Molina Uribe [phone] nurse with [facility] called and stated that they got the results from the urine test done and it was dirty she was wondering if the doctor had seen or ordered any antibiotics for the pt.  She was making she that the doctor received there results

## 2021-01-01 ENCOUNTER — TELEPHONE (OUTPATIENT)
Dept: INTERNAL MEDICINE CLINIC | Age: 86
End: 2021-01-01

## 2021-01-01 ENCOUNTER — VIRTUAL VISIT (OUTPATIENT)
Dept: INTERNAL MEDICINE CLINIC | Age: 86
End: 2021-01-01
Payer: MEDICARE

## 2021-01-01 ENCOUNTER — OFFICE VISIT (OUTPATIENT)
Dept: INTERNAL MEDICINE CLINIC | Age: 86
End: 2021-01-01
Payer: MEDICARE

## 2021-01-01 VITALS
BODY MASS INDEX: 19.15 KG/M2 | HEIGHT: 67 IN | OXYGEN SATURATION: 95 % | HEART RATE: 77 BPM | DIASTOLIC BLOOD PRESSURE: 79 MMHG | RESPIRATION RATE: 16 BRPM | SYSTOLIC BLOOD PRESSURE: 171 MMHG | WEIGHT: 122 LBS | TEMPERATURE: 98.5 F

## 2021-01-01 DIAGNOSIS — N18.4 BENIGN HYPERTENSION WITH CHRONIC KIDNEY DISEASE, STAGE IV (HCC): ICD-10-CM

## 2021-01-01 DIAGNOSIS — R44.3 HALLUCINATIONS: ICD-10-CM

## 2021-01-01 DIAGNOSIS — I73.9 ASYMPTOMATIC PVD (PERIPHERAL VASCULAR DISEASE) (HCC): ICD-10-CM

## 2021-01-01 DIAGNOSIS — F22 DELUSION OF INFESTATION (HCC): ICD-10-CM

## 2021-01-01 DIAGNOSIS — R68.2 DRY MOUTH: ICD-10-CM

## 2021-01-01 DIAGNOSIS — I12.9 BENIGN HYPERTENSION WITH CHRONIC KIDNEY DISEASE, STAGE IV (HCC): ICD-10-CM

## 2021-01-01 DIAGNOSIS — L89.159 PRESSURE INJURY OF SKIN OF SACRAL REGION, UNSPECIFIED INJURY STAGE: ICD-10-CM

## 2021-01-01 DIAGNOSIS — J30.89 NON-SEASONAL ALLERGIC RHINITIS, UNSPECIFIED TRIGGER: ICD-10-CM

## 2021-01-01 DIAGNOSIS — E78.00 HYPERCHOLESTEROLEMIA: ICD-10-CM

## 2021-01-01 DIAGNOSIS — E03.4 HYPOTHYROIDISM DUE TO ACQUIRED ATROPHY OF THYROID: ICD-10-CM

## 2021-01-01 DIAGNOSIS — R53.1 WEAKNESS GENERALIZED: Primary | ICD-10-CM

## 2021-01-01 DIAGNOSIS — B02.29 POSTHERPETIC NEURALGIA: ICD-10-CM

## 2021-01-01 DIAGNOSIS — R00.2 PALPITATIONS: Primary | ICD-10-CM

## 2021-01-01 DIAGNOSIS — R00.0 TACHYCARDIA: Primary | ICD-10-CM

## 2021-01-01 DIAGNOSIS — F22 DELUSION OF INFESTATION (HCC): Primary | ICD-10-CM

## 2021-01-01 DIAGNOSIS — R60.0 BILATERAL EDEMA OF LOWER EXTREMITY: ICD-10-CM

## 2021-01-01 DIAGNOSIS — R41.0 CONFUSION: ICD-10-CM

## 2021-01-01 DIAGNOSIS — N30.00 ACUTE CYSTITIS WITHOUT HEMATURIA: Primary | ICD-10-CM

## 2021-01-01 DIAGNOSIS — S72.001D CLOSED FRACTURE OF RIGHT HIP WITH ROUTINE HEALING, SUBSEQUENT ENCOUNTER: ICD-10-CM

## 2021-01-01 DIAGNOSIS — Z00.00 MEDICARE ANNUAL WELLNESS VISIT, SUBSEQUENT: Primary | ICD-10-CM

## 2021-01-01 PROCEDURE — G0439 PPPS, SUBSEQ VISIT: HCPCS | Performed by: PHYSICIAN ASSISTANT

## 2021-01-01 PROCEDURE — G8420 CALC BMI NORM PARAMETERS: HCPCS | Performed by: INTERNAL MEDICINE

## 2021-01-01 PROCEDURE — G8510 SCR DEP NEG, NO PLAN REQD: HCPCS | Performed by: INTERNAL MEDICINE

## 2021-01-01 PROCEDURE — G8432 DEP SCR NOT DOC, RNG: HCPCS | Performed by: PHYSICIAN ASSISTANT

## 2021-01-01 PROCEDURE — 1090F PRES/ABSN URINE INCON ASSESS: CPT | Performed by: INTERNAL MEDICINE

## 2021-01-01 PROCEDURE — G8427 DOCREV CUR MEDS BY ELIG CLIN: HCPCS | Performed by: INTERNAL MEDICINE

## 2021-01-01 PROCEDURE — 99443 PR PHYS/QHP TELEPHONE EVALUATION 21-30 MIN: CPT | Performed by: INTERNAL MEDICINE

## 2021-01-01 PROCEDURE — 1101F PT FALLS ASSESS-DOCD LE1/YR: CPT | Performed by: PHYSICIAN ASSISTANT

## 2021-01-01 PROCEDURE — G8427 DOCREV CUR MEDS BY ELIG CLIN: HCPCS | Performed by: PHYSICIAN ASSISTANT

## 2021-01-01 PROCEDURE — G8432 DEP SCR NOT DOC, RNG: HCPCS | Performed by: INTERNAL MEDICINE

## 2021-01-01 PROCEDURE — 1101F PT FALLS ASSESS-DOCD LE1/YR: CPT | Performed by: INTERNAL MEDICINE

## 2021-01-01 PROCEDURE — 1090F PRES/ABSN URINE INCON ASSESS: CPT | Performed by: PHYSICIAN ASSISTANT

## 2021-01-01 PROCEDURE — 99442 PR PHYS/QHP TELEPHONE EVALUATION 11-20 MIN: CPT | Performed by: PHYSICIAN ASSISTANT

## 2021-01-01 PROCEDURE — G8536 NO DOC ELDER MAL SCRN: HCPCS | Performed by: INTERNAL MEDICINE

## 2021-01-01 PROCEDURE — 99214 OFFICE O/P EST MOD 30 MIN: CPT | Performed by: INTERNAL MEDICINE

## 2021-01-01 RX ORDER — METOPROLOL TARTRATE 25 MG/1
12.5 TABLET, FILM COATED ORAL EVERY 12 HOURS
Qty: 90 TAB | Refills: 1 | Status: SHIPPED | OUTPATIENT
Start: 2021-01-01 | End: 2021-01-01

## 2021-01-01 RX ORDER — QUETIAPINE FUMARATE 25 MG/1
25 TABLET, FILM COATED ORAL
Qty: 90 TABLET | Refills: 0 | Status: SHIPPED | OUTPATIENT
Start: 2021-01-01 | End: 2021-01-01

## 2021-01-01 RX ORDER — NORTRIPTYLINE HYDROCHLORIDE 10 MG/1
CAPSULE ORAL
Qty: 180 CAPSULE | Refills: 1 | Status: SHIPPED | OUTPATIENT
Start: 2021-01-01

## 2021-01-01 RX ORDER — LEVOTHYROXINE SODIUM 50 UG/1
TABLET ORAL
Qty: 117 TABLET | Refills: 3 | Status: SHIPPED | OUTPATIENT
Start: 2021-01-01

## 2021-01-01 RX ORDER — METOPROLOL TARTRATE 25 MG/1
25 TABLET, FILM COATED ORAL 2 TIMES DAILY
Qty: 180 TABLET | Refills: 3 | Status: SHIPPED | OUTPATIENT
Start: 2021-01-01

## 2021-01-01 RX ORDER — CEFDINIR 300 MG/1
300 CAPSULE ORAL 2 TIMES DAILY
Qty: 14 CAPSULE | Refills: 0 | Status: SHIPPED | OUTPATIENT
Start: 2021-01-01 | End: 2021-01-01

## 2021-01-01 RX ORDER — QUETIAPINE FUMARATE 25 MG/1
25 TABLET, FILM COATED ORAL
Qty: 90 TABLET | Refills: 0 | Status: SHIPPED | OUTPATIENT
Start: 2021-01-01

## 2021-01-01 RX ORDER — SIMVASTATIN 20 MG/1
TABLET, FILM COATED ORAL
Qty: 90 TABLET | Refills: 3 | Status: SHIPPED | OUTPATIENT
Start: 2021-01-01

## 2021-02-03 NOTE — TELEPHONE ENCOUNTER
Requested Prescriptions     Pending Prescriptions Disp Refills    metoprolol tartrate (LOPRESSOR) 25 mg tablet 60 Tab 2     Sig: Take 1 Tab by mouth every twelve (12) hours. Pharmacy needs 90  days of RX.

## 2021-02-03 NOTE — TELEPHONE ENCOUNTER
PCP: Bridget Talamantes MD     Last appt: 9/25/2020   No future appointments. Requested Prescriptions     Pending Prescriptions Disp Refills    metoprolol tartrate (LOPRESSOR) 25 mg tablet 60 Tab 2     Sig: Take 1 Tab by mouth every twelve (12) hours.

## 2021-02-17 NOTE — TELEPHONE ENCOUNTER
Received call from [facility] with [employer] [facility]([phone]). Patients niece is concerned that patient may another UTI. Patient does not have a fever and her WSWNL. Niece states she \"seems slower\" than usual. Per Dr Arndt Situ it is ok for [facility] to obtain a urine for a urinalysis/culture. Pansy Shivers notiifed.

## 2021-02-26 NOTE — PROGRESS NOTES
This is the Subsequent Medicare Annual Wellness Exam, performed 12 months or more after the Initial AWV or the last Subsequent AWV I have reviewed the patient's medical history in detail and updated the computerized patient record. Depression Risk Factor Screening:  
 
3 most recent PHQ Screens 2/26/2021 PHQ Not Done Patient Decline Little interest or pleasure in doing things - Feeling down, depressed, irritable, or hopeless - Total Score PHQ 2 - Trouble falling or staying asleep, or sleeping too much - Feeling tired or having little energy - Poor appetite, weight loss, or overeating - Feeling bad about yourself - or that you are a failure or have let yourself or your family down - Trouble concentrating on things such as school, work, reading, or watching TV - Moving or speaking so slowly that other people could have noticed; or the opposite being so fidgety that others notice - Thoughts of being better off dead, or hurting yourself in some way -  
PHQ 9 Score - How difficult have these problems made it for you to do your work, take care of your home and get along with others - Alcohol Risk Screen Do you average more than 1 drink per night or more than 7 drinks a week:  No 
 
On any one occasion in the past three months have you have had more than 3 drinks containing alcohol:  No 
 
 
 
Functional Ability and Level of Safety:  
 Hearing: The patient wears hearing aids. Activities of Daily Living: The home contains: handrails and grab bars Patient needs help with:  phone, transportation, shopping, preparing meals, housework, managing medications and managing money Ambulation: with difficulty, uses a walker and home bound Fall Risk: 
Fall Risk Assessment, last 12 mths 2/26/2021 Able to walk? Yes Fall in past 12 months? 1 Do you feel unsteady? 1 Are you worried about falling 1 Number of falls in past 12 months 2 Fall with injury?  0  
 
 Abuse Screen: 
Patient is not abused 
 well taken care of by grand-daughter Cognitive Screening Has your family/caregiver stated any concerns about your memory: yes - Known hx of mild cognitive impairment given virtual visit no cognitive screening has been done. Assessment/Plan Education and counseling provided: 
Are appropriate based on today's review and evaluation End-of-Life planning (with patient's consent) Diagnoses and all orders for this visit: 
 
1. Medicare annual wellness visit, subsequent 2. Non-seasonal allergic rhinitis, unspecified trigger 3. Bilateral edema of lower extremity 4. Dry mouth 5. Hypothyroidism due to acquired atrophy of thyroid 6. Benign hypertension with chronic kidney disease, stage IV ([address] 7. Asymptomatic PVD (peripheral vascular disease) ([address] 8. Closed fracture of right hip with routine healing, subsequent encounter Health Maintenance Due Health Maintenance Due Topic Date Due  
 COVID-19 Vaccine (1 of 2) 02[birth year]14/[birth year]28  Shingrix Vaccine Age 50> (1 of 2) 02/14/1962  Lipid Screen  01/26/2019  GLAUCOMA SCREENING Q2Y  04/04/2021 Patient Care Team  
Patient Care Team: 
Ana Rodriguez MD as PCP - General (Internal Medicine) Ana Rodriguez MD as PCP - [facility] Empaneled Provider History Patient Active Problem List  
Diagnosis Code  Postherpetic neuralgia B02.29  
 Cerebrovascular disease, arteriosclerotic, post-stroke I67.2, Z86.73  
 Asymptomatic PVD (peripheral vascular disease) ([facility]) I73.9  Hypercholesterolemia E78.00  Palpitations R00.2  Benign hypertension with chronic kidney disease, stage IV (HCC) I12.9, N18.4  Hypothyroidism due to acquired atrophy of thyroid E03.4  Closed fracture of proximal phalanx of left great toe, initial encounter N19.714Y  Closed right hip fracture ([address] S72.001A [facility] Orthopedic aftercare for healing traumatic fracture of right upper leg S72. 91XD  Cough R05  UTI (urinary tract infection) N39.0  Elevated troponin R77.8  Chest pain R07.9 Past Medical History:  
Diagnosis Date  Chronic kidney disease  CKD (chronic kidney disease) stage 3, GFR 30-59 ml/min 5/17/2010  Hypercholesteremia  Hypertension  Post herpetic neuralgia  Stroke [facility]  Thyroid disease Past Surgical History:  
Procedure Laterality Date  HX APPENDECTOMY  HX HYSTERECTOMY  1947  
 one ovary out Current Outpatient Medications Medication Sig Dispense Refill  metoprolol tartrate (LOPRESSOR) 25 mg tablet Take 0.5 Tabs by mouth every twelve (12) hours. 90 Tab 1  
 magnesium hydroxide (F[employer] Milk of Magnesia) 400 mg/5 mL suspension Take 30 mL by mouth daily as needed for Constipation.  nortriptyline (PAMELOR) 10 mg capsule TAKE 1 TO 2 CAPSULES BY MOUTH AT BEDTIME FOR  SHINGLES  PAIN 180 Cap 0  
 simvastatin (ZOCOR) 20 mg tablet TAKE ONE TABLET BY MOUTH NIGHTLY 90 Tab 3  
 levothyroxine (SYNTHROID) 50 mcg tablet TAKE 2 TABLETS BY MOUTH ON SATURDAY AND SUNDAY AND TAKE 1 TABLET THE OTHER 5 DAYS. PLEASE GIVE 30 MINUTES BEFORE BREAKFAST. 117 Tab 3  
 aspirin delayed-release 81 mg tablet Take 1 Tab by mouth daily. 90 Tab 3  
 acetaminophen (Tylenol Extra Strength) 500 mg tablet Take 1 Tab by mouth two (2) times a day. 100 Tab 5  polyethylene glycol (Miralax) 17 gram/dose powder 1 tablespoon with 8 oz of water daily as needed for constipation not relieved with Colace  Indications: constipation 510 g 0  
 vit C,I-Le-lpgao-lutein-zeaxan (PreserVision AREDS-2) 786-268-77-1 mg-unit-mg-mg cap capsule Take 1 Cap by mouth two (2) times daily (with meals). 180 Cap 3  
 calcium carbonate (Tums) 200 mg calcium (500 mg) chew Take 1 Tab by mouth two (2) times daily as needed (heartburn or dyspepsia). Peppermint flavored 180 Tab 3 Allergies Allergen Reactions  Lyrica [Pregabalin] Other (comments) Couldn't function  Macrobid [Nitrofurantoin Monohyd/M-Cryst] Other (comments)  
  sick  Neurontin [Gabapentin] Other (comments) groggy Family History Problem Relation Age of Onset  Cancer Father Social History Tobacco Use  Smoking status: Never Smoker  Smokeless tobacco: Never Used Substance Use Topics  Alcohol use: No  
 
 
Yael Bearden, who was evaluated through a synchronous (real-time) audio-video encounter, and/or her healthcare decision maker, is aware that it is a billable service, with coverage as determined by her insurance carrier. She provided verbal consent to proceed: Yes, and patient identification was verified. It was conducted pursuant to the emergency declaration under the [facility], [address] [place] [address] authority and the [employer] Act. A caregiver was present when appropriate. Ability to conduct physical exam was limited. I was in the office. The patient was at home. Rommel Baig PA-C Yael Bearden is a 80 y.o. female, evaluated via audio-only technology on 2/26/2021 for Annual Wellness Visit, Allergies, and Ankle swelling Christian Clements Assessment & Plan:  
Diagnoses and all orders for this visit: 
 
1. Medicare annual wellness visit, subsequent 2. Non-seasonal allergic rhinitis, unspecified trigger Begin Flonase and Mucinex daily 3. Bilateral edema of lower extremity Advised use the compression stockings PRN 4. Dry mouth Humidifier at night 5. Hypothyroidism due to acquired atrophy of thyroid Continues daily with synthroid 6. Benign hypertension with chronic kidney disease, stage IV ([address] At goal  
7. Asymptomatic PVD (peripheral vascular disease) ([address] Compression stockings as needed 8. Closed fracture of right hip with routine healing, subsequent encounter Doing well pain improved 12 Subjective: Pt evaluated through phone call visit with grand-daughter answering most questions. She reports a few weeks of sinus congestion and rhinorrhea with clear drainage. Believes it to be related heat in house. No pain. No ear pain, sore throat. No fevers. Intermittent BL ankle swelling. She does her exercises daily. Goes down when she puts her feet up. No pain. Hx of R hip fx and surgery March of 2020. Has dry mouth in the AM. Sleeps with her mouth open. Drinking plenty of water. Prior to Admission medications Medication Sig Start Date End Date Taking? Authorizing Provider  
metoprolol tartrate (LOPRESSOR) 25 mg tablet Take 0.5 Tabs by mouth every twelve (12) hours. 2/3/21  Yes Jagdish Scherer MD  
magnesium hydroxide ([employer] [facility] Milk of Magnesia) 400 mg/5 mL suspension Take 30 mL by mouth daily as needed for Constipation. Yes Provider, Historical  
nortriptyline (PAMELOR) 10 mg capsule TAKE 1 TO 2 CAPSULES BY MOUTH AT BEDTIME FOR  SHINGLES  PAIN 9/15/20  Yes Jagdish Scherer MD  
simvastatin (ZOCOR) 20 mg tablet TAKE ONE TABLET BY MOUTH NIGHTLY 7/15/20  Yes Ciara Sexton MD  
levothyroxine (SYNTHROID) 50 mcg tablet TAKE 2 TABLETS BY MOUTH ON SATURDAY AND SUNDAY AND TAKE 1 TABLET THE OTHER 5 DAYS. PLEASE GIVE 30 MINUTES BEFORE BREAKFAST. 7/15/20  Yes Ciara Sexton MD  
aspirin delayed-release 81 mg tablet Take 1 Tab by mouth daily. 7/15/20  Yes Ciara Sexton MD  
acetaminophen (Tylenol Extra Strength) 500 mg tablet Take 1 Tab by mouth two (2) times a day. 7/15/20  Yes Ciara Sexton MD  
polyethylene glycol (Miralax) 17 gram/dose powder 1 tablespoon with 8 oz of water daily as needed for constipation not relieved with Colace  Indications: constipation 7/15/20  Yes Ciara Sexton MD  
vit C,V-Tp-abnao-lutein-zeaxan (PreserVision AREDS-2) 454-296-15-1 mg-unit-mg-mg cap capsule Take 1 Cap by mouth two (2) times daily (with meals).  7/15/20  Yes Ciara Sexton MD  
 calcium carbonate (Tums) 200 mg calcium (500 mg) chew Take 1 Tab by mouth two (2) times daily as needed (heartburn or dyspepsia). Peppermint flavored 7/15/20  Yes Dmitri Brock MD  
collagenase [facility]) 250 unit/gram ointment Apply  to affected area daily. 10/14/20 2/26/21  Sandy Greene MD  
 
Patient Active Problem List  
Diagnosis Code  Postherpetic neuralgia B02.29  
 Cerebrovascular disease, arteriosclerotic, post-stroke I67.2, Z86.73  
 Asymptomatic PVD (peripheral vascular disease) ([facility]) I73.9  Hypercholesterolemia E78.00  Palpitations R00.2  Benign hypertension with chronic kidney disease, stage IV ([facility]) I12.9, N18.4  Hypothyroidism due to acquired atrophy of thyroid E03.4  Closed fracture of proximal phalanx of left great toe, initial encounter B90.252Z  Closed right hip fracture ([facility] S72.001A [facility] Orthopedic aftercare for healing traumatic fracture of right upper leg S72. 91XD  Cough R05  
 UTI (urinary tract infection) N39.0  Elevated troponin R77.8  Chest pain R07.9 Patient Active Problem List  
 Diagnosis Date Noted  Chest pain 09/20/2020  Cough 09/19/2020  UTI (urinary tract infection) 09/19/2020  Elevated troponin 09/19/2020 [facility] Orthopedic aftercare for healing traumatic fracture of right upper leg 04/06/2020  Closed right hip fracture ([facility] 03/04/2020  Closed fracture of proximal phalanx of left great toe, initial encounter 02/06/2020  Hypothyroidism due to acquired atrophy of thyroid 01/17/2017  Benign hypertension with chronic kidney disease, stage IV ([address] 06/18/2015  Palpitations 11/19/2014  Postherpetic neuralgia 05/17/2010  Cerebrovascular disease, arteriosclerotic, post-stroke 05/17/2010  Asymptomatic PVD (peripheral vascular disease) ([address] 05/17/2010  Hypercholesterolemia 05/17/2010 Current Outpatient Medications Medication Sig Dispense Refill  metoprolol tartrate (LOPRESSOR) 25 mg tablet Take 0.5 Tabs by mouth every twelve (12) hours. 90 Tab 1  
 magnesium hydroxide (Funk Milk of Magnesia) 400 mg/5 mL suspension Take 30 mL by mouth daily as needed for Constipation.  nortriptyline (PAMELOR) 10 mg capsule TAKE 1 TO 2 CAPSULES BY MOUTH AT BEDTIME FOR  SHINGLES  PAIN 180 Cap 0  
 simvastatin (ZOCOR) 20 mg tablet TAKE ONE TABLET BY MOUTH NIGHTLY 90 Tab 3  
 levothyroxine (SYNTHROID) 50 mcg tablet TAKE 2 TABLETS BY MOUTH ON SATURDAY AND SUNDAY AND TAKE 1 TABLET THE OTHER 5 DAYS. PLEASE GIVE 30 MINUTES BEFORE BREAKFAST. 117 Tab 3  
 aspirin delayed-release 81 mg tablet Take 1 Tab by mouth daily. 90 Tab 3  
 acetaminophen (Tylenol Extra Strength) 500 mg tablet Take 1 Tab by mouth two (2) times a day. 100 Tab 5  polyethylene glycol (Miralax) 17 gram/dose powder 1 tablespoon with 8 oz of water daily as needed for constipation not relieved with Colace  Indications: constipation 510 g 0  
 vit C,E-Ny-cctzw-lutein-zeaxan (PreserVision AREDS-2) [id number] mg-unit-mg-mg cap capsule Take 1 Cap by mouth two (2) times daily (with meals). 180 Cap 3  
 calcium carbonate (Tums) 200 mg calcium (500 mg) chew Take 1 Tab by mouth two (2) times daily as needed (heartburn or dyspepsia). Peppermint flavored 180 Tab 3 Allergies Allergen Reactions  Lyrica [Pregabalin] Other (comments) Couldn't function  Macrobid [Nitrofurantoin Monohyd/M-Cryst] Other (comments)  
  sick  Neurontin [Gabapentin] Other (comments) groggy Past Medical History:  
Diagnosis Date  Chronic kidney disease  CKD (chronic kidney disease) stage 3, GFR 30-59 ml/min 5/17/2010  Hypercholesteremia  Hypertension  Post herpetic neuralgia  St[facility] ([facility]  Thyroid disease Past Surgical History:  
Procedure Laterality Date  HX APPENDECTOMY  HX HYSTERECTOMY  1947  
 one ovary out Family History Problem Relation Age of Onset  Cancer Father Social History Tobacco Use  Smoking status: Never Smoker  Smokeless tobacco: Never Used Substance Use Topics  Alcohol use: No  
 
 
Review of Systems Constitutional: Negative for chills, fever and malaise/fatigue. HENT: Positive for congestion. Respiratory: Negative for cough and wheezing. Cardiovascular: Positive for leg swelling. Gastrointestinal: Negative for abdominal pain, constipation, diarrhea, nausea and vomiting. Musculoskeletal: Negative for back pain and myalgias. Neurological: Negative for headaches. Patient-Reported Vitals 2/26/2021 Patient-Reported Pulse 76 Patient-Reported Temperature 97.7 Patient-Reported Systolic  580 Patient-Reported Diastolic 69 Catherine Ybarra, who was evaluated through a patient-initiated, synchronous (real-time) audio only encounter, and/or her healthcare decision maker, is aware that it is a billable service, with coverage as determined by her insurance carrier. She provided verbal consent to proceed: Yes. She has not had a related appointment within my department in the past 7 days or scheduled within the next 24 hours. Total Time: minutes: 21-30 minutes Mateo Bowers PA-C

## 2021-02-26 NOTE — PROGRESS NOTES
Adrian Hodgson  Identified pt with two pt identifiers(name and ). Chief Complaint Patient presents with Roshni Adnrews Annual Wellness Visit  Allergies  Ankle swelling Reviewed record In preparation for visit and have obtained necessary documentation. Advanced directive / living will on file. 1. Have you been to the ER, urgent care clinic or hospitalized since your last visit? No  
 
2. Have you seen or consulted any other health care providers outside of the [address] [place] [address] since your last visit? Include any pap smears or colon screening. No 
 
Vitals reviewed with provider. Health Maintenance reviewed:  
 
Health Maintenance Due Topic  COVID-19 Vaccine (1 of 2)  Shingrix Vaccine Age 50> (1 of 2)  Lipid Screen  GLAUCOMA SCREENING Q2Y Wt Readings from Last 3 Encounters:  
20 126 lb (57.2 kg) 20 126 lb (57.2 kg) 20 126 lb (57.2 kg) Temp Readings from Last 3 Encounters:  
20 98.7 °F (37.1 °C)  
20 97.7 °F (36.5 °C) (Tympanic) 20 98.5 °F (36.9 °C) BP Readings from Last 3 Encounters:  
20 (!) 145/72  
20 139/75  
20 106/60 Pulse Readings from Last 3 Encounters:  
20 80  
20 95  
20 73 There were no vitals filed for this visit. Learning Assessment:  
:  
 
 
Learning Assessment 2017 PRIMARY LEARNER Patient Patient Patient HIGHEST LEVEL OF EDUCATION - PRIMARY LEARNER  - DID NOT GRADUATE HIGH SCHOOL DID NOT GRADUATE HIGH SCHOOL  
BARRIERS PRIMARY LEARNER HEARING HEARING HEARING  
[address] CAREGIVER - Yes Yes [facility] William/Brenden Thomas/Jamin -  
CO-LEARNER HIGHEST LEVEL OF EDUCATION - López Fish PRIMARY LANGUAGE ENGLISH ENGLISH ENGLISH  
PRIMARY LANGUAGE CO-LEARNER - - ENGLISH  NEED - - No  
 LEARNER PREFERENCE PRIMARY LISTENING LISTENING LISTENING  
LEARNER PREFERENCE CO-LEARNER - - OTHER (COMMENT) LEARNING SPECIAL TOPICS - - NA  
ANSWERED BY Patient Patient patient RELATIONSHIP SELF SELF SELF Depression Screening:  
:  
 
 
3 most recent PHQ Screens 9/22/2020 PHQ Not Done (No Data) Little interest or pleasure in doing things Not at all Feeling down, depressed, irritable, or hopeless Not at all Total Score PHQ 2 0 Trouble falling or staying asleep, or sleeping too much - Feeling tired or having little energy - Poor appetite, weight loss, or overeating - Feeling bad about yourself - or that you are a failure or have let yourself or your family down - Trouble concentrating on things such as school, work, reading, or watching TV - Moving or speaking so slowly that other people could have noticed; or the opposite being so fidgety that others notice - Thoughts of being better off dead, or hurting yourself in some way -  
PHQ 9 Score - How difficult have these problems made it for you to do your work, take care of your home and get along with others - Fall Risk Assessment:  
:  
 
 
Fall Risk Assessment, last 12 mths 2/26/2021 Able to walk? Yes Fall in past 12 months? 1 Do you feel unsteady? 1 Are you worried about falling 1 Number of falls in past 12 months 2 Fall with injury? 0 Abuse Screening:  
:  
 
 
Abuse Screening Questionnaire 2/26/2021 5/21/2020 10/17/2019 7/13/2018 8/4/2017 8/30/2016 6/18/2015 Do you ever feel afraid of your partner? N N N N N N N Are you in a relationship with someone who physically or mentally threatens you? N N N N N N N Is it safe for you to go home? Y Y Y Y Y Y Y  
  
 
ADL Screening:  
:  
 
 
ADL Assessment 2/26/2021 Feeding yourself No Help Needed Getting from bed to chair Help Needed Getting dressed Help Needed Bathing or showering Help Needed Walk across the room (includes cane/walker) Help Needed Using the telphone Help Needed Taking your medications Help Needed Preparing meals Help Needed Managing money (expenses/bills) Help Needed Moderately strenuous housework (laundry) Help Needed Shopping for personal items (toiletries/medicines) Help Needed Shopping for groceries Help Needed Driving Help Needed Climbing a flight of stairs Help Needed Getting to places beyond walking distances Help Needed

## 2021-02-26 NOTE — PATIENT INSTRUCTIONS
Medicare Wellness Visit, Female The best way to live healthy is to have a lifestyle where you eat a well-balanced diet, exercise regularly, limit alcohol use, and quit all forms of tobacco/nicotine, if applicable. Regular preventive services are another way to keep healthy. Preventive services (vaccines, screening tests, monitoring & exams) can help personalize your care plan, which helps you manage your own care. Screening tests can find health problems at the earliest stages, when they are easiest to treat. [facility] follows the current, evidence-based guidelines published by the [employer] [facility]) when recommending preventive services for our patients. Because we follow these guidelines, sometimes recommendations change over time as research supports it. (For example, mammograms used to be recommended annually. Even though Medicare will still pay for an annual mammogram, the newer guidelines recommend a mammogram every two years for women of average risk). Of course, you and your doctor may decide to screen more often for some diseases, based on your risk and your co-morbidities (chronic disease you are already diagnosed with). Preventive services for you include: - Medicare offers their members a free annual wellness visit, which is time for you and your primary care provider to discuss and plan for your preventive service needs. Take advantage of this benefit every year! 
-All adults over the age of 72 should receive the recommended pneumonia vaccines. Current USPSTF guidelines recommend a series of two vaccines for the best pneumonia protection.  
-All adults should have a flu vaccine yearly and a tetanus vaccine every 10 years.  
-All adults age 48 and older should receive the shingles vaccines (series of two vaccines). -All adults age 38-68 who are overweight should have a diabetes screening test once every three years. -All adults born between [birth year] and 1965 should be screened once for Hepatitis C. 
-Other screening tests and preventive services for persons with diabetes include: an eye exam to screen for diabetic retinopathy, a kidney function test, a foot exam, and stricter control over your cholesterol.  
-Cardiovascular screening for adults with routine risk involves an electrocardiogram (ECG) at intervals determined by your doctor.  
-Colorectal cancer screenings should be done for adults age 54-65 with no increased risk factors for colorectal cancer. There are a number of acceptable methods of screening for this type of cancer. Each test has its own benefits and drawbacks. Discuss with your doctor what is most appropriate for you during your annual wellness visit. The different tests include: colonoscopy (considered the best screening method), a fecal occult blood test, a fecal DNA test, and sigmoidoscopy. 
 
-A bone mass density test is recommended when a woman turns 65 to screen for osteoporosis. This test is only recommended one time, as a screening. Some providers will use this same test as a disease monitoring tool if you already have osteoporosis. -Breast cancer screenings are recommended every other year for women of normal risk, age 54-69. 
-Cervical cancer screenings for women over age 72 are only recommended with certain risk factors. Here is a list of your current Health Maintenance items (your personalized list of preventive services) with a due date: 
Health Maintenance Due Topic Date Due  
 COVID-19 Vaccine (1 of 2) 02/14/1928  Shingles Vaccine (1 of 2) 02/14/1962  Cholesterol Test   01/26/2019  Glaucoma Screening   04/04/2021

## 2021-03-10 NOTE — TELEPHONE ENCOUNTER
Inform patient or her caretaker, Eyad Robbins, that her urine test done on 3/2/21 returned normal.  No urinary tract infection was seen.

## 2021-03-10 NOTE — TELEPHONE ENCOUNTER
Verified patients name and date of birth. Bebe Thomas(on HIPPA) results per PCP. Rere Linton stated understanding.

## 2021-03-29 NOTE — TELEPHONE ENCOUNTER
Cesar Gaspar states pt's confusion comes and goes a little more often. Her eyes have been getting worse. Informed that this is natural as people age but I would seen message to Dr Josr Zarate to see if she had any other suggestion.

## 2021-03-29 NOTE — TELEPHONE ENCOUNTER
Giles Riddle called and wanted to talk to the nurse about some things that she has noticed that have changed about the pt. The pt has been more confused and is now starting to talk about her mom being with her.  Giles Riddle wants to know does this mean anything is there something that she should be doing or if there is something wrong

## 2021-03-30 NOTE — TELEPHONE ENCOUNTER
I agree with what was told to Yang Frank. If confusion suddenly worsens, then it would be good to get a urine sample to make sure she is not having a UTI.

## 2021-03-31 NOTE — TELEPHONE ENCOUNTER
Oanh King was informed if confusion suddenly worsens, then it would be good to get a urine sample. She stated she will.

## 2021-05-28 NOTE — TELEPHONE ENCOUNTER
Santos Robledo called and would like to have a nurse come out to the nurse and check the pt urine. She believes that she has another UTI. She asked to have someone call her prior to 1145.

## 2021-05-28 NOTE — TELEPHONE ENCOUNTER
Returned phone call to Gopal Villar on behalf of pt, HIPAA verified by two patient identifiers. Advised that she may [facility] so that they can go out to the pts house and check for UTI. Gopal Villar verified understanding.

## 2021-06-01 NOTE — TELEPHONE ENCOUNTER
Since it is hard to bring her into clinic, I can send an antibiotic prescription to her pharmacy based on her last UTI results. If she does not improve after taking the antibiotic, she will need to come to clinic to be seen. A prescription has been sent to [facility] DR JARRETT VANG.

## 2021-06-01 NOTE — TELEPHONE ENCOUNTER
I called April Nguyen (on HIPPA) and verified the patient by name and date of birth. She informed me that the patients urine is cloudy and scratching and her private area all the time. Does not know if it is the coming of a UTI or a yeast infection. Already tried to call [facility] and they cannot come to the patient due to the location. Medicare stated they need to call the PCP to take care of this.

## 2021-06-01 NOTE — TELEPHONE ENCOUNTER
Cinthia Corona is calling for Juan Daniel Burks she said that her grandmother has a Uti she called her insurance and they said the Doctors has to send someone out to patient home. [facility] and they dont service there area .   Gopal Villar number is [phone]

## 2021-06-01 NOTE — TELEPHONE ENCOUNTER
I called Karen Juárez and verified the patient by name and date of birth. I informed her on the message from Dr. Magui Kumari. She stated understanding and had no further questions.

## 2021-06-11 NOTE — TELEPHONE ENCOUNTER
Tell her to schedule a visit with me to discuss this delusional infestation.   The appointment can be in person or virtual.

## 2021-06-11 NOTE — TELEPHONE ENCOUNTER
Moris Paige wants the nurse to give her a call about her grandmother stating she is seeing bugs everywhere.  Rodrigo Lees number is [phone]

## 2021-06-11 NOTE — TELEPHONE ENCOUNTER
I called Christopher Small and verified the patient by name and date of birth. I informed her on the message from Dr. Kiko Bishop. She stated understanding and scheduled an appointment for next week. She stated she is at her withes end and does not know if this will help. Thinks it may be a mental situation.

## 2021-06-11 NOTE — TELEPHONE ENCOUNTER
Paola Peña (on HIPPA) and verified the patient by name and date of birth. She informed me that she thinks it's all in the patients head. The patient seems to think that she has bugs, lice or something. They have cleaned the house from head to toe, washed the patient from head to toe (including hair). No one else is complaining about it. Thinks there are bugs crawling on/in her skin. She is picking at her skin and placing it in her hand showing the family saying it's bug. Picking moles off her back and thinking that it is a nest. Gave her a 25mg benadryl twice daily for 3 days now and it did not help. She stated she is itchy and scratches at her self. Getting hives from thinking about it. Daquan Covington does not see anything on her skin or in her hair. On yesterday they changed her clothes 6 times and have been vacuuming about 4 times a day. On yesterday she wanted Ms. Heath Hines the police and tell them to take her to [facility] because she thought that would help. Also to call Tyrell Fatima and get them to come out to the house.

## 2021-06-15 PROBLEM — F22 DELUSION OF INFESTATION (HCC): Status: ACTIVE | Noted: 2021-01-01

## 2021-06-15 NOTE — PROGRESS NOTES
CC:  
Chief Complaint Patient presents with  Altered mental status HISTORY OF PRESENT ILLNESS Mabel Hurst is a 80 y.o. female. Accompanied by her granddaughter, Martha Ross, and granddaughter's . Patient has been complaining of having bugs and lice on her for the past 2 weeks. Scratching and picking on her skin and moles. Family sees no bugs or lice. Patient brought in a jar of bugs she collected from her skin. The jar has a clean paper towel in with no bugs. Telephone message from 6/11/21 from nurse Rafita mccoy MA: Ghassan Hays (on HIPPA) and verified the patient by name and date of birth. She informed me that she thinks it's all in the patients head. The patient seems to think that she has bugs, lice or something. They have cleaned the house from head to toe, washed the patient from head to toe (including hair). No one else is complaining about it. Thinks there are bugs crawling on/in her skin. She is picking at her skin and placing it in her hand showing the family saying it's bug. Picking moles off her back and thinking that it is a nest. Gave her a 25 mg benadryl twice daily for 3 days now and it did not help. She stated she is itchy and scratches at her self. Getting hives from thinking about it. Trenton Maciel does not see anything on her skin or in her hair. On yesterday they changed her clothes 6 times and have been vacuuming about 4 times a day. \" 
 
Also complains of pains at knees/hips and lower leg swelling. Granddaughter gives her Tylenol Arthritis 650 mg BID with 1 extra tab in afternoon as needed. Patient Active Problem List  
Diagnosis Code  Postherpetic neuralgia B02.29  
 Cerebrovascular disease, arteriosclerotic, post-stroke I67.2, Z86.73  
 Asymptomatic PVD (peripheral vascular disease) ([facility]) I73.9  Hypercholesterolemia E78.00  Palpitations R00.2  Benign hypertension with chronic kidney disease, stage IV ([facility]) I12.9, N18.4  Hypothyroidism due to acquired atrophy of thyroid E03.4  Closed fracture of proximal phalanx of left great toe, initial encounter P17.593K  Closed right hip fracture ([facility] [address] S72.001A Paola Campos Orthopedic aftercare for healing traumatic fracture of right upper leg S72. 91XD  Cough R05  
 UTI (urinary tract infection) N39.0  Elevated troponin R77.8  Chest pain R07.9 Past Medical History:  
Diagnosis Date  Chronic kidney disease  CKD (chronic kidney disease) stage 3, GFR 30-59 ml/min ([facility]) 5/17/2010  Hypercholesteremia  Hypertension  Post herpetic neuralgia  Stroke ([facility] [address]  Thyroid disease Allergies Allergen Reactions  Lyrica [Pregabalin] Other (comments) Couldn't function  Macrobid [Nitrofurantoin Monohyd/M-Cryst] Other (comments)  
  sick  Neurontin [Gabapentin] Other (comments) groggy Current Outpatient Medications Medication Sig Dispense Refill  metoprolol tartrate (LOPRESSOR) 25 mg tablet Take 0.5 Tabs by mouth every twelve (12) hours. 90 Tab 1  
 magnesium hydroxide (Funk Milk of Magnesia) 400 mg/5 mL suspension Take 30 mL by mouth daily as needed for Constipation.  nortriptyline (PAMELOR) 10 mg capsule TAKE 1 TO 2 CAPSULES BY MOUTH AT BEDTIME FOR  SHINGLES  PAIN 180 Cap 0  
 simvastatin (ZOCOR) 20 mg tablet TAKE ONE TABLET BY MOUTH NIGHTLY 90 Tab 3  
 levothyroxine (SYNTHROID) 50 mcg tablet TAKE 2 TABLETS BY MOUTH ON SATURDAY AND SUNDAY AND TAKE 1 TABLET THE OTHER 5 DAYS. PLEASE GIVE 30 MINUTES BEFORE BREAKFAST. 117 Tab 3  
 aspirin delayed-release 81 mg tablet Take 1 Tab by mouth daily. 90 Tab 3  
 acetaminophen (Tylenol Extra Strength) 500 mg tablet Take 1 Tab by mouth two (2) times a day. 100 Tab 5  polyethylene glycol (Miralax) 17 gram/dose powder 1 tablespoon with 8 oz of water daily as needed for constipation not relieved with Colace  Indications: constipation 510 g 0  
 vit C,D-Du-hdeav-lutein-zeaxan (PreserVision AREDS-2) [id number] mg-unit-mg-mg cap capsule Take 1 Cap by mouth two (2) times daily (with meals). 180 Cap 3  
 calcium carbonate (Tums) 200 mg calcium (500 mg) chew Take 1 Tab by mouth two (2) times daily as needed (heartburn or dyspepsia). Peppermint flavored 180 Tab 3 PHYSICAL EXAM 
Visit Vitals BP (!) 164/69 (BP 1 Location: Left upper arm, BP Patient Position: Sitting, BP Cuff Size: Large adult) Pulse 77 Temp 98.5 °F (36.9 °C) (Oral) Resp 16 Ht 5' 7\" (1.702 m) Wt 122 lb (55.3 kg) SpO2 95% BMI 19.11 kg/m² General: Well-developed and well-nourished, no distress. Very hard of hearing. In wheelchair. HEENT:  Head normocephalic/atraumatic, no scleral icterus Lungs:  Clear to ausculation bilaterally. Good air movement. Heart:  Regular rate and rhythm, normal S1 and S2, no murmur, gallop, or rub Extremities: No clubbing, cyanosis. 1+ pitting edema at bilateral LE's. Skin: No rash or apparent insect bites. Neurological: Alert and oriented to person and place. Psychiatric: Normal mood and affect. Behavior is normal.  
 
 
 
ASSESSMENT AND PLAN 
  ICD-10-CM ICD-9-CM 1. 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 Hypothyroidism due to acquired atrophy of thyroid  E03.4 244.8 levothyroxine (SYNTHROID) 50 mcg tablet 246.8 3. Benign hypertension with chronic kidney disease, stage IV (HCC)  I12.9 403.10 N18.4 585.4 4. Asymptomatic PVD (peripheral vascular disease) ([facility])  I73.9 443.9 5. Postherpetic neuralgia  B02.29 053.19 nortriptyline (PAMELOR) 10 mg capsule 6. Hypercholesterolemia  E78.00 272.0 simvastatin (ZOCOR) 20 mg tablet Diagnoses and all orders for this visit: 1. Delusion of infestation ([facility] -     Start QUEtiapine (SEROquel) 25 mg tablet; Take 1 Tablet by mouth nightly. For bug problem. 2. Hypothyroidism due to acquired atrophy of thyroid -     Refill levothyroxine (SYNTHROID) 50 mcg tablet; TAKE 2 TABLETS BY MOUTH ON SATURDAY AND SUNDAY AND TAKE 1 TABLET THE OTHER 5 DAYS. PLEASE GIVE 30 MINUTES BEFORE BREAKFAST. 3. Benign hypertension with chronic kidney disease, stage IV ([facility] BP elevated at 171/79. Better controlled at home. 4. Asymptomatic PVD (peripheral vascular disease) ([facility] Stable. Continue ASA 81 mg daily. 5. Postherpetic neuralgia Decrease nortriptyline to 1 tab since she is starting Seroquel that will also make her sleepy. -     nortriptyline (PAMELOR) 10 mg capsule; TAKE 1 TO 2 CAPSULES BY MOUTH AT BEDTIME FOR  SHINGLES  PAIN 
 
6. Hypercholesterolemia -     Refill simvastatin (ZOCOR) 20 mg tablet; TAKE ONE TABLET BY MOUTH NIGHTLY Follow-up and Dispositions · Return in about 4 months (around 10/15/2021), or if symptoms worsen or fail to improve, for HTN, delusion, thyroid, chol. I have discussed the diagnosis with the patient and the intended plan as seen in the above orders. Patient is in agreement. The patient has received an after-visit summary and questions were answered concerning future plans. I have discussed medication side effects and warnings with the patient as well.

## 2021-06-15 NOTE — PROGRESS NOTES
Identified pt with two pt identifiers. Reviewed record in preparation for visit and have obtained necessary documentation. All patient medications has been reviewed. Chief Complaint Patient presents with  Altered mental status Additional information about chief complaint: 
 
Visit Vitals BP (!) 164/69 (BP 1 Location: Left upper arm, BP Patient Position: Sitting, BP Cuff Size: Large adult) Pulse 77 Temp 98.5 °F (36.9 °C) (Oral) Resp 16 Ht 5' 7\" (1.702 m) Wt 122 lb (55.3 kg) SpO2 95% BMI 19.11 kg/m² Health Maintenance Due Topic  COVID-19 Vaccine (1)  Shingrix Vaccine Age 50> (1 of 2)  Lipid Screen 1. Have you been to the ER, urgent care clinic since your last visit? Hospitalized since your last visit? ED for cough 9/19/20 May 19 UTI 2. Have you seen or consulted any other health care providers outside of the [address] [place] [address] since your last visit? Include any pap smears or colon screening.  
No  
bdg

## 2021-06-21 NOTE — TELEPHONE ENCOUNTER
Tell her that it can take up to 4 weeks for Seroquel to work so she should give it more time. In the meanwhile, she should:  1) Avoid arguing with her grandmother about her delusions. 2) Connect with the emotion of the delusion, e.g., say. It must be frightening to believe that you have bugs all over you  3) Calm things down--reduce noise and have fewer people around the person. 4) Show compassion for how she feels about their false belief.    (Guidelines from [email or web address])

## 2021-06-21 NOTE — TELEPHONE ENCOUNTER
Received phone call from [facility] and listed in Advanced Directives form. Verified patients name and date of birth. Patient still voicing concern about having bugs on/around her. Patient requesting linen changes multiple times. Family has washed her using Nix(per patient request) twice. Myles Damont to not use Nix on patient. Patient is sleeping a bit better since starting quetiapine but no reduction in \"seeing bugs\". Gopal Jian does know what to do and is seeing advice. Phone [phone].

## 2021-10-11 NOTE — TELEPHONE ENCOUNTER
Returned phone call to Juanpablo on behalf of pt, HIPAA verified by two patient identifiers. [place] states that the pt is not eating, having some hallucinations, and weakness. Overall health is declining. Juanpablo states that she would like for someone to come out to evaluate the pt. Would like to increase Quetiapine as she states that she is still itching from the \"bugs'. Advised that I will chula message to PCP and call back.

## 2021-10-11 NOTE — TELEPHONE ENCOUNTER
Returned phone call to Arsalan Calderón verified by two patient identifiers. Advised that per PCP, should be seen at ED for evaluation. Shahzad Senior states that the pt does not want to go to the ED and that someone came out to the house before from [address] and would like for them to come back. Advised that I will get message to PCP for other suggestions since she did not want to go the ED and call back.

## 2021-10-11 NOTE — TELEPHONE ENCOUNTER
Javid Talbot called and stated that she would like to speak to a nurse about having someone come out to the pt house. She believes the end is near and wants someone to come check the pt. She stated that the pt is not getting out of the bed very much, she is barely eating.  Javier Gaucher stated that the nurse or doctor needed to call her back urgently

## 2021-10-11 NOTE — TELEPHONE ENCOUNTER
They called [facility]. They do not service that area where the patient lives.

## 2021-10-11 NOTE — TELEPHONE ENCOUNTER
I recommend she call [facility] to see patient and make sure there is no infection or electrolyte abnormality (like low sodium) causing her weakness and hallucinations. Dr. Deb Philippe will make no change in her Seroquel until she is evaluated.

## 2021-10-11 NOTE — TELEPHONE ENCOUNTER
Patient daughter called back. I gave her the telephone number to Alvarado Ulloa and ask her to call us back if there are any issues.

## 2021-10-14 NOTE — TELEPHONE ENCOUNTER
Inform daughter that Dr. Nate Culver has placed a home health referral order so a nurse can come to their home to evaluate patient.

## 2021-10-15 NOTE — TELEPHONE ENCOUNTER
Becka Patel calling from [facility] [phone].  She state that she received the referral but she also needs demographics and office notes

## 2021-10-18 NOTE — TELEPHONE ENCOUNTER
Patient's granddaughter, Raghu Dunlap, told me today that she has not heard from anyone from [facility] [place]. Please call Valeria Villasenor from [facility] [phone] to see if she has contacted [facility] Fabi.

## 2021-10-19 NOTE — TELEPHONE ENCOUNTER
Cesar Vera stated that she still has not heard from the home health people. She stated that she would rather have [facility] come out for the pt.

## 2021-10-19 NOTE — TELEPHONE ENCOUNTER
I called [facility] and verified them by name and date of birth. The patient is scheduled for start care tomorrow. She will give Chhaya Lynn a call and let her know.

## 2021-10-19 NOTE — PROGRESS NOTES
Chief Complaint   Patient presents with    Hypertension     4 month f/u    Thyroid Problem    Cholesterol Problem    Delusional       VV email [email or web address]. [email or web address] Griffith would like to try the Ipad so it is bigger and can see better. Oralee Call, granddaughter is with pt today. Granddaughter states pts pulse has been running high (between 122-132) for a few days at a time and then goes back down. 1. Have you been to the ER, urgent care clinic since your last visit? Hospitalized since your last visit? No    )2. Have you seen or consulted any other health care providers outside of the [address] [place] [address] since your last visit? Include any pap smears or colon screening.  No

## 2021-10-19 NOTE — PROGRESS NOTES
Eliana Pearl is a 80 y.o. female, evaluated via audio-only technology on 10/19/2021 for Hypertension (4 month f/u), Thyroid Problem, Cholesterol Problem, and Delusional  .    Assessment & Plan:     Diagnoses and all orders for this visit:    1. Tachycardia  Increase metoprolol 25 mg from 1/2 tab BID to 1/2 tab in am and 1 tab in pm. Monitor HR and BP at home. Amish David will call me in 1 week to let me know how her HR and BP are doing; if okay, I will plan on sending a metoprolol refill at the new dose. 2. Confusion  Rule out UTI. Patient is too weak to leave the home. I ordered home skilled nursing. [facility] from [facility] is scheduled to see her tomorrow. 3. Delusion of infestation ([address]  Will continue Seroquel 25 mg nightly since it appears to be helping. Follow-up and Dispositions    · Return in about 2 weeks (around 11/2/2021), or if symptoms worsen or fail to improve, for Fast pulse, delusions. Routing History          12  Subjective:     Unable to make video connection. Spoke with patient's granddaughter, Ellie Sandoval. Patient has elevated HR in 120-130's with no symptoms. She used to take metoprolol 25 mg 1 tab BID but made her too sleepy. Now on 1/2 tab BID. Has been more confused lately. Recently started having hallucinations; sees and hears people who are not there. Does not sleep well at night. Is incontinent of urine, but urine in pull up does not look dark or have changed odor. Seroquel seems to be helping a little with her delusion of insect infestation. Prior to Admission medications    Medication Sig Start Date End Date Taking? Authorizing Provider   multivitamin, tx-iron-ca-min (THERA-M w/ IRON) 9 mg iron-400 mcg tab tablet Take 0.5 Tablets by mouth daily.    Yes Provider, Historical   QUEtiapine (SEROquel) 25 mg tablet TAKE 1 TABLET BY MOUTH NIGHTLY FOR  BUG  PROBLEM 8/31/21  Yes Addie Andre PA-C   metoprolol tartrate (LOPRESSOR) 25 mg tablet TAKE 1/2 (ONE-HALF) TABLET BY MOUTH EVERY 12 HOURS 6/15/21  Yes Ben Alejandre MD   nortriptyline (PAMELOR) 10 mg capsule TAKE 1 TO 2 CAPSULES BY MOUTH AT BEDTIME FOR  SHINGLES  PAIN 6/15/21  Yes Ben Alejandre MD   simvastatin (ZOCOR) 20 mg tablet TAKE ONE TABLET BY MOUTH NIGHTLY 6/15/21  Yes Ben Alejandre MD   levothyroxine (SYNTHROID) 50 mcg tablet TAKE 2 TABLETS BY MOUTH ON SATURDAY AND SUNDAY AND TAKE 1 TABLET THE OTHER 5 DAYS. PLEASE GIVE 30 MINUTES BEFORE BREAKFAST. 6/15/21  Yes Ben Alejandre MD   magnesium hydroxide (Funk Milk of Magnesia) 400 mg/5 mL suspension Take 30 mL by mouth daily as needed for Constipation. Yes Provider, Historical   aspirin delayed-release 81 mg tablet Take 1 Tab by mouth daily. 7/15/20  Yes Noreen Montaño MD   acetaminophen (Tylenol Extra Strength) 500 mg tablet Take 1 Tab by mouth two (2) times a day. 7/15/20  Yes Noreen Montaño MD   polyethylene glycol (Miralax) 17 gram/dose powder 1 tablespoon with 8 oz of water daily as needed for constipation not relieved with Colace  Indications: constipation 7/15/20  Yes Noreen Montaño MD   calcium carbonate (Tums) 200 mg calcium (500 mg) chew Take 1 Tab by mouth two (2) times daily as needed (heartburn or dyspepsia). Peppermint flavored 7/15/20  Yes Noreen Montaño MD     Patient Active Problem List   Diagnosis Code    Postherpetic neuralgia B02.29    Cerebrovascular disease, arteriosclerotic, post-stroke I67.2, Z86.73    Asymptomatic [facility]ipheral [facility]) [facility] I73.9    Hypercholesterolemia E78.00    Palpitations R00.2    Benign hypertension with chronic kidney disease, stage IV ([facility]CC) I12.9, N18.4    Hypothyroidism due to acquired atrophy of thyroid E03.4    Closed fracture of proximal phalanx of left great toe, initial encounter S92.412A    Closed right hip fracture ([facility] S72.001A    Orthopedic aftercare for healing traumatic fracture of right upper leg S72. 91XD    Cough R05.9    UTI (urinary tract infection) N39.0    Elevated troponin R77.8    Chest pain R07.9    Delusion of infestation ([facility]CC) F22       Patient-Reported Vitals 10/19/2021   Patient-Reported Pulse 132   Patient-Reported Temperature 97.1   Patient-Reported Systolic  048   Patient-Reported Diastolic 88       Janel Guerra, who was evaluated through a patient-initiated, synchronous (real-time) audio only encounter, and/or her healthcare decision maker, is aware that it is a billable service, with coverage as determined by her insurance carrier. She provided verbal consent to proceed: Yes. She has not had a related appointment within my department in the past 7 days or scheduled within the next 24 hours. On this date 10/19/2021 I have spent 25 minutes reviewing previous notes, test results and face to face (virtual) with the patient discussing the diagnosis and importance of compliance with the treatment plan as well as documenting on the day of the visit.     Yessenia Jones MD

## 2021-10-20 NOTE — TELEPHONE ENCOUNTER
Violette Riddle with [facility] ([phone]) called and stated that she has spoken to Marie Bearden and they are still waiting on the office visit notes to be able to go out and see the pt. They ended up having to cancel the appt for today due to not receiving that note.  They asked for it to be faxed to [phone] and she will call Marie Bearden as soon as she get that's

## 2021-10-21 NOTE — TELEPHONE ENCOUNTER
Jose calling from [facility] [phone].  States that its very important that you call her

## 2021-10-21 NOTE — TELEPHONE ENCOUNTER
Pt needs to have a face to face, either in person or virtal not a phone call. Appointment rescheduled for 10/26/2021.

## 2021-10-26 NOTE — PROGRESS NOTES
Antoinette Schilling is a 80 y.o. female who was seen by synchronous (real-time) audio-video technology on 10/26/2021 for Mental Health Problem and Irregular Heart Beat        Assessment & Plan:   Diagnoses and all orders for this visit:    1. Palpitations  -     Refill metoprolol tartrate (LOPRESSOR) 25 mg tablet; Take 1 Tablet by mouth two (2) times a day. 2. Benign hypertension with chronic kidney disease, stage IV (HCC)  Controlled. Due to advanced age and declining health, patient should also be evaluated for possible Hospice Care. -     [address] instructed to notify MD for:  1)  or more for 2 consecutive days or more   2) HR > 130 for 2 consecutive days or more  3) Pulse ox 88% or less for 2 days or more    3. Pressure injury of skin of sacral region, unspecified injury stage  -     REFERRAL TO HOME HEALTH    4. Hallucinations  Most likely due to vascular dementia.   -     REFERRAL TO HOME HEALTH    5. Delusion of infestation ([address]  Improved with Seroquel. Follow-up and Dispositions    · Return in about 3 months (around 1/26/2022), or if symptoms worsen or fail to improve, for HTN, confusion. 712  Subjective:     History provided by patient's granddaughter, Marciano Baires. At last clinic visit, metoprolol 25 mg dose increased from 1/2 tab BID to 1/2 tab in am and 1 tab in pm due to HR in 120-130's range. Over past week, HR . Today was 110 on one device and 126 on another device, with the former more likely accurate. /83. Grand-daughter wants parameters on vitals for when to notify MD. Would like home health nurse to evaluate her. I placed referral for [facility] home health but grand-daughter was not able to make arrangements with them. She prefers [facility]. Patient is weak and confused more frequently than before. Has occasional visual hallucinations. Likes to lay down frequently; now has an early bed sore.     Usually eats well. Sometimes takes muffin to bed to eat in the middle of the night. Does not like to drink water. Drinks Ensure (likes Ensure Clear best), juice, Ginger Ale, coffee or tea. Had stomach cramps with constipation recently; relieved after drinking 4 oz of Mg citrate. Patient's sleep pattern variable. Sometimes sleeps all night, other times up at night and sleeps in the day. Has 4 home health aides who provide 24-hr care 7 days a week. William Ill with her presently. Top >180 if 2 days. If P >130 for 2 days. Less than 88% for 2 days. Prior to Admission medications    Medication Sig Start Date End Date Taking? Authorizing Provider   multivitamin, tx-iron-ca-min (THERA-M w/ IRON) 9 mg iron-400 mcg tab tablet Take 0.5 Tablets by mouth daily. Yes Provider, Historical   QUEtiapine (SEROquel) 25 mg tablet TAKE 1 TABLET BY MOUTH NIGHTLY FOR  BUG  PROBLEM 8/31/21  Yes Ariana Iglesias PA-C   metoprolol tartrate (LOPRESSOR) 25 mg tablet TAKE 1/2 (ONE-HALF) TABLET BY MOUTH EVERY 12 HOURS 6/15/21  Yes Matthew Chan MD   nortriptyline (PAMELOR) 10 mg capsule TAKE 1 TO 2 CAPSULES BY MOUTH AT BEDTIME FOR  SHINGLES  PAIN 6/15/21  Yes Matthew Chan MD   simvastatin (ZOCOR) 20 mg tablet TAKE ONE TABLET BY MOUTH NIGHTLY 6/15/21  Yes Matthew Chan MD   levothyroxine (SYNTHROID) 50 mcg tablet TAKE 2 TABLETS BY MOUTH ON SATURDAY AND SUNDAY AND TAKE 1 TABLET THE OTHER 5 DAYS. PLEASE GIVE 30 MINUTES BEFORE BREAKFAST. 6/15/21  Yes Matthew Chan MD   magnesium hydroxide (Funk Milk of Magnesia) 400 mg/5 mL suspension Take 30 mL by mouth daily as needed for Constipation. Yes Provider, Historical   aspirin delayed-release 81 mg tablet Take 1 Tab by mouth daily. 7/15/20  Yes Vannesa Lima MD   acetaminophen (Tylenol Extra Strength) 500 mg tablet Take 1 Tab by mouth two (2) times a day.  7/15/20  Yes Vannesa Lima MD   polyethylene glycol (Miralax) 17 gram/dose powder 1 tablespoon with 8 oz of water daily as needed for constipation not relieved with Colace  Indications: constipation 7/15/20  Yes Mignon Owens MD   calcium carbonate (Tums) 200 mg calcium (500 mg) chew Take 1 Tab by mouth two (2) times daily as needed (heartburn or dyspepsia). 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 91XD    Cough R05.9    UTI (urinary tract infection) N39.0    Elevated troponin R77.8    Chest pain R07.9    Delusion of infestation ([facility]) F22       Objective:     Patient-Reported Vitals 10/26/2021   Patient-Reported Pulse 126   Patient-Reported Temperature -   Patient-Reported SpO2 -   Patient-Reported Systolic  -   Patient-Reported Diastolic -      General: alert, cooperative, no distress   Mental  status: normal mood, behavior, speech, dress, motor activity, and thought processes, able to follow commands   HENT: NCAT   Neck: no visualized mass   Resp: no respiratory distress   Neuro: no gross deficits   Skin: no discoloration or lesions of concern on visible areas   Psychiatric: normal affect, consistent with stated mood, no evidence of hallucinations     Additional exam findings: Patient awake alert but confused. Lying down with head raised. We discussed the expected course, resolution and complications of the diagnosis(es) in detail.   Medication risks, benefits, costs, interactions, and alternatives were discussed as indicated. I advised her to contact the office if her condition worsens, changes or fails to improve as anticipated. She expressed understanding with the diagnosis(es) and plan. THIS VISIT WAS COMPLETED VIRTUALLY USING DOXY[employer]otty Yadav, was evaluated through a synchronous (real-time) audio-video encounter. The patient (or guardian if applicable) is aware that this is a billable service. Verbal consent to proceed has been obtained within the past 12 months. The visit was conducted pursuant to the emergency declaration under the [address] [place] [address] authority and the [employer] General Act. Patient identification was verified, and a caregiver was present when appropriate. The patient was located in a state where the provider was credentialed to provide care.     Zackary Babcock MD

## 2021-10-27 NOTE — TELEPHONE ENCOUNTER
----- Message from Angelia Tarah sent at 10/27/2021  3:55 PM EDT -----  Subject: Message to Provider    QUESTIONS  Information for Provider? Damien Babin from [facility] called and   needs the notes from [facility] visit on 10/26/2021. Please call her back with   question , the fax number is [phone].   ---------------------------------------------------------------------------  --------------  CALL BACK INFO  What is the best way for the office to contact you? OK to leave message on   voicemail  Preferred Call Back Phone Number? [phone]  ---------------------------------------------------------------------------  --------------  SCRIPT ANSWERS  Relationship to Patient? Third Party  Representative Name?  Damien Babin

## 2021-11-01 NOTE — TELEPHONE ENCOUNTER
Lucia Lambert fax [phone] copy of advanced directive and inpatient DNR, advising that we do not have any other DNR. Confirmation received.

## 2021-11-01 NOTE — TELEPHONE ENCOUNTER
Rocio with [facility] called and stated that the pt daughter told her that we had a DNR on file for the pt and since they did not want to do another one she asked if we could fax the one we have.      Fax number [phone] attarianna CHAPIN

## 2021-11-14 NOTE — TELEPHONE ENCOUNTER
Left message for Mariella per Dr Kate Ventura. Spoke to [employer] (on HIPPA) and advised her of antibiotic. Patient stated understanding. Patient/Caregiver provided printed discharge information.

## 2022-01-25 ENCOUNTER — TELEPHONE (OUTPATIENT)
Dept: INTERNAL MEDICINE CLINIC | Age: 87
End: 2022-01-25
